# Patient Record
Sex: MALE | Race: WHITE | NOT HISPANIC OR LATINO | Employment: FULL TIME | URBAN - NONMETROPOLITAN AREA
[De-identification: names, ages, dates, MRNs, and addresses within clinical notes are randomized per-mention and may not be internally consistent; named-entity substitution may affect disease eponyms.]

---

## 2017-01-11 ENCOUNTER — HISTORY (OUTPATIENT)
Dept: FAMILY MEDICINE | Facility: OTHER | Age: 42
End: 2017-01-11

## 2017-01-11 ENCOUNTER — OFFICE VISIT - GICH (OUTPATIENT)
Dept: FAMILY MEDICINE | Facility: OTHER | Age: 42
End: 2017-01-11

## 2017-01-11 DIAGNOSIS — F43.22 ADJUSTMENT DISORDER WITH ANXIETY: ICD-10-CM

## 2017-01-11 ASSESSMENT — ANXIETY QUESTIONNAIRES
6. BECOMING EASILY ANNOYED OR IRRITABLE: SEVERAL DAYS
1. FEELING NERVOUS, ANXIOUS, OR ON EDGE: SEVERAL DAYS
3. WORRYING TOO MUCH ABOUT DIFFERENT THINGS: SEVERAL DAYS
2. NOT BEING ABLE TO STOP OR CONTROL WORRYING: SEVERAL DAYS
4. TROUBLE RELAXING: NOT AT ALL
GAD7 TOTAL SCORE: 5
5. BEING SO RESTLESS THAT IT IS HARD TO SIT STILL: SEVERAL DAYS
7. FEELING AFRAID AS IF SOMETHING AWFUL MIGHT HAPPEN: NOT AT ALL

## 2017-01-11 ASSESSMENT — PATIENT HEALTH QUESTIONNAIRE - PHQ9: SUM OF ALL RESPONSES TO PHQ QUESTIONS 1-9: 2

## 2017-01-13 ENCOUNTER — COMMUNICATION - GICH (OUTPATIENT)
Dept: FAMILY MEDICINE | Facility: OTHER | Age: 42
End: 2017-01-13

## 2017-01-13 DIAGNOSIS — F43.22 ADJUSTMENT DISORDER WITH ANXIETY: ICD-10-CM

## 2017-02-17 ENCOUNTER — OFFICE VISIT - GICH (OUTPATIENT)
Dept: FAMILY MEDICINE | Facility: OTHER | Age: 42
End: 2017-02-17

## 2017-02-17 ENCOUNTER — HISTORY (OUTPATIENT)
Dept: FAMILY MEDICINE | Facility: OTHER | Age: 42
End: 2017-02-17

## 2017-02-17 DIAGNOSIS — F43.22 ADJUSTMENT DISORDER WITH ANXIETY: ICD-10-CM

## 2017-02-17 ASSESSMENT — PATIENT HEALTH QUESTIONNAIRE - PHQ9: SUM OF ALL RESPONSES TO PHQ QUESTIONS 1-9: 4

## 2017-04-27 ENCOUNTER — HISTORY (OUTPATIENT)
Dept: FAMILY MEDICINE | Facility: OTHER | Age: 42
End: 2017-04-27

## 2017-04-27 ENCOUNTER — OFFICE VISIT - GICH (OUTPATIENT)
Dept: FAMILY MEDICINE | Facility: OTHER | Age: 42
End: 2017-04-27

## 2017-04-27 DIAGNOSIS — R29.898 OTHER SYMPTOMS AND SIGNS INVOLVING THE MUSCULOSKELETAL SYSTEM: ICD-10-CM

## 2017-04-27 DIAGNOSIS — Z30.8 ENCOUNTER FOR OTHER CONTRACEPTIVE MANAGEMENT: ICD-10-CM

## 2017-04-28 ENCOUNTER — AMBULATORY - GICH (OUTPATIENT)
Dept: RADIOLOGY | Facility: OTHER | Age: 42
End: 2017-04-28

## 2017-04-28 DIAGNOSIS — T14.8XXA OTHER INJURY OF UNSPECIFIED BODY REGION: ICD-10-CM

## 2017-05-01 ENCOUNTER — HOSPITAL ENCOUNTER (OUTPATIENT)
Dept: RADIOLOGY | Facility: OTHER | Age: 42
End: 2017-05-01

## 2017-05-01 DIAGNOSIS — T14.8XXA OTHER INJURY OF UNSPECIFIED BODY REGION: ICD-10-CM

## 2017-05-16 ENCOUNTER — AMBULATORY - GICH (OUTPATIENT)
Dept: PHYSICAL THERAPY | Facility: OTHER | Age: 42
End: 2017-05-16

## 2017-05-16 DIAGNOSIS — Z98.890 OTHER SPECIFIED POSTPROCEDURAL STATES: ICD-10-CM

## 2017-06-06 ENCOUNTER — HOSPITAL ENCOUNTER (OUTPATIENT)
Dept: PHYSICAL THERAPY | Facility: OTHER | Age: 42
Setting detail: THERAPIES SERIES
End: 2017-06-06

## 2017-06-06 DIAGNOSIS — Z98.890 OTHER SPECIFIED POSTPROCEDURAL STATES: ICD-10-CM

## 2017-06-12 ENCOUNTER — HOSPITAL ENCOUNTER (OUTPATIENT)
Dept: PHYSICAL THERAPY | Facility: OTHER | Age: 42
Setting detail: THERAPIES SERIES
End: 2017-06-12

## 2017-06-19 ENCOUNTER — HOSPITAL ENCOUNTER (OUTPATIENT)
Dept: PHYSICAL THERAPY | Facility: OTHER | Age: 42
Setting detail: THERAPIES SERIES
End: 2017-06-19

## 2017-06-27 ENCOUNTER — HOSPITAL ENCOUNTER (OUTPATIENT)
Dept: PHYSICAL THERAPY | Facility: OTHER | Age: 42
Setting detail: THERAPIES SERIES
End: 2017-06-27

## 2017-06-29 ENCOUNTER — HOSPITAL ENCOUNTER (OUTPATIENT)
Dept: PHYSICAL THERAPY | Facility: OTHER | Age: 42
Setting detail: THERAPIES SERIES
End: 2017-06-29

## 2017-07-03 ENCOUNTER — HOSPITAL ENCOUNTER (OUTPATIENT)
Dept: PHYSICAL THERAPY | Facility: OTHER | Age: 42
Setting detail: THERAPIES SERIES
End: 2017-07-03

## 2017-07-05 ENCOUNTER — HOSPITAL ENCOUNTER (OUTPATIENT)
Dept: PHYSICAL THERAPY | Facility: OTHER | Age: 42
Setting detail: THERAPIES SERIES
End: 2017-07-05

## 2017-07-13 ENCOUNTER — HOSPITAL ENCOUNTER (OUTPATIENT)
Dept: PHYSICAL THERAPY | Facility: OTHER | Age: 42
Setting detail: THERAPIES SERIES
End: 2017-07-13

## 2017-07-25 ENCOUNTER — HOSPITAL ENCOUNTER (OUTPATIENT)
Dept: PHYSICAL THERAPY | Facility: OTHER | Age: 42
Setting detail: THERAPIES SERIES
End: 2017-07-25

## 2017-08-02 ENCOUNTER — HOSPITAL ENCOUNTER (OUTPATIENT)
Dept: PHYSICAL THERAPY | Facility: OTHER | Age: 42
Setting detail: THERAPIES SERIES
End: 2017-08-02

## 2017-08-18 ENCOUNTER — OFFICE VISIT - GICH (OUTPATIENT)
Dept: FAMILY MEDICINE | Facility: OTHER | Age: 42
End: 2017-08-18

## 2017-08-18 ENCOUNTER — HOSPITAL ENCOUNTER (OUTPATIENT)
Dept: RADIOLOGY | Facility: OTHER | Age: 42
End: 2017-08-18
Attending: FAMILY MEDICINE

## 2017-08-18 ENCOUNTER — HISTORY (OUTPATIENT)
Dept: FAMILY MEDICINE | Facility: OTHER | Age: 42
End: 2017-08-18

## 2017-08-18 DIAGNOSIS — S63.619A: ICD-10-CM

## 2017-09-25 ENCOUNTER — COMMUNICATION - GICH (OUTPATIENT)
Dept: FAMILY MEDICINE | Facility: OTHER | Age: 42
End: 2017-09-25

## 2017-09-25 DIAGNOSIS — K21.9 GASTRO-ESOPHAGEAL REFLUX DISEASE WITHOUT ESOPHAGITIS: ICD-10-CM

## 2017-10-23 ENCOUNTER — HISTORY (OUTPATIENT)
Dept: FAMILY MEDICINE | Facility: OTHER | Age: 42
End: 2017-10-23

## 2017-10-23 ENCOUNTER — OFFICE VISIT - GICH (OUTPATIENT)
Dept: FAMILY MEDICINE | Facility: OTHER | Age: 42
End: 2017-10-23

## 2017-10-23 DIAGNOSIS — K21.9 GASTRO-ESOPHAGEAL REFLUX DISEASE WITHOUT ESOPHAGITIS: ICD-10-CM

## 2017-10-23 DIAGNOSIS — R19.8 OTHER SPECIFIED SYMPTOMS AND SIGNS INVOLVING THE DIGESTIVE SYSTEM AND ABDOMEN: ICD-10-CM

## 2017-11-01 ENCOUNTER — OFFICE VISIT - GICH (OUTPATIENT)
Dept: FAMILY MEDICINE | Facility: OTHER | Age: 42
End: 2017-11-01

## 2017-11-01 ENCOUNTER — HISTORY (OUTPATIENT)
Dept: FAMILY MEDICINE | Facility: OTHER | Age: 42
End: 2017-11-01

## 2017-11-01 DIAGNOSIS — Z00.00 ENCOUNTER FOR GENERAL ADULT MEDICAL EXAMINATION WITHOUT ABNORMAL FINDINGS: ICD-10-CM

## 2017-11-01 DIAGNOSIS — R10.13 EPIGASTRIC PAIN: ICD-10-CM

## 2017-11-01 DIAGNOSIS — K21.9 GASTRO-ESOPHAGEAL REFLUX DISEASE WITHOUT ESOPHAGITIS: ICD-10-CM

## 2017-11-01 LAB
A/G RATIO - HISTORICAL: 1.6 (ref 1–2)
ABSOLUTE BASOPHILS - HISTORICAL: 0 THOU/CU MM
ABSOLUTE EOSINOPHILS - HISTORICAL: 0.1 THOU/CU MM
ABSOLUTE IMMATURE GRANULOCYTES(METAS,MYELOS,PROS) - HISTORICAL: 0 THOU/CU MM
ABSOLUTE LYMPHOCYTES - HISTORICAL: 1.1 THOU/CU MM (ref 0.9–2.9)
ABSOLUTE MONOCYTES - HISTORICAL: 0.4 THOU/CU MM
ABSOLUTE NEUTROPHILS - HISTORICAL: 3.1 THOU/CU MM (ref 1.7–7)
ALBUMIN SERPL-MCNC: 4.3 G/DL (ref 3.5–5.7)
ALP SERPL-CCNC: 47 IU/L (ref 34–104)
ALT (SGPT) - HISTORICAL: 19 IU/L (ref 7–52)
ANION GAP - HISTORICAL: 8 (ref 5–18)
AST SERPL-CCNC: 18 IU/L (ref 13–39)
BASOPHILS # BLD AUTO: 0.4 %
BILIRUB SERPL-MCNC: 0.7 MG/DL (ref 0.3–1)
BUN SERPL-MCNC: 17 MG/DL (ref 7–25)
BUN/CREAT RATIO - HISTORICAL: 17
CALCIUM SERPL-MCNC: 9.5 MG/DL (ref 8.6–10.3)
CHLORIDE SERPLBLD-SCNC: 105 MMOL/L (ref 98–107)
CO2 SERPL-SCNC: 27 MMOL/L (ref 21–31)
CREAT SERPL-MCNC: 1.03 MG/DL (ref 0.7–1.3)
EOSINOPHIL NFR BLD AUTO: 1.1 %
ERYTHROCYTE [DISTWIDTH] IN BLOOD BY AUTOMATED COUNT: 12.3 % (ref 11.5–15.5)
GFR IF NOT AFRICAN AMERICAN - HISTORICAL: >60 ML/MIN/1.73M2
GLOBULIN - HISTORICAL: 2.7 G/DL (ref 2–3.7)
GLUCOSE SERPL-MCNC: 101 MG/DL (ref 70–105)
HCT VFR BLD AUTO: 48 % (ref 37–53)
HEMOGLOBIN: 16.5 G/DL (ref 13.5–17.5)
HIV-1/HIV-2 ANTIBODY CATEGORY - HISTORICAL: NORMAL
IMMATURE GRANULOCYTES(METAS,MYELOS,PROS) - HISTORICAL: 0.2 %
LYMPHOCYTES NFR BLD AUTO: 23.6 % (ref 20–44)
MCH RBC QN AUTO: 31.9 PG (ref 26–34)
MCHC RBC AUTO-ENTMCNC: 34.4 G/DL (ref 32–36)
MCV RBC AUTO: 93 FL (ref 80–100)
MONOCYTES NFR BLD AUTO: 7.6 %
NEUTROPHILS NFR BLD AUTO: 67.1 % (ref 42–72)
PLATELET # BLD AUTO: 143 THOU/CU MM (ref 140–440)
PMV BLD: 11.5 FL (ref 6.5–11)
POTASSIUM SERPL-SCNC: 4.4 MMOL/L (ref 3.5–5.1)
PROT SERPL-MCNC: 7 G/DL (ref 6.4–8.9)
RED BLOOD COUNT - HISTORICAL: 5.18 MIL/CU MM (ref 4.3–5.9)
SODIUM SERPL-SCNC: 140 MMOL/L (ref 133–143)
WHITE BLOOD COUNT - HISTORICAL: 4.6 THOU/CU MM (ref 4.5–11)

## 2017-11-01 ASSESSMENT — ANXIETY QUESTIONNAIRES
6. BECOMING EASILY ANNOYED OR IRRITABLE: NOT AT ALL
7. FEELING AFRAID AS IF SOMETHING AWFUL MIGHT HAPPEN: NOT AT ALL
3. WORRYING TOO MUCH ABOUT DIFFERENT THINGS: SEVERAL DAYS
4. TROUBLE RELAXING: NOT AT ALL
5. BEING SO RESTLESS THAT IT IS HARD TO SIT STILL: NOT AT ALL
2. NOT BEING ABLE TO STOP OR CONTROL WORRYING: SEVERAL DAYS
1. FEELING NERVOUS, ANXIOUS, OR ON EDGE: SEVERAL DAYS
GAD7 TOTAL SCORE: 3

## 2017-11-02 ENCOUNTER — COMMUNICATION - GICH (OUTPATIENT)
Dept: SURGERY | Facility: OTHER | Age: 42
End: 2017-11-02

## 2017-11-03 ENCOUNTER — AMBULATORY - GICH (OUTPATIENT)
Dept: FAMILY MEDICINE | Facility: OTHER | Age: 42
End: 2017-11-03

## 2017-11-03 ENCOUNTER — AMBULATORY - GICH (OUTPATIENT)
Dept: LAB | Facility: OTHER | Age: 42
End: 2017-11-03

## 2017-11-03 ENCOUNTER — COMMUNICATION - GICH (OUTPATIENT)
Dept: FAMILY MEDICINE | Facility: OTHER | Age: 42
End: 2017-11-03

## 2017-11-03 DIAGNOSIS — R30.0 DYSURIA: ICD-10-CM

## 2017-11-03 LAB
BACTERIA URINE: NORMAL BACTERIA/HPF
BILIRUB UR QL: NEGATIVE
CLARITY, URINE: CLEAR CLARITY
COLOR UR: YELLOW COLOR
EPITHELIAL CELLS: NORMAL EPI/HPF
GLUCOSE URINE: NEGATIVE MG/DL
KETONES UR QL: NEGATIVE MG/DL
LEUKOCYTE ESTERASE URINE: ABNORMAL
NITRITE UR QL STRIP: NEGATIVE
OCCULT BLOOD,URINE - HISTORICAL: NEGATIVE
PH UR: 6 [PH]
PROTEIN QUALITATIVE,URINE - HISTORICAL: NEGATIVE MG/DL
RBC - HISTORICAL: NORMAL /HPF
SP GR UR STRIP: 1.01
UROBILINOGEN,QUALITATIVE - HISTORICAL: NORMAL EU/DL
WBC - HISTORICAL: NORMAL /HPF

## 2017-11-06 ENCOUNTER — COMMUNICATION - GICH (OUTPATIENT)
Dept: FAMILY MEDICINE | Facility: OTHER | Age: 42
End: 2017-11-06

## 2017-11-06 ENCOUNTER — OFFICE VISIT - GICH (OUTPATIENT)
Dept: FAMILY MEDICINE | Facility: OTHER | Age: 42
End: 2017-11-06

## 2017-11-06 ENCOUNTER — HISTORY (OUTPATIENT)
Dept: FAMILY MEDICINE | Facility: OTHER | Age: 42
End: 2017-11-06

## 2017-11-06 DIAGNOSIS — R36.9 URETHRAL DISCHARGE: ICD-10-CM

## 2017-11-06 DIAGNOSIS — Z11.3 ENCOUNTER FOR SCREENING FOR INFECTIONS WITH PREDOMINANTLY SEXUAL MODE OF TRANSMISSION: ICD-10-CM

## 2017-11-07 ENCOUNTER — HOSPITAL ENCOUNTER (OUTPATIENT)
Dept: RADIOLOGY | Facility: OTHER | Age: 42
End: 2017-11-07
Attending: FAMILY MEDICINE

## 2017-11-07 ENCOUNTER — OFFICE VISIT - GICH (OUTPATIENT)
Dept: FAMILY MEDICINE | Facility: OTHER | Age: 42
End: 2017-11-07

## 2017-11-07 ENCOUNTER — HISTORY (OUTPATIENT)
Dept: FAMILY MEDICINE | Facility: OTHER | Age: 42
End: 2017-11-07

## 2017-11-07 DIAGNOSIS — R10.9 ABDOMINAL PAIN: ICD-10-CM

## 2017-11-07 LAB
BILIRUB UR QL: NEGATIVE
CLARITY, URINE: CLEAR CLARITY
COLOR UR: YELLOW COLOR
GLUCOSE URINE: NEGATIVE MG/DL
KETONES UR QL: NEGATIVE MG/DL
LEUKOCYTE ESTERASE URINE: NEGATIVE
NITRITE UR QL STRIP: NEGATIVE
OCCULT BLOOD,URINE - HISTORICAL: NEGATIVE
PH UR: 6.5 [PH]
PROTEIN QUALITATIVE,URINE - HISTORICAL: NEGATIVE MG/DL
SP GR UR STRIP: <=1.005
UROBILINOGEN,QUALITATIVE - HISTORICAL: NORMAL EU/DL

## 2017-11-09 LAB — CULTURE - HISTORICAL: NORMAL

## 2017-12-05 ENCOUNTER — HISTORY (OUTPATIENT)
Dept: FAMILY MEDICINE | Facility: OTHER | Age: 42
End: 2017-12-05

## 2017-12-05 ENCOUNTER — OFFICE VISIT - GICH (OUTPATIENT)
Dept: FAMILY MEDICINE | Facility: OTHER | Age: 42
End: 2017-12-05

## 2017-12-05 DIAGNOSIS — M54.6 PAIN IN THORACIC SPINE: ICD-10-CM

## 2017-12-05 DIAGNOSIS — F41.9 ANXIETY DISORDER: ICD-10-CM

## 2017-12-05 DIAGNOSIS — N34.1 NONSPECIFIC URETHRITIS: ICD-10-CM

## 2017-12-05 DIAGNOSIS — N41.9 INFLAMMATORY DISEASE OF PROSTATE: ICD-10-CM

## 2017-12-05 LAB
BACTERIA URINE: NORMAL BACTERIA/HPF
BILIRUB UR QL: NEGATIVE
CLARITY, URINE: CLEAR CLARITY
COLOR UR: YELLOW COLOR
EPITHELIAL CELLS: NORMAL EPI/HPF
GLUCOSE URINE: NEGATIVE MG/DL
KETONES UR QL: NEGATIVE MG/DL
LEUKOCYTE ESTERASE URINE: ABNORMAL
NITRITE UR QL STRIP: NEGATIVE
OCCULT BLOOD,URINE - HISTORICAL: NEGATIVE
PH UR: 6.5 [PH]
PROTEIN QUALITATIVE,URINE - HISTORICAL: NEGATIVE MG/DL
RBC - HISTORICAL: NORMAL /HPF
SP GR UR STRIP: <=1.005
UROBILINOGEN,QUALITATIVE - HISTORICAL: NORMAL EU/DL
WBC - HISTORICAL: NORMAL /HPF

## 2017-12-06 ENCOUNTER — HISTORY (OUTPATIENT)
Dept: SURGERY | Facility: OTHER | Age: 42
End: 2017-12-06

## 2017-12-07 ENCOUNTER — SURGERY (OUTPATIENT)
Dept: SURGERY | Facility: OTHER | Age: 42
End: 2017-12-07

## 2017-12-07 ENCOUNTER — HOSPITAL ENCOUNTER (OUTPATIENT)
Dept: SURGERY | Facility: OTHER | Age: 42
Discharge: HOME | End: 2017-12-07
Attending: SURGERY | Admitting: SURGERY

## 2017-12-07 ENCOUNTER — HISTORY (OUTPATIENT)
Dept: SURGERY | Facility: OTHER | Age: 42
End: 2017-12-07

## 2017-12-08 ENCOUNTER — AMBULATORY - GICH (OUTPATIENT)
Dept: SURGERY | Facility: OTHER | Age: 42
End: 2017-12-08

## 2017-12-08 DIAGNOSIS — K31.89 OTHER DISEASES OF STOMACH AND DUODENUM: ICD-10-CM

## 2017-12-11 ENCOUNTER — COMMUNICATION - GICH (OUTPATIENT)
Dept: SURGERY | Facility: OTHER | Age: 42
End: 2017-12-11

## 2017-12-22 ENCOUNTER — OFFICE VISIT - GICH (OUTPATIENT)
Dept: FAMILY MEDICINE | Facility: OTHER | Age: 42
End: 2017-12-22

## 2017-12-22 ENCOUNTER — HISTORY (OUTPATIENT)
Dept: FAMILY MEDICINE | Facility: OTHER | Age: 42
End: 2017-12-22

## 2017-12-22 ENCOUNTER — COMMUNICATION - GICH (OUTPATIENT)
Dept: FAMILY MEDICINE | Facility: OTHER | Age: 42
End: 2017-12-22

## 2017-12-22 DIAGNOSIS — R39.89 OTHER SYMPTOMS AND SIGNS INVOLVING THE GENITOURINARY SYSTEM: ICD-10-CM

## 2017-12-22 DIAGNOSIS — R30.0 DYSURIA: ICD-10-CM

## 2017-12-22 DIAGNOSIS — N34.1 NONSPECIFIC URETHRITIS: ICD-10-CM

## 2017-12-22 LAB
BILIRUB UR QL: NEGATIVE
CLARITY, URINE: CLEAR CLARITY
COLOR UR: YELLOW COLOR
GLUCOSE URINE: NEGATIVE MG/DL
KETONES UR QL: ABNORMAL MG/DL
LEUKOCYTE ESTERASE URINE: NEGATIVE
NITRITE UR QL STRIP: NEGATIVE
OCCULT BLOOD,URINE - HISTORICAL: NEGATIVE
PH UR: 7 [PH]
PROTEIN QUALITATIVE,URINE - HISTORICAL: NEGATIVE MG/DL
SP GR UR STRIP: 1.01
UROBILINOGEN,QUALITATIVE - HISTORICAL: NORMAL EU/DL

## 2017-12-26 ENCOUNTER — COMMUNICATION - GICH (OUTPATIENT)
Dept: FAMILY MEDICINE | Facility: OTHER | Age: 42
End: 2017-12-26

## 2017-12-26 DIAGNOSIS — K21.9 GASTRO-ESOPHAGEAL REFLUX DISEASE WITHOUT ESOPHAGITIS: ICD-10-CM

## 2017-12-27 NOTE — PROGRESS NOTES
"Patient Information     Patient Name MRN Meliton Gurrola [id number] Male 1975      Progress Notes by Mecca Martinez MD at 10/23/2017  2:15 PM     Author:  Mecca Martinez MD Service:  (none) Author Type:  Physician     Filed:  10/23/2017  2:34 PM Encounter Date:  10/23/2017 Status:  Signed     :  Mecca Martinez MD (Physician)            SUBJECTIVE:    Meliton Finnegan is a 42 y.o. male who presents for issues with GI distress and has been having issues for about a month with intermittent diarrhea, bowel gas, noisy and feels bloated. He does like mild and consumes quite a bit of it. No recent antibiotic use. Both of his daughters have lactose intolerance.Describes it as a \"fire going on in there\". The pain does not radiate to his back and is not well localized although he thinks it's more epigastric and lower abdomen. No change in color of stool, melena or concerns for blood. He really hasn't tried lactose-free diet although he didn't have any for 2 days and then tried a yogurt this morning and had recurrence of some of the symptoms. Nothing that he describes indicates concern for gallbladder disease.  Uses prilosec and does help. Coffee consumption 1-6 cups a day. No sodas. Not a smoker.     HPI    No Known Allergies,   Current Outpatient Prescriptions:      omeprazole (PRILOSEC) 40 mg Delayed-Release capsule, Take 1 capsule by mouth once daily., Disp: 90 capsule, Rfl: 4  Medications have been reviewed by me and are current to the best of my knowledge and ability. and   Patient Active Problem List       Diagnosis  Date Noted     Adjustment disorder with anxious mood  2017     BPPV (benign paroxysmal positional vertigo)  11/15/2016     S/P shoulder surgery  10/26/2016     Bilateral        Gastroesophageal reflux disease without esophagitis  2015     DOT EXAM  2012     PALPITATIONS  2012     GROIN PAIN  2010     VENEREAL WART       HYPERCHOLESTEROLEMIA "       mild            REVIEW OF SYSTEMS:  ROS    OBJECTIVE:  /80  Pulse (!) 48  Wt 85.6 kg (188 lb 12.8 oz)  BMI 27.09 kg/m2  Wt Readings from Last 6 Encounters:    10/23/17 85.6 kg (188 lb 12.8 oz)   08/18/17 83.2 kg (183 lb 6.4 oz)   04/27/17 85.4 kg (188 lb 3.2 oz)   02/17/17 84 kg (185 lb 3.2 oz)   01/11/17 87 kg (191 lb 12.8 oz)   08/17/16 85.8 kg (189 lb 3.2 oz)        EXAM:   Physical Exam   Constitutional: He is well-developed, well-nourished, and in no distress. No distress.   Abdominal:   No pain reported at this time.   Nursing note and vitals reviewed.      ASSESSMENT/PLAN:    ICD-10-CM    1. Gastrointestinal complaints R19.8    2. Gastroesophageal reflux disease without esophagitis K21.9 omeprazole (PRILOSEC) 40 mg Delayed-Release capsule        Plan:    Trial of lactose-free diet for 3-4 weeks discussed and encouraged. If he still having symptoms then we need to investigate other causes for this and consider EGD.  He should return at his discretion and we'll see how it goes off of lactose-containing products although he is very reluctant to do this since he really likes milk and cheese.  Prilosec refilled for a year.  Discussed labs and he gets screening done at his place of employment yearly.  Mecca Martinez MD  2:34 PM 10/23/2017   Portions of this dictation were created using the Dragon Nuance voice recognition system. Proofreading was completed but there may be errors in text.

## 2017-12-27 NOTE — PROGRESS NOTES
"Patient Information     Patient Name MRN Sex Meliton Stone [id number] Male 1975      Progress Notes by Tolu Mccarty MD at 2017 10:50 AM     Author:  Tolu Mccarty MD Service:  (none) Author Type:  Physician     Filed:  2017  7:52 AM Encounter Date:  2017 Status:  Signed     :  Tolu Mccarty MD (Physician)              SUBJECTIVE:    Meliton Finnegan is a 42 y.o. male who presents for px    HPI: Has had about 4 weeks or mor of diarrhea.  Worse at times.  Will get diffuse constant abdomen pains.  Feels bloated.  No cramping.  Worse when he eats.  Tried a dairy free diet, mildly improved the symptoms.  Will have up to 3-4 stools daily, sometimes diarrhea shortly after he eats.  Eating healthier.  sometimes he gets right upper quadrant pains as well.  Comes and goes.  Exercises often, 3-4 times a week. No weight loss.  Overall is tired and fatigued.  No recent antibiotics.  Both daughters have lactose intolerance.  He has also researched irritable syndrome.  Emotionally, he has spells of anxiety, no depression.  Has a new girlfriend.  Wants to get tested for STDs for his peace of mind.      Has had hoarse voice lately while talking loudly at [employer] practice.  Wonders if he has more GERD problems.  Abdomen feels \"on fire\" and gets some juan antonio reflux at times.      Gets his labs yearly at work, does not want a lipid panel    Rare EtOH now.  Perhaps 4 total per week.    Past Surgical History:      Procedure  Laterality Date     HAND FINGER SURGERY      Tendon repair, right fifth finger, under anesthesia        OUTER EAR SURGERY      Left ear surgery for cartilage reconstruction        VASECTOMY       VASECTOMY      reversal scheduled for        WISDOM TEETH EXTRACTION      under anesthesia      Bilateral shoulder surgeries    PROBLEM LIST:  Patient Active Problem List     Diagnosis  Code     PALPITATIONS R00.2     GROIN PAIN R10.9     DOT EXAM Z02.89     VENEREAL " WART A63.0     HYPERCHOLESTEROLEMIA E78.00     Gastroesophageal reflux disease without esophagitis K21.9     BPPV (benign paroxysmal positional vertigo) H81.10     Adjustment disorder with anxious mood F43.22     S/P shoulder surgery Z98.890     PAST MEDICAL HISTORY:No past medical history on file.  SURGICAL HISTORY:  Past Surgical History:      Procedure  Laterality Date     HAND FINGER SURGERY      Tendon repair, right fifth finger, under anesthesia        OUTER EAR SURGERY      Left ear surgery for cartilage reconstruction        SHOULDER SURGERY Bilateral 2017     VASECTOMY       VASECTOMY      reversal scheduled for 12/06       WISDOM TEETH EXTRACTION      under anesthesia          SOCIAL HISTORY:  Social History     Social History        Marital status:       Spouse name: N/A     Number of children:  N/A     Years of education:  N/A     Occupational History      Not on file.     Social History Main Topics         Smoking status:  Never Smoker      Smokeless tobacco:  Current User      Types: Chew      Alcohol use  Yes     Drug use:  No      Sexual activity:  Not on file      Other Topics  Concern     Not on file      Social History Narrative     Was ; works for [employer] as a . 3/16.    Children 2 daughters, Brooks age 7 and Ashlie age 4, Son Junior         FAMILY HISTORY:  Family History       Problem   Relation Age of Onset     Good Health  Mother      Hyperlipidemia  Father      high cholesterol       Good Health  Daughter      Good Health  Son      Good Health  Other       CURRENT MEDICATIONS:   Current Outpatient Prescriptions       Medication  Sig Dispense Refill     omeprazole (PRILOSEC) 40 mg Delayed-Release capsule Take 1 capsule by mouth once daily. 90 capsule 4     No current facility-administered medications for this visit.      Medications have been reviewed by me and are current to the best of my knowledge and ability.    ALLERGIES:  Review of patient's  "allergies indicates no known allergies.    REVIEW OF SYSTEMS:  General: denies any general problems.  Eyes: denies problems  Ears/Nose/Throat: denies problems  Cardiovascular: denies problems  Respiratory: denies problems  Gastrointestinal: see HPI  Genitourinary: denies problems  Skin: denies problems  Neurologic: denies problems  Psychiatric: see HPI  Endocrine: denies problems  Heme/Lymphatic: denies problems  Allergic/Immunologic: denies problems  PHQ Depression Screening 11/1/2017   Date of PHQ exam (doc flow) 11/1/2017   1. Lack of interest/pleasure 0 - Not at all   2. Feeling down/depressed 0 - Not at all   PHQ-2 TOTAL SCORE 0   3. Trouble sleeping -   4. Decreased energy -   5. Appetite change -   6. Feelings of failure -   7. Trouble concentrating -   8. Activity level -   9. Hurting yourself -   PHQ-9 TOTAL SCORE -   PHQ-9 Severity Level -   Functional Impairment -   Some recent data might be hidden       OBJECTIVE:  /64  Resp 16  Ht 1.778 m (5' 10\")  Wt 86 kg (189 lb 9.6 oz)  BMI 27.2 kg/m2  EXAM:   General Appearance: Pleasant, alert, appropriate appearance for age. No acute distress  Head Exam: Normal. Normocephalic, atraumatic.  Eye Exam:  Normal external eye, conjunctiva, lids, cornea. MASSIMO.  Ear Exam: Normal TM's bilaterally. Normal auditory canals and external ears. Non-tender.  Nose Exam: Normal external nose, mucus membranes, and septum.  OroPharynx Exam:  Dental hygiene adequate. Normal buccal mucosa. Normal pharynx.  Neck Exam:  Supple, no masses or nodes.  Thyroid Exam: No nodules or enlargement.  Chest/Respiratory Exam: Normal chest wall and respirations. Clear to auscultation.  Cardiovascular Exam: Regular rate and rhythm. S1, S2, no murmur, click, gallop, or rubs.  Gastrointestinal Exam: Soft, non-tender, no masses or organomegaly.  Lymphatic Exam: Non-palpable nodes in neck, clavicular, axillary, or inguinal regions.  Musculoskeletal Exam: Back is straight and non-tender, full ROM " of upper and lower extremities.  Foot Exam: Left and right foot: good pedal pulses, no lesions, nail hygiene good.  Skin: no rash or abnormalities  Neurologic Exam: Nonfocal, symmetric DTRs, normal gross motor, tone coordination and no tremor.  Psychiatric Exam: Alert and oriented - appropriate affect.    ASSESSMENT/PLAN:    ICD-10-CM    1. Routine medical exam Z00.00 HIV 1 & 2      GC CHLAMYDIA TRACH PROBE   2. Gastroesophageal reflux disease without esophagitis K21.9    3. Dyspepsia R10.13 GASTROSCOPY/EGD - [facility]      COMPLETE METABOLIC PANEL      CBC WITH DIFFERENTIAL     BP Readings from Last 1 Encounters:11/01/17 : 122/64  Mr. Finnegan's blood pressure is out of the normal range for adults. Per JNC-8 guidelines normal adult blood pressure is < 120/80, pre-hypertensive is between 120/80 and 139/89, and hypertension is 140/90 or greater. Risks of hypertension were discussed. Patient's strategy will be to recheck blood pressure in 12 months    I advise minimal EtOH and stop chewing tobacco regarding the abdomen pain.  He is right in that this could all be irritable bowel syndrome.  Discussed with him medications for anxiety, for now he does not want this.   Since he has been on PROTON PUMP INHIBITOR medications for 1 year now, needs an EGD.  Follow up in 2-3 weeks if the above changes do not alter the symptoms.     Tolu Mccarty MD ....................  11/1/2017   11:15 AM

## 2017-12-27 NOTE — PROGRESS NOTES
Patient Information     Patient Name MRN Sex Meliton Stone [id number] Male 1975      Progress Notes by Latisha Alcantar PT at 2017  8:43 AM     Author:  Latisha Alcantar PT Service:  (none) Author Type:  PT- Physical Therapist     Filed:  2017  9:18 AM Date of Service:  2017  8:43 AM Status:  Signed     :  Latisha Alcantar PT (PT- Physical Therapist)                This note was printed and given to the patient for his follow up visit with surgeon next week.        [facility]  Outpatient PT - Daily Note    Date of Service: 2017   Visit #3/12  Insurance Auth: Eval, 12 visits    PSFS Visit 3/10  PSFS Completed: 17    Patient Name: Meliton Finnegan   YOB: 1975   Referring MD/Provider: Dr. Kitchen  Medical and Treatment Diagnosis: S/P shoulder surgery [Z98.890]   PT Treatment Diagnosis: decreased UE mobility  Insurance: Workman's Compensation  Start of Service: 17  Certification Dates: Start of Service: 17  Medicare/MA Re-Cert Due: 17       Date of Surgery:  17  Details:  Left open subscapularis and SSP repair     Physician Notes:  DORIS AND TREAT, DOS 17,  1-3 X/WEEK FOR 12 WEEKS, PLEASE ISSUE PULLEY FOR HOME USE AS INDICATED.  BEGIN THERAPY 4 WEEKS FROM DATE OF SURGERY.  PHASE 2A X 3WEEKS, PHASE 2B X 3 WEEKS, PHASE 3X 6 WEEKS  S/P LEFT SHOULDER OPEN SUBSCAPULARIS AND SSP REPAIR          Subjective      Rates pain: Left shoulder -3/10.  Reports compliance with HEP 2x/day.        Next follow up with surgeon:  17 (Tuesday)        Objective    Today's Intervention:      RUE ROM: FF = 153 ABD = 170 ER = 104 IR = 33    LUE ROM:  DATE           Flexion           Abduction           External Rotation 43 @30 74 @70          Internal Rotation 35@30 25 @70               - PROM for Left shoulder into FF, other motions with minimal overpressure.  Patient supine with small  bolster under knees.    - Cold with compression using Game Ready system, left shoulder sleeve, 34 degrees, medium compression, 15 min.  Patient in seated position with pillow supporting LUE on armrest of chair.      Next Visit:  Progress per protocol.      Home Exercise Program:    6-6-17:  Tabletop FF, scap retraction, pendulum  6-12-17:  4-corners stretches (did not add ER today)        Assessment    Therapist Assessment / Clinical Impression:  Doing well with ROM.  Scheduled to start Phase 2B next week (6-27-17).        Goals:  Patient goal:  Improve LUE function in 8 weeks.    Functional Short Term Goals:  (2 weeks):   - Patient will demonstrate correct performance of HEP with minimal to no cues required for full ROM and strength of elbow, forearm, wrist, and hand of RUE.  Goal met 6-12-17  3 weeks:   - Passive ROM to 90 degrees flexion for progression toward return of full ROM and ability to reach. Goal met 6-12-17  - Patient will report compliance with initial HEP for ongoing forearm and hand ROM/Strength. Goal met 6-12-17  6 weeks:   - Patient will demonstrate full RUE FF, ABD, ER, and IR for ability to progress toward return of functional use of RUE.   - Patient will tolerate weaning from sling without increased pain indicating adequate healing and muscle control/support.   9 weeks:   - Patient will demonstrate full Active ROM of RUE for ability to perform self cares and reaching with minimal to no limitation.   - Patient will report elimination of sleep disruption.   12 weeks:   - Patient will demonstrate 5/5 strength testing for increased ability to perform lifting and carrying tasks.   - Patient is to self-manage symptoms including continuation of HEP.       Completed 6-6-17:  Patient Specific Functional and Pain Scales (PSFS)  Clinician Instructions: Complete after the history and before the exam.   Initial Assessment:   We want to know what 3 activities in your life you are unable to perform, or are  having the most difficulty performing, as a result of your chief problem. Please list and score at least 3 activities that you are unable to perform, or having the most difficulty performing, because of your chief problem.   Patient Specific Activity Scoring Scheme (score one number for each activity):   Activity Score (0-10)  0= Unable to perform activity  10= Able to perform activity at same level as before injury or problem   1. Reaching overhead 0/10   2. lifting 0/10   3. Work related tasks 0/10   Totals:  0/10   Patient verbally states that they understand that the information they have provided above is current and complete to the best of their knowledge.   Patient Specific Functional Scale Modifier Scale Conversion: (patient's modifier that correlates with pt's score on PSFS): 0-CN (100% Impaired).  Initial Primary G Code and Modifier:    Per the Patient's intake and/or assessment the Primary G Code is: Handling Objects .   The Patient's Impairment, Limitation or Restriction Modifier would be best described as: CN - 100% Impairment.   Goal Primary G Code and Modifier:    The Patient's G Code Goal would be: Handling Objects    The Patient's Impairment, Limitation or Restriction Modifier goal would be best described as: CI - 1% - 20% Impairment.        Response to Intervention:  Good tolerance to treatment     Plan    Treatment Plan:      Frequency:   12 visits    Duration of Treatment: 12 weeks    Planned Interventions:    Home Exercise Program development  Therapeutic Exercise (ROM & Strengthening)  Therapeutic Activities  Neuromuscular Re-education  Manual Therapy  Ultrasound  E-Stim  Hot Packs / Cold Pack Modalities  Vasopneumatic Compression with Cold Therapy ( Game Ready )  Phonophoresis with Ketoprofen  Iontophoresis with Dexamethasone    Plan for next visit:  See above
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
## 2017-12-27 NOTE — PROGRESS NOTES
Patient Information     Patient Name MRN Sex Meliton Stone [id number] Male 1975      Progress Notes by Latisha Alcantar, PT at 2017  7:59 AM     Author:  Latisha Alcantar, PT  Service:  (none) Author Type:  PT- Physical Therapist     Filed:  2017  5:07 PM  Date of Service:  2017  7:59 AM Status:  Addendum     :  Latisha Alcantar PT (PT- Physical Therapist)        Related Notes: Original Note by Latisha Alcantar PT (PT- Physical Therapist) filed at 2017 10:54 AM              [facility]  Outpatient PT - Progress Note    Date of Service: 2017   Visit #/  Insurance Auth: Eval, 12 visits    PSFS Visit 8/10  PSFS Completed: 17; 17    Patient Name: Meliton Finnegan   YOB: 1975   Referring MD/Provider: Dr. Kitchen  Medical and Treatment Diagnosis: S/P shoulder surgery [Z98.890]   PT Treatment Diagnosis: decreased UE mobility  Insurance: Workman's Compensation  Start of Service: 17  Certification Dates: Start of Service: 17  Medicare/MA Re-Cert Due: 17       Date of Surgery:  17  Details:  Left open subscapularis and SSP repair     Physician Notes:  EVAL AND TREAT, DOS 17,  1-3 X/WEEK FOR 12 WEEKS, PLEASE ISSUE PULLEY FOR HOME USE AS INDICATED.  BEGIN THERAPY 4 WEEKS FROM DATE OF SURGERY.  PHASE 2A X 3WEEKS, PHASE 2B X 3 WEEKS, PHASE 3X 6 WEEKS  S/P LEFT SHOULDER OPEN SUBSCAPULARIS AND SSP REPAIR          Subjective      Rates pain: Left shoulder 2-3/10.  A little sore in the morning, but overall improvement noted.  With average daily tasks, rates himself about 40% of normal function.  Feels limited in both motion of the shoulder and strength.    Next follow up with surgeon:  8 weeks from 17 visit (end of August).        Objective    Today's Intervention:      RUE ROM: FF = 153 ABD = 170 ER = 104 IR = 33    LUE ROM:  DATE 6-6 6-19 6-27 7-5        Flexion  140 151         Abduction  143 155        External Rotation 43 @30 74 @70 91 @90 90        Internal Rotation 35@30 25 @70 34 @90 46             - UBE 3 min warm-up    - PROM for Left shoulder into FF, ABD, IR, ER.  Sup/Inf and Ant/Post glenohumeral joint mobilizations, grade III.  Patient supine with small bolster under knees.    TRX:    - push-up, 20 x 1  - row, 15 x 1    - standing FF, 0# 20 x 1  - abduction 0# 15 x 1  - sidelying ER 0# 30 x 1    - Forward bent Ext, 2# 30 x 1  - Forward bent Horiz Abd, 1# 30 x 1    - D1 Flexion, 5# 30 x 1  - D1 Extension, 4 kg 30 x1   - D2 Extension, 4 kg 30 x 1  - D1 Flexion, 0# 30 x 1 - Not completed today    MedX:  - Chest Press (seat 4): 80# 15 x 2  - Torso Arm (seat 4):  120# 15 x 1, 130# 15 x 1      - Patient will ice at home today.      Next Visit:  Progress per protocol.      Home Exercise Program:    6-6-17:  Tabletop FF, scap retraction, pendulum  6-12-17:  4-corners stretches (did not add ER today)  6-27-17:  ER stretch, standing RC stabilization progression  6-29-17: supine RC stabilization        Assessment    Therapist Assessment / Clinical Impression:  Progression of RC stabilization exercises.        Goals:  Patient goal:  Improve LUE function in 8 weeks.    Functional Short Term Goals:  (2 weeks):   - Patient will demonstrate correct performance of HEP with minimal to no cues required for full ROM and strength of elbow, forearm, wrist, and hand of RUE.  Goal met 6-12-17  3 weeks:   - Passive ROM to 90 degrees flexion for progression toward return of full ROM and ability to reach. Goal met 6-12-17  - Patient will report compliance with initial HEP for ongoing forearm and hand ROM/Strength. Goal met 6-12-17  6 weeks:   - Patient will demonstrate full RUE FF, ABD, ER, and IR for ability to progress toward return of functional use of RUE.   - Patient will tolerate weaning from sling without increased pain indicating adequate healing and muscle control/support.   9 weeks:    - Patient will demonstrate full Active ROM of RUE for ability to perform self cares and reaching with minimal to no limitation.   - Patient will report elimination of sleep disruption.   12 weeks:   - Patient will demonstrate 5/5 strength testing for increased ability to perform lifting and carrying tasks.   - Patient is to self-manage symptoms including continuation of HEP.       Completed 6-6-17:  Patient Specific Functional and Pain Scales (PSFS)  Clinician Instructions: Complete after the history and before the exam.   Initial Assessment:   We want to know what 3 activities in your life you are unable to perform, or are having the most difficulty performing, as a result of your chief problem. Please list and score at least 3 activities that you are unable to perform, or having the most difficulty performing, because of your chief problem.   Patient Specific Activity Scoring Scheme (score one number for each activity):   Activity Score (0-10)  0= Unable to perform activity  10= Able to perform activity at same level as before injury or problem   1. Reaching overhead 0/10   2. lifting 0/10   3. Work related tasks 0/10   Totals:  0/10   Patient verbally states that they understand that the information they have provided above is current and complete to the best of their knowledge.   Patient Specific Functional Scale Modifier Scale Conversion: (patient's modifier that correlates with pt's score on PSFS): 0-CN (100% Impaired).  Initial Primary G Code and Modifier:    Per the Patient's intake and/or assessment the Primary G Code is: Handling Objects .   The Patient's Impairment, Limitation or Restriction Modifier would be best described as: CN - 100% Impairment.   Goal Primary G Code and Modifier:    The Patient's G Code Goal would be: Handling Objects    The Patient's Impairment, Limitation or Restriction Modifier goal would be best described as: CI - 1% - 20% Impairment.       Completed 7-12-17:  Patient  Specific Functional and Pain Scales (PSFS)  Activity Score (0-10)  0= Unable to perform activity  10= Able to perform activity at same level as before injury or problem   1. Reaching overhead 6/10   2. lifting 4/10   3. Work related tasks 3/10   Totals:  4.3/10   Patient verbally states that they understand that the information they have provided above is current and complete to the best of their knowledge.   Patient Specific Functional Scale Modifier Scale Conversion: (patient's modifier that correlates with pt's score on PSFS): 4-CL (60% Impaired).  Current Primary G Code and Modifier:    Per the Patient's intake and/or assessment the Primary G Code is: Handling Objects .   The Patient's Impairment, Limitation or Restriction Modifier would be best described as: CK - 40% - 60% Impairment.   Goal Primary G Code and Modifier:    The Patient's G Code Goal would be: Handling Objects    The Patient's Impairment, Limitation or Restriction Modifier goal would be best described as: CI - 1% - 20% Impairment.        Response to Intervention:  Good tolerance to treatment     Plan    Treatment Plan:      Frequency:   12 visits    Duration of Treatment: 12 weeks    Planned Interventions:    Home Exercise Program development  Therapeutic Exercise (ROM & Strengthening)  Therapeutic Activities  Neuromuscular Re-education  Manual Therapy  Ultrasound  E-Stim  Hot Packs / Cold Pack Modalities  Vasopneumatic Compression with Cold Therapy ( Game Ready )  Phonophoresis with Ketoprofen  Iontophoresis with Dexamethasone    Plan for next visit:  See above

## 2017-12-27 NOTE — PROGRESS NOTES
Patient Information     Patient Name MRN Sex Meliton Stone [id number] Male 1975      Progress Notes by Latisha Alcantar, PT at 2017  8:45 AM     Author:  Latisha Alcantar, PT Service:  (none) Author Type:  PT- Physical Therapist     Filed:  2017  9:18 AM Date of Service:  2017  8:45 AM Status:  Signed     :  Latisha Alcantar PT (PT- Physical Therapist)              [facility]  Outpatient PT - Daily Note    Date of Service: 2017   Visit #  Insurance Auth: Eval, 12 visits    PSFS Visit 5/10  PSFS Completed: 17    Patient Name: Meliton Finnegan   YOB: 1975   Referring MD/Provider: Dr. Kitchen  Medical and Treatment Diagnosis: S/P shoulder surgery [Z98.890]   PT Treatment Diagnosis: decreased UE mobility  Insurance: Workman's Compensation  Start of Service: 17  Certification Dates: Start of Service: 17  Medicare/MA Re-Cert Due: 17       Date of Surgery:  17  Details:  Left open subscapularis and SSP repair     Physician Notes:  EVAL AND TREAT, DOS 17,  1-3 X/WEEK FOR 12 WEEKS, PLEASE ISSUE PULLEY FOR HOME USE AS INDICATED.  BEGIN THERAPY 4 WEEKS FROM DATE OF SURGERY.  PHASE 2A X 3WEEKS, PHASE 2B X 3 WEEKS, PHASE 3X 6 WEEKS  S/P LEFT SHOULDER OPEN SUBSCAPULARIS AND SSP REPAIR          Subjective      Rates pain: Left shoulder 3/10.  Was stiff and sore yesterday.     Next follow up with surgeon:  8 weeks from 17 visit (end ).        Objective    Today's Intervention:      RUE ROM: FF = 153 ABD = 170 ER = 104 IR = 33    LUE ROM:  DATE          Flexion  140         Abduction  143         External Rotation 43 @30 74 @70 91 @90         Internal Rotation 35@30 25 @70 34 @90              - UBE 3 min warm-up  - FF stretch, 20 sec    - PROM for Left shoulder into FF, other motions with minimal overpressure.  Patient supine with small bolster under  knees.    - supine FF, 0# 30 x 1  - supine horizontal ABD/ADD, 0# 30 x 1  - cw/ccw circles, 30 x 1 each  - chest press, 8# 30 x 1  - scapular pro/retraction, 2# 30 x 1  - sidelying ABD, 0# 30 x 1  * Added to HEP.  Patient was given a handout with picture and written instructions for exercise including guidelines for repetitions and frequency of performance.  Patient voiced understanding.      - Patient will ice at home.      Next Visit:  Progress per protocol.      Home Exercise Program:    6-6-17:  Tabletop FF, scap retraction, pendulum  6-12-17:  4-corners stretches (did not add ER today)  6-27-17:  ER stretch, standing RC stabilization progression  6-29-17: supine RC stabilization        Assessment    Therapist Assessment / Clinical Impression:  Decreased intensity of RC stabilization exercises.  Will resume standing exercises at a later date.        Goals:  Patient goal:  Improve LUE function in 8 weeks.    Functional Short Term Goals:  (2 weeks):   - Patient will demonstrate correct performance of HEP with minimal to no cues required for full ROM and strength of elbow, forearm, wrist, and hand of RUE.  Goal met 6-12-17  3 weeks:   - Passive ROM to 90 degrees flexion for progression toward return of full ROM and ability to reach. Goal met 6-12-17  - Patient will report compliance with initial HEP for ongoing forearm and hand ROM/Strength. Goal met 6-12-17  6 weeks:   - Patient will demonstrate full RUE FF, ABD, ER, and IR for ability to progress toward return of functional use of RUE.   - Patient will tolerate weaning from sling without increased pain indicating adequate healing and muscle control/support.   9 weeks:   - Patient will demonstrate full Active ROM of RUE for ability to perform self cares and reaching with minimal to no limitation.   - Patient will report elimination of sleep disruption.   12 weeks:   - Patient will demonstrate 5/5 strength testing for increased ability to perform lifting and  carrying tasks.   - Patient is to self-manage symptoms including continuation of HEP.       Completed 6-6-17:  Patient Specific Functional and Pain Scales (PSFS)  Clinician Instructions: Complete after the history and before the exam.   Initial Assessment:   We want to know what 3 activities in your life you are unable to perform, or are having the most difficulty performing, as a result of your chief problem. Please list and score at least 3 activities that you are unable to perform, or having the most difficulty performing, because of your chief problem.   Patient Specific Activity Scoring Scheme (score one number for each activity):   Activity Score (0-10)  0= Unable to perform activity  10= Able to perform activity at same level as before injury or problem   1. Reaching overhead 0/10   2. lifting 0/10   3. Work related tasks 0/10   Totals:  0/10   Patient verbally states that they understand that the information they have provided above is current and complete to the best of their knowledge.   Patient Specific Functional Scale Modifier Scale Conversion: (patient's modifier that correlates with pt's score on PSFS): 0-CN (100% Impaired).  Initial Primary G Code and Modifier:    Per the Patient's intake and/or assessment the Primary G Code is: Handling Objects .   The Patient's Impairment, Limitation or Restriction Modifier would be best described as: CN - 100% Impairment.   Goal Primary G Code and Modifier:    The Patient's G Code Goal would be: Handling Objects    The Patient's Impairment, Limitation or Restriction Modifier goal would be best described as: CI - 1% - 20% Impairment.        Response to Intervention:  Good tolerance to treatment     Plan    Treatment Plan:      Frequency:   12 visits    Duration of Treatment: 12 weeks    Planned Interventions:    Home Exercise Program development  Therapeutic Exercise (ROM & Strengthening)  Therapeutic Activities  Neuromuscular Re-education  Manual  Therapy  Ultrasound  E-Stim  Hot Packs / Cold Pack Modalities  Vasopneumatic Compression with Cold Therapy ( Game Ready )  Phonophoresis with Ketoprofen  Iontophoresis with Dexamethasone    Plan for next visit:  See above

## 2017-12-28 NOTE — PROGRESS NOTES
Patient Information     Patient Name MRN Sex Meliton Stone [id number] Male 1975      Progress Notes by Latisha Alcantra, PT at 2017  2:44 PM     Author:  Latisha Alcantar, PT Service:  (none) Author Type:  PT- Physical Therapist     Filed:  2017  3:34 PM Date of Service:  2017  2:44 PM Status:  Signed     :  Latisha Alcantar PT (PT- Physical Therapist)              [facility]  Outpatient PT - Daily Note    Date of Service: 2017   Visit #4  Insurance Auth: Eval, 12 visits    PSFS Visit 4/10  PSFS Completed: 17    Patient Name: Meliton Finnegan   YOB: 1975   Referring MD/Provider: Dr. Kitchen  Medical and Treatment Diagnosis: S/P shoulder surgery [Z98.890]   PT Treatment Diagnosis: decreased UE mobility  Insurance: Workman's Compensation  Start of Service: 17  Certification Dates: Start of Service: 17  Medicare/MA Re-Cert Due: 17       Date of Surgery:  17  Details:  Left open subscapularis and SSP repair     Physician Notes:  EVAL AND TREAT, DOS 17,  1-3 X/WEEK FOR 12 WEEKS, PLEASE ISSUE PULLEY FOR HOME USE AS INDICATED.  BEGIN THERAPY 4 WEEKS FROM DATE OF SURGERY.  PHASE 2A X 3WEEKS, PHASE 2B X 3 WEEKS, PHASE 3X 6 WEEKS  S/P LEFT SHOULDER OPEN SUBSCAPULARIS AND SSP REPAIR          Subjective      Rates pain: Left shoulder 2-3/10.  Saw Physician and was pleased with progress.  Recommended continuation of physical therapy.     Next follow up with surgeon:  17 (Tuesday)        Objective    Today's Intervention:      RUE ROM: FF = 153 ABD = 170 ER = 104 IR = 33    LUE ROM:  DATE          Flexion  140         Abduction  143         External Rotation 43 @30 74 @70 91 @90         Internal Rotation 35@30 25 @70 34 @90              - PROM for Left shoulder into FF, other motions with minimal overpressure.  Patient supine with small bolster under knees.    -  standing FF, 0# 20 x 1  - standing ABD, 0# 20 x 1  - standing IR, 2 kg 12 x 1, 1 kg 15 x 1 (green [employer] for home)  - sidelying ER, 0# 30 x 1    - wall push-up, 10 x 2  - forward bent EXT, 0# 12 x 1  - forward bent Horiz ABD, 0# 12 x 1    * Added above to HEP.  Patient was given a handout with picture and written instructions for exercise including guidelines for repetitions and frequency of performance.  Patient voiced understanding.      - Patient will ice at home.      Next Visit:  Progress per protocol.      Home Exercise Program:    6-6-17:  Tabletop FF, scap retraction, pendulum  6-12-17:  4-corners stretches (did not add ER today)  6-27-17:  ER stretch, standing RC stabilization progression        Assessment    Therapist Assessment / Clinical Impression:  Continued improvement in ROM.  Progression of AROM activity.        Goals:  Patient goal:  Improve LUE function in 8 weeks.    Functional Short Term Goals:  (2 weeks):   - Patient will demonstrate correct performance of HEP with minimal to no cues required for full ROM and strength of elbow, forearm, wrist, and hand of RUE.  Goal met 6-12-17  3 weeks:   - Passive ROM to 90 degrees flexion for progression toward return of full ROM and ability to reach. Goal met 6-12-17  - Patient will report compliance with initial HEP for ongoing forearm and hand ROM/Strength. Goal met 6-12-17  6 weeks:   - Patient will demonstrate full RUE FF, ABD, ER, and IR for ability to progress toward return of functional use of RUE.   - Patient will tolerate weaning from sling without increased pain indicating adequate healing and muscle control/support.   9 weeks:   - Patient will demonstrate full Active ROM of RUE for ability to perform self cares and reaching with minimal to no limitation.   - Patient will report elimination of sleep disruption.   12 weeks:   - Patient will demonstrate 5/5 strength testing for increased ability to perform lifting and carrying tasks.   - Patient is  to self-manage symptoms including continuation of HEP.       Completed 6-6-17:  Patient Specific Functional and Pain Scales (PSFS)  Clinician Instructions: Complete after the history and before the exam.   Initial Assessment:   We want to know what 3 activities in your life you are unable to perform, or are having the most difficulty performing, as a result of your chief problem. Please list and score at least 3 activities that you are unable to perform, or having the most difficulty performing, because of your chief problem.   Patient Specific Activity Scoring Scheme (score one number for each activity):   Activity Score (0-10)  0= Unable to perform activity  10= Able to perform activity at same level as before injury or problem   1. Reaching overhead 0/10   2. lifting 0/10   3. Work related tasks 0/10   Totals:  0/10   Patient verbally states that they understand that the information they have provided above is current and complete to the best of their knowledge.   Patient Specific Functional Scale Modifier Scale Conversion: (patient's modifier that correlates with pt's score on PSFS): 0-CN (100% Impaired).  Initial Primary G Code and Modifier:    Per the Patient's intake and/or assessment the Primary G Code is: Handling Objects .   The Patient's Impairment, Limitation or Restriction Modifier would be best described as: CN - 100% Impairment.   Goal Primary G Code and Modifier:    The Patient's G Code Goal would be: Handling Objects    The Patient's Impairment, Limitation or Restriction Modifier goal would be best described as: CI - 1% - 20% Impairment.        Response to Intervention:  Good tolerance to treatment     Plan    Treatment Plan:      Frequency:   12 visits    Duration of Treatment: 12 weeks    Planned Interventions:    Home Exercise Program development  Therapeutic Exercise (ROM & Strengthening)  Therapeutic Activities  Neuromuscular Re-education  Manual Therapy  Ultrasound  E-Stim  Hot Packs  / Cold Pack Modalities  Vasopneumatic Compression with Cold Therapy ( Game Ready )  Phonophoresis with Ketoprofen  Iontophoresis with Dexamethasone    Plan for next visit:  See above

## 2017-12-28 NOTE — TELEPHONE ENCOUNTER
Patient Information     Patient Name MRN Sex Meliton Stone [id number] Male 1975      Telephone Encounter by Alen Ariza MD at 2017  9:25 AM     Author:  Alen Ariza MD Service:  (none) Author Type:  Physician     Filed:  2017  9:25 AM Encounter Date:  2017 Status:  Signed     :  Alen Ariza MD (Physician)            Schedule EGD for dyspepsia

## 2017-12-28 NOTE — TELEPHONE ENCOUNTER
Patient Information     Patient Name MRN Meliton Gurrola [id number] Male 1975      Telephone Encounter by Kim Leroy at 11/3/2017 10:01 AM     Author:  Kim Leroy Service:  (none) Author Type:  (none)     Filed:  11/3/2017 10:01 AM Encounter Date:  11/3/2017 Status:  Signed     :  Kim Leroy            LOOKING FOR RESULTS OF THE U/A  Kim Leroy ....................  11/3/2017   10:01 AM

## 2017-12-28 NOTE — PROGRESS NOTES
Patient Information     Patient Name MRN Meliton Gurrola [id number] Male 1975      Progress Notes by Suki Finney at 2017  9:45 AM     Author:  Suki Finney Service:  (none) Author Type:  (none)     Filed:  11/10/2017  9:46 AM Encounter Date:  2017 Status:  Signed     :  Suki Finney            No answer.  Suki Finney CMA (AAMA)................ 11/10/2017 9:46 AM

## 2017-12-28 NOTE — PROGRESS NOTES
Patient Information     Patient Name MRN Sex Meliton Stone [id number] Male 1975      Progress Notes by Latisha Alcantar, PT at 7/3/2017  7:58 AM     Author:  Latisha Alcantar, PT Service:  (none) Author Type:  PT- Physical Therapist     Filed:  7/3/2017  8:46 AM Date of Service:  7/3/2017  7:58 AM Status:  Signed     :  Latisha Alcantar PT (PT- Physical Therapist)              [facility]  Outpatient PT - Daily Note    Date of Service: 7/3/2017   Visit #  Insurance Auth: Eval, 12 visits    PSFS Visit 6/10  PSFS Completed: 17    Patient Name: Meliton Finnegan   YOB: 1975   Referring MD/Provider: Dr. Kitchen  Medical and Treatment Diagnosis: S/P shoulder surgery [Z98.890]   PT Treatment Diagnosis: decreased UE mobility  Insurance: [employer]  Start of Service: 17  Certification Dates: Start of Service: 17  Medicare/MA Re-Cert Due: 17       Date of Surgery:  17  Details:  Left open subscapularis and SSP repair     Physician Notes:  EVAL AND TREAT, DOS 17,  1-3 X/WEEK FOR 12 WEEKS, PLEASE ISSUE PULLEY FOR HOME USE AS INDICATED.  BEGIN THERAPY 4 WEEKS FROM DATE OF SURGERY.  PHASE 2A X 3WEEKS, PHASE 2B X 3 WEEKS, PHASE 3X 6 WEEKS  S/P LEFT SHOULDER OPEN SUBSCAPULARIS AND SSP REPAIR          Subjective      Rates pain: Left shoulder 3-4/10 this morning, a little better now.    Next follow up with surgeon:  8 weeks from 17 visit (end of August).        Objective    Today's Intervention:      RUE ROM: FF = 153 ABD = 170 ER = 104 IR = 33    LUE ROM:  DATE          Flexion  140         Abduction  143         External Rotation 43 @30 74 @70 91 @90         Internal Rotation 35@30 25 @70 34 @90              - UBE 3 min warm-up  - LUE FF stretch, 20 sec x 3    - PROM for Left shoulder into FF, ABD, IR, ER.  Sup/Inf and Ant/Post glenohumeral joint mobilizations, grade III.   Patient supine with small bolster under knees.    - supine FF, 1# 30 x 1  - supine horizontal ABD/ADD, 2# 30 x 1  - cw/ccw circles, 1# 30 x 1 each  - chest press, 15# 30 x 1  - scapular pro/retraction, 2# 30 x 1  - sidelying ABD, 1# 30 x 1    - D1 Flexion, 2# 30 x 1      - Cold with compression using Game Ready system, left shoulder sleeve, 34 degrees, low compression, 15 min.  Patient in seated position with LUE supported by armrest of chair.      Next Visit:  Progress per protocol.      Home Exercise Program:    6-6-17:  Tabletop FF, scap retraction, pendulum  6-12-17:  4-corners stretches (did not add ER today)  6-27-17:  ER stretch, standing RC stabilization progression  6-29-17: supine RC stabilization        Assessment    Therapist Assessment / Clinical Impression:  Progression of wt for RC stabilization exercises.        Goals:  Patient goal:  Improve LUE function in 8 weeks.    Functional Short Term Goals:  (2 weeks):   - Patient will demonstrate correct performance of HEP with minimal to no cues required for full ROM and strength of elbow, forearm, wrist, and hand of RUE.  Goal met 6-12-17  3 weeks:   - Passive ROM to 90 degrees flexion for progression toward return of full ROM and ability to reach. Goal met 6-12-17  - Patient will report compliance with initial HEP for ongoing forearm and hand ROM/Strength. Goal met 6-12-17  6 weeks:   - Patient will demonstrate full RUE FF, ABD, ER, and IR for ability to progress toward return of functional use of RUE.   - Patient will tolerate weaning from sling without increased pain indicating adequate healing and muscle control/support.   9 weeks:   - Patient will demonstrate full Active ROM of RUE for ability to perform self cares and reaching with minimal to no limitation.   - Patient will report elimination of sleep disruption.   12 weeks:   - Patient will demonstrate 5/5 strength testing for increased ability to perform lifting and carrying tasks.   - Patient is  to self-manage symptoms including continuation of HEP.       Completed 6-6-17:  Patient Specific Functional and Pain Scales (PSFS)  Clinician Instructions: Complete after the history and before the exam.   Initial Assessment:   We want to know what 3 activities in your life you are unable to perform, or are having the most difficulty performing, as a result of your chief problem. Please list and score at least 3 activities that you are unable to perform, or having the most difficulty performing, because of your chief problem.   Patient Specific Activity Scoring Scheme (score one number for each activity):   Activity Score (0-10)  0= Unable to perform activity  10= Able to perform activity at same level as before injury or problem   1. Reaching overhead 0/10   2. lifting 0/10   3. Work related tasks 0/10   Totals:  0/10   Patient verbally states that they understand that the information they have provided above is current and complete to the best of their knowledge.   Patient Specific Functional Scale Modifier Scale Conversion: (patient's modifier that correlates with pt's score on PSFS): 0-CN (100% Impaired).  Initial Primary G Code and Modifier:    Per the Patient's intake and/or assessment the Primary G Code is: Handling Objects .   The Patient's Impairment, Limitation or Restriction Modifier would be best described as: CN - 100% Impairment.   Goal Primary G Code and Modifier:    The Patient's G Code Goal would be: Handling Objects    The Patient's Impairment, Limitation or Restriction Modifier goal would be best described as: CI - 1% - 20% Impairment.        Response to Intervention:  Good tolerance to treatment     Plan    Treatment Plan:      Frequency:   12 visits    Duration of Treatment: 12 weeks    Planned Interventions:    Home Exercise Program development  Therapeutic Exercise (ROM & Strengthening)  Therapeutic Activities  Neuromuscular Re-education  Manual Therapy  Ultrasound  E-Stim  Hot Packs  / Cold Pack Modalities  Vasopneumatic Compression with Cold Therapy ( Game Ready )  Phonophoresis with Ketoprofen  Iontophoresis with Dexamethasone    Plan for next visit:  See above

## 2017-12-28 NOTE — PATIENT INSTRUCTIONS
Patient Information     Patient Name Meliton Llanos [id number] Male 1975      Patient Instructions by Rachel Lovett NP at 2017 11:58 AM     Author:  Rachel Lovett NP  Service:  (none) Author Type:  PHYS- Nurse Practitioner     Filed:  2017 11:58 AM  Encounter Date:  2017 Status:  Addendum     :  Rachel Lovett NP (PHYS- Nurse Practitioner)        Related Notes: Original Note by Rachel Lovett NP (PHYS- Nurse Practitioner) filed at 2017 11:58 AM            Rocephin given in clinic today  Azithromycin 1000 mg take all at once today       Index Lao Related topics   Sexually Transmitted Diseases   ________________________________________________________________________  KEY POINTS    Sexually transmitted diseases (STDs) are infections that pass from one person to another during sexual contact. Some of the more common STDs are chlamydia, gonorrhea, herpes, crab lice, syphilis, HPV and genital warts, trichomonas, HIV/AIDS, and hepatitis.    Some STDs can be cured with antibiotic medicine, especially when they are diagnosed and treated early. There is no cure for STDs caused by a virus, like herpes, HIV, HPV, and genital warts. However, treatment of these infections can help decrease discomfort and help avoid complications.    You can help prevent STDs if you use latex or polyurethane condoms during foreplay and every time you have vaginal, oral, or anal sex. Some STDs can be prevented by a vaccine.  ________________________________________________________________________  What are sexually transmitted diseases?  Sexually transmitted diseases (STDs) are infections that pass from one person to another during sexual contact. They may also be called sexually transmitted infections, or STIs. Some of the more common STDs are chlamydia, gonorrhea, herpes, crab lice, syphilis, HPV and genital warts, trichomonas, HIV (the virus that causes AIDS), and hepatitis A, B, and C.  Some of these diseases are more dangerous than others. Some can be prevented with vaccines or cured with antibiotics, but for others, like herpes or HIV, there is no cure. Some can make you very sick or cause death.  You can have one of these diseases and not know it because you don't have any symptoms and don't feel sick. You can then spread the disease to sexual partners. Or you may know that you have an STD but are too embarrassed to talk about it with your sexual partner. Sexual partners can get the disease if you don t practice safe sex every time.  STDs can make it hard or impossible for a woman to get pregnant. They can also increase the risk that a woman will have a tubal pregnancy, which can be very dangerous. Some infections may increase the risk for early labor and premature birth. STDs can spread from a pregnant mother to her baby and cause the baby to have birth defects or die.  Males can also become infertile from gonorrhea or chlamydia infections.  What is the cause?  Bacteria, viruses, and parasites cause STDs. They are usually passed between partners during sex. This includes vaginal intercourse, anal intercourse, oral sex, skin-to-skin contact in the genital area, kissing, and the use of sex toys, such as vibrators. Hepatitis B, hepatitis C, and HIV can also spread through IV drug use.  What are the symptoms?  The symptoms depend on the type of STD. Some STDs may not cause symptoms until years after you are infected. Others may start within a few days. Symptoms may include:    Burning or pain when urinating    Unusual discharge from the vagina or penis    Itching, burning, or pain around the vagina, penis, or rectum    Rashes, sores, blisters, or painless wart-like growths around the vagina, penis, or rectum    Pain in the belly, penis, or vagina with sex    Sore throat    Vaginal bleeding between menstrual periods  How are they diagnosed?  Your healthcare provider will ask about your symptoms and  medical history and examine you. You may have tests, such as blood or urine tests.  How are they treated?  Some STDs can be cured with antibiotic medicine, especially when they are diagnosed and treated early. There is no cure for STDs caused by a virus, like herpes, HIV, HPV, and genital warts. However, treatment of these infections can help decrease discomfort and help avoid complications. If you cannot afford to pay for treatment, most [facility] have an STD clinic or [facility] department where visits are free of charge or cost a very small amount.  You can get the same STD again, even if you have had it once and have been treated.  How can I take care of myself?  Follow the full course of treatment prescribed by your healthcare provider. Ask your healthcare provider:    How and when you will get your test results    What other STDs or STIs you should be tested for    How long it will take to recover    If there are activities you should avoid and when you can return to normal activities    When it is safe to start having sex again    How to take care of yourself at home    What symptoms or problems you should watch for and what to do if you have them  Make sure you know when you should come back for a checkup. Keep all appointments for provider visits or tests.    Tell everyone with whom you have had sex in the last 3 months about your infection. Or you can ask the clinic staff to tell them. They will not use your name. Your sexual contacts need to be treated even if they don t have any symptoms.    Don t have sex until both you and your partner have finished all of the medicine and your provider says it's OK. Then always use condoms every time you have sex.  How can I help prevent STDs?    Use latex or polyurethane condoms during foreplay and every time you have vaginal, oral, or anal sex.    Have just 1 sexual partner who is not sexually active with anyone else.    If you have had sex without a condom and  are worried that you may have been infected, see your healthcare provider even if you don t have symptoms.    If you have been raped or sexually assaulted, you may need to be treated to prevent STDs. You should have an exam within a few hours of the assault (and before showering or bathing) even if you don t want to press charges.  Some STDs can be prevented by a vaccine.     The HPV vaccine prevents types of HPV (human papillomavirus) infection that are high risk for genital warts and cancer of the cervix. HPV shots are approved for females and males aged 9 to 26. It s best to get the HPV vaccine before having sex for the first time.    Shots that prevent hepatitis B infection have been given to newborns in the [place] since the early 1990s. You should get the shots if you did not get them as a baby and are 12 to 24 years old or at risk of infection.    A shot to protect against hepatitis A is also available for people at risk (for example, men who have sex with men).  You can get more information from:    Your healthcare provider or the health department    A family planning or STD clinic    The Centers for Disease Control (CDC)  905.484.4509  http://www.cdc.gov/std  Developed by alike.  Adult Advisor 2016.3 published by alike.  Last modified: 2016-03-23  Last reviewed: 2015-03-24  This content is reviewed periodically and is subject to change as new health information becomes available. The information is intended to inform and educate and is not a replacement for medical evaluation, advice, diagnosis or treatment by a healthcare professional.  References   Adult Advisor 2016.3 Index    Copyright   2016 alike, a division of [employer] All rights reserved.

## 2017-12-28 NOTE — TELEPHONE ENCOUNTER
Patient Information     Patient Name MRN Sex Meliton Stone [id number] Male 1975      Telephone Encounter by Polly Goldman at 2017  9:11 AM     Author:  Polly Goldman Service:  (none) Author Type:  (none)     Filed:  2017  9:12 AM Encounter Date:  2017 Status:  Signed     :  Polly Goldman            Patient referred by Dr. Mccarty for a EGD,  Diagnosis is Dyspepsia.  Please advise. Thank you. Polly Goldman ....................  2017   9:12 AM

## 2017-12-28 NOTE — TELEPHONE ENCOUNTER
Patient Information     Patient Name MRN Meliton Gurrola [id number] Male 1975      Telephone Encounter by Sailaja Martin at 2017  2:21 PM     Author:  Sailaja Martin Service:  (none) Author Type:  (none)     Filed:  2017  2:25 PM Encounter Date:  2017 Status:  Signed     :  Sailaja Martin            Patient called wanting to know if test results are back yet. Advised patient that we will call when results come back. Please advise when results come in.    Sailaja Martin ....................  2017   2:24 PM

## 2017-12-28 NOTE — TELEPHONE ENCOUNTER
Patient Information     Patient Name MRN Sex Meliton Stone [id number] Male 1975      Telephone Encounter by Hugo Caban RN at 2017  2:30 PM     Author:  Hugo Caban RN Service:  (none) Author Type:  NURS- Registered Nurse     Filed:  2017  2:43 PM Encounter Date:  2017 Status:  Signed     :  Hugo Caban RN (NURS- Registered Nurse)            Proton Pump Inhibitors    Office visit in the past 12 months or per provider note.    Last visit with CHRISTIE AYALA was on: 2017 in [facility]  Next visit with CHRISTIE AYALA is on: No future appointment listed with this provider  Next visit with Family Practice is on: No future appointment listed in this department    Max refill for 12 months from last office visit or per provider note.    Chart review shows that patient was last seen by PCP, of which patient's medications are noted to be reviewed on 16 for a preop. Omeprazole was reviewed as per office visit notes on that date. No changes noted in rx as requested. Patient is due for annual office visit. Writer will refill rx as requested for a 90 day supply at this time, and will send patient a reminder letter.     Prescription refilled per RN Medication Refill Policy.................... Hugo Caban RN ....................  2017   2:38 PM

## 2017-12-28 NOTE — TELEPHONE ENCOUNTER
Patient Information     Patient Name Meliton Llanos [id number] Male 1975      Telephone Encounter by Sailaja Martin at 2017 10:35 AM     Author:  Sailaja Martin  Service:  (none) Author Type:  (none)     Filed:  2017 10:41 AM  Encounter Date:  2017 Status:  Addendum     :  Kim Leroy        Related Notes: Original Note by Sailaja Martin filed at 2017 10:38 AM            Patient in [facility] stating he is still having symptoms from Friday's appointment and thinks he has chlamydia or gonorrhea. Please advise if patient can be worked in. He was seen on Wednesday, came in Friday and was put on an antibiotic  Sailaja Martin ....................  2017   10:38 AM

## 2017-12-28 NOTE — PROGRESS NOTES
Patient Information     Patient Name MRN Sex Meliton Stone [id number] Male 1975      Progress Notes by Tolu Mccarty MD at 2017  9:45 AM     Author:  Tolu Mccarty MD Service:  (none) Author Type:  Physician     Filed:  2017 11:42 AM Encounter Date:  2017 Status:  Signed     :  Tolu Mccarty MD (Physician)            SUBJECTIVE:    Meliton Finnegan is a 42 y.o. male who presents for urinary symptoms     HPI    Last Friday he was having some dysuria.  Had a UA, was normal.  Was given antibiotics while waiting for culture.  Did not help.   night he woke up in the middle of the night.  Had clear discharge from the urethra.  Worried it is chlamydia.  Yesterday was not able to hunt, went to urgent care yesterday.  He was tested there along with his new girlfriend.  He was treated with a shot but repeated gen probe was again normal.  Still lots of left flank pain into right upper quadrant.  No hematuria.  Has never had a kidney stone.  Significant nausea with voiding.  He says the pain is the worst he has ever felt in his life, more than shoulder surgery.    No Known Allergies,   Current Outpatient Prescriptions on File Prior to Visit       Medication  Sig Dispense Refill     omeprazole (PRILOSEC) 40 mg Delayed-Release capsule Take 1 capsule by mouth once daily. 90 capsule 4     trimethoprim-sulfamethoxazole, 160-800 mg, (BACTRIM DS, SEPTRA DS) tablet Take 1 tablet by mouth 2 times daily for 7 days. 14 tablet 0     No current facility-administered medications on file prior to visit.    , No past medical history on file. and   Past Surgical History:      Procedure  Laterality Date     HAND FINGER SURGERY      Tendon repair, right fifth finger, under anesthesia        OUTER EAR SURGERY      Left ear surgery for cartilage reconstruction        SHOULDER SURGERY Bilateral 2017     VASECTOMY       VASECTOMY      reversal scheduled for        WISDOM TEETH EXTRACTION       under anesthesia          REVIEW OF SYSTEMS:  Review of Systems   Constitutional: Negative for chills and fever.   Gastrointestinal: Positive for nausea. Negative for vomiting.   Genitourinary: Positive for flank pain and urgency. Negative for hematuria.       OBJECTIVE:  Temp 98.6  F (37  C) (Temporal)  Resp 16    EXAM:   Physical Exam   Constitutional: He is oriented to person, place, and time. He appears distressed.   Moderately uncomfortable.  Restless    Abdominal: Soft. He exhibits no distension. There is no tenderness. There is no rebound and no guarding.   Musculoskeletal:   Mild right flank pain on percussion   Neurological: He is alert and oriented to person, place, and time.   Skin: He is not diaphoretic.   Psychiatric: Memory, affect and judgment normal.     Results for orders placed or performed in visit on 11/07/17      URINALYSIS W REFLEX MICROSCOPIC IF POSITIVE      Result  Value Ref Range    COLOR                     Yellow Yellow Color    CLARITY                   Clear Clear Clarity    SPECIFIC GRAVITY,URINE    <=1.005 (A) 1.010, 1.015, 1.020, 1.025                    PH,URINE                  6.5 6.0, 7.0, 8.0, 5.5, 6.5, 7.5, 8.5                    UROBILINOGEN,QUALITATIVE  Normal Normal EU/dl    PROTEIN, URINE Negative Negative mg/dL    GLUCOSE, URINE Negative Negative mg/dL    KETONES,URINE             Negative Negative mg/dL    BILIRUBIN,URINE           Negative Negative                    OCCULT BLOOD,URINE        Negative Negative                    NITRITE                   Negative Negative                    LEUKOCYTE ESTERASE        Negative Negative                     CT is normal.    ASSESSMENT/PLAN:    ICD-10-CM    1. Right flank pain R10.9 URINALYSIS W REFLEX MICROSCOPIC IF POSITIVE      URINE CULTURE      CT ABDOMEN PELVIS STONE PROTOCOL WO      URINALYSIS W REFLEX MICROSCOPIC IF POSITIVE      URINE CULTURE        Plan:  Symptoms had a rather sudden onset, and he remains  uncomfortable, all arguing for a renal colic type source.  Most comon would be a stone.  Perhaps he has passed it with a normal CT scan.  Prostatitis also could cause some similar symptoms.  I want him to complete the septra and if not better, then change over to doxycycline.  30 mintutes total with patient, over 1/2 in coordination of care.    Tolu Mccarty MD ....................  11/7/2017   11:38 AM

## 2017-12-28 NOTE — PROGRESS NOTES
Patient Information     Patient Name MRN Sex Meliton Stone [id number] Male 1975      Progress Notes by Patricia Felton at 2017 11:00 AM     Author:  Patricia Felton Service:  (none) Author Type:  Other Clinical Staff     Filed:  2017 11:00 AM Date of Service:  2017 11:00 AM Status:  Signed     :  Patricia Felton (Other Clinical Staff)            Falls Risk Criteria:    Age 65 and older or under age 4        Sensory deficits    Poor vision    Use of ambulatory aides    Impaired judgment    Unable to walk independently    Meets High Risk criteria for falls:  no

## 2017-12-28 NOTE — PROGRESS NOTES
Patient Information     Patient Name MRN Sex Meliton Stone [id number] Male 1975      Progress Notes by Latisha Alcantar, PT at 2017  8:51 AM     Author:  Latisha Alcantar, PT Service:  (none) Author Type:  PT- Physical Therapist     Filed:  2017 12:42 PM Date of Service:  2017  8:51 AM Status:  Signed     :  Latisha Alcantar PT (PT- Physical Therapist)              [facility]  Outpatient PT - Daily Note    Date of Service: 2017   Visit #  Insurance Auth: Eval, 12 visits    PSFS Visit 7/10  PSFS Completed: 17    Patient Name: Meliton Finnegan   YOB: 1975   Referring MD/Provider: Dr. Kitchen  Medical and Treatment Diagnosis: S/P shoulder surgery [Z98.890]   PT Treatment Diagnosis: decreased UE mobility  Insurance: Workman's Compensation  Start of Service: 17  Certification Dates: Start of Service: 17  Medicare/MA Re-Cert Due: 17       Date of Surgery:  17  Details:  Left open subscapularis and SSP repair     Physician Notes:  EVAL AND TREAT, DOS 17,  1-3 X/WEEK FOR 12 WEEKS, PLEASE ISSUE PULLEY FOR HOME USE AS INDICATED.  BEGIN THERAPY 4 WEEKS FROM DATE OF SURGERY.  PHASE 2A X 3WEEKS, PHASE 2B X 3 WEEKS, PHASE 3X 6 WEEKS  S/P LEFT SHOULDER OPEN SUBSCAPULARIS AND SSP REPAIR          Subjective      Rates pain: Left shoulder 3/10.    Next follow up with surgeon:  8 weeks from 17 visit (end ).        Objective    Today's Intervention:      RUE ROM: FF = 153 ABD = 170 ER = 104 IR = 33    LUE ROM:  DATE  7-        Flexion  140 151        Abduction  143 155        External Rotation 43 @30 74 @70 91 @90 90        Internal Rotation 35@30 25 @70 34 @90 46             - UBE 3 min warm-up    - PROM for Left shoulder into FF, ABD, IR, ER.  Sup/Inf and Ant/Post glenohumeral joint mobilizations, grade III.  Patient supine with small bolster under knees.    -  supine FF, 2# 30 x 1  - supine horizontal ABD/ADD, 4# 30 x 1  - cw/ccw circles, 1# 30 x 1 each  - sidelying ABD, 2# 30 x 1    - D1 Flexion, 2# 30 x 1  - D1 Extension, 3 kg 30 x1 - demonstration and cues for technique  - D2 Extension, 3 kg 30 x 1  - D1 Flexion, 0# 30 x 1    - Forward bent Ext, 2# 30 x 1  - Forward bent Horiz Abd, 1# 30 x 1    MedX:  - Chest Press (seat 4): 60# 15 x 1; 80# 15 x 1      - Patient will ice at home today.      Next Visit:  Progress per protocol.      Home Exercise Program:    6-6-17:  Tabletop FF, scap retraction, pendulum  6-12-17:  4-corners stretches (did not add ER today)  6-27-17:  ER stretch, standing RC stabilization progression  6-29-17: supine RC stabilization        Assessment    Therapist Assessment / Clinical Impression:  Progression of RC stabilization exercises.        Goals:  Patient goal:  Improve LUE function in 8 weeks.    Functional Short Term Goals:  (2 weeks):   - Patient will demonstrate correct performance of HEP with minimal to no cues required for full ROM and strength of elbow, forearm, wrist, and hand of RUE.  Goal met 6-12-17  3 weeks:   - Passive ROM to 90 degrees flexion for progression toward return of full ROM and ability to reach. Goal met 6-12-17  - Patient will report compliance with initial HEP for ongoing forearm and hand ROM/Strength. Goal met 6-12-17  6 weeks:   - Patient will demonstrate full RUE FF, ABD, ER, and IR for ability to progress toward return of functional use of RUE.   - Patient will tolerate weaning from sling without increased pain indicating adequate healing and muscle control/support.   9 weeks:   - Patient will demonstrate full Active ROM of RUE for ability to perform self cares and reaching with minimal to no limitation.   - Patient will report elimination of sleep disruption.   12 weeks:   - Patient will demonstrate 5/5 strength testing for increased ability to perform lifting and carrying tasks.   - Patient is to self-manage  symptoms including continuation of HEP.       Completed 6-6-17:  Patient Specific Functional and Pain Scales (PSFS)  Clinician Instructions: Complete after the history and before the exam.   Initial Assessment:   We want to know what 3 activities in your life you are unable to perform, or are having the most difficulty performing, as a result of your chief problem. Please list and score at least 3 activities that you are unable to perform, or having the most difficulty performing, because of your chief problem.   Patient Specific Activity Scoring Scheme (score one number for each activity):   Activity Score (0-10)  0= Unable to perform activity  10= Able to perform activity at same level as before injury or problem   1. Reaching overhead 0/10   2. lifting 0/10   3. Work related tasks 0/10   Totals:  0/10   Patient verbally states that they understand that the information they have provided above is current and complete to the best of their knowledge.   Patient Specific Functional Scale Modifier Scale Conversion: (patient's modifier that correlates with pt's score on PSFS): 0-CN (100% Impaired).  Initial Primary G Code and Modifier:    Per the Patient's intake and/or assessment the Primary G Code is: Handling Objects .   The Patient's Impairment, Limitation or Restriction Modifier would be best described as: CN - 100% Impairment.   Goal Primary G Code and Modifier:    The Patient's G Code Goal would be: Handling Objects    The Patient's Impairment, Limitation or Restriction Modifier goal would be best described as: CI - 1% - 20% Impairment.        Response to Intervention:  Good tolerance to treatment     Plan    Treatment Plan:      Frequency:   12 visits    Duration of Treatment: 12 weeks    Planned Interventions:    Home Exercise Program development  Therapeutic Exercise (ROM & Strengthening)  Therapeutic Activities  Neuromuscular Re-education  Manual Therapy  Ultrasound  E-Stim  Hot Packs / Cold Pack  Modalities  Vasopneumatic Compression with Cold Therapy ( Game Ready )  Phonophoresis with Ketoprofen  Iontophoresis with Dexamethasone    Plan for next visit:  See above

## 2017-12-28 NOTE — PROGRESS NOTES
Patient Information     Patient Name MRN Sex Meliton Stone [id number] Male 1975      Progress Notes by Latisha Alcantar, PT at 2017  7:59 AM     Author:  Latisha Alcantar, PT Service:  (none) Author Type:  PT- Physical Therapist     Filed:  2017  8:45 AM Date of Service:  2017  7:59 AM Status:  Signed     :  Latisha Alcantar PT (PT- Physical Therapist)            [facility]  Outpatient PT - Daily Note    Date of Service: 2017   Visit #2  Insurance Auth: Eval, 12 visits    PSFS Visit 2/10  PSFS Completed: 17    Patient Name: Meliton Finnegan   YOB: 1975   Referring MD/Provider: Dr. Kitchen  Medical and Treatment Diagnosis: S/P shoulder surgery [Z98.890]   PT Treatment Diagnosis: decreased UE mobility  Insurance: Workman's Compensation  Start of Service: 17  Certification Dates: Start of Service: 17  Medicare/MA Re-Cert Due: 17       Date of Surgery:  17  Details:  Left open subscapularis and SSP repair     Physician Notes:  EVAL AND TREAT, DOS 17,  1-3 X/WEEK FOR 12 WEEKS, PLEASE ISSUE PULLEY FOR HOME USE AS INDICATED.  BEGIN THERAPY 4 WEEKS FROM DATE OF SURGERY.  PHASE 2A X 3WEEKS, PHASE 2B X 3 WEEKS, PHASE 3X 6 WEEKS  S/P LEFT SHOULDER OPEN SUBSCAPULARIS AND SSP REPAIR          Subjective      Rates pain: Left shoulder 2-3/10.  Continued occasional sleep disruption.  Has been quite active with household tasks.  Not wearing sling.  Reports compliance with tabletop stretches.      Next follow up with surgeon:  17 (Tuesday)        Objective    Today's Intervention:      RUE ROM: FF = 153 ABD = 170 ER = 104 IR = 33    LUE ROM:  DATE 6-6           Flexion NT           Abduction NT           External Rotation 43 @30           Internal Rotation 35@30              - PROM for Left shoulder into FF, other motions with minimal overpressure.  Patient supine with small bolster under  knees.    - 4-corners stretches:  FF, horiz ABD, IR at countertop, and post capsule.  * Added to HEP.  Patient was given a handout with picture and written instructions for exercise including guidelines for repetitions and frequency of performance.  Emphasis on no increased pain and being able to complete all reps.  Patient voiced understanding.    - Cold with compression using Game Ready system, left shoulder sleeve, 34 degrees, medium compression, 15 min.  Patient in seated position with pillow supporting LUE on armrest of chair.      Next Visit:  Progress per protocol.      Home Exercise Program:    6-6-17:  Tabletop FF, scap retraction, pendulum  6-12-17:  4-corners stretches (did not add ER today)        Assessment    Therapist Assessment / Clinical Impression:  Doing well with ROM.        Goals:  Patient goal:  Improve LUE function in 8 weeks.    Functional Short Term Goals:  (2 weeks):   - Patient will demonstrate correct performance of HEP with minimal to no cues required for full ROM and strength of elbow, forearm, wrist, and hand of RUE.  Goal met 6-12-17  3 weeks:   - Passive ROM to 90 degrees flexion for progression toward return of full ROM and ability to reach. Goal met 6-12-17  - Patient will report compliance with initial HEP for ongoing forearm and hand ROM/Strength. Goal met 6-12-17  6 weeks:   - Patient will demonstrate full RUE FF, ABD, ER, and IR for ability to progress toward return of functional use of RUE.   - Patient will tolerate weaning from sling without increased pain indicating adequate healing and muscle control/support.   9 weeks:   - Patient will demonstrate full Active ROM of RUE for ability to perform self cares and reaching with minimal to no limitation.   - Patient will report elimination of sleep disruption.   12 weeks:   - Patient will demonstrate 5/5 strength testing for increased ability to perform lifting and carrying tasks.   - Patient is to self-manage symptoms including  continuation of HEP.       Completed 6-6-17:  Patient Specific Functional and Pain Scales (PSFS)  Clinician Instructions: Complete after the history and before the exam.   Initial Assessment:   We want to know what 3 activities in your life you are unable to perform, or are having the most difficulty performing, as a result of your chief problem. Please list and score at least 3 activities that you are unable to perform, or having the most difficulty performing, because of your chief problem.   Patient Specific Activity Scoring Scheme (score one number for each activity):   Activity Score (0-10)  0= Unable to perform activity  10= Able to perform activity at same level as before injury or problem   1. Reaching overhead 0/10   2. lifting 0/10   3. Work related tasks 0/10   Totals:  0/10   Patient verbally states that they understand that the information they have provided above is current and complete to the best of their knowledge.   Patient Specific Functional Scale Modifier Scale Conversion: (patient's modifier that correlates with pt's score on PSFS): 0-CN (100% Impaired).  Initial Primary G Code and Modifier:    Per the Patient's intake and/or assessment the Primary G Code is: Handling Objects .   The Patient's Impairment, Limitation or Restriction Modifier would be best described as: CN - 100% Impairment.   Goal Primary G Code and Modifier:    The Patient's G Code Goal would be: Handling Objects    The Patient's Impairment, Limitation or Restriction Modifier goal would be best described as: CI - 1% - 20% Impairment.        Response to Intervention:  Good tolerance to treatment     Plan    Treatment Plan:      Frequency:   12 visits    Duration of Treatment: 12 weeks    Planned Interventions:    Home Exercise Program development  Therapeutic Exercise (ROM & Strengthening)  Therapeutic Activities  Neuromuscular Re-education  Manual Therapy  Ultrasound  E-Stim  Hot Packs / Cold Pack  Modalities  Vasopneumatic Compression with Cold Therapy ( Game Ready )  Phonophoresis with Ketoprofen  Iontophoresis with Dexamethasone    Plan for next visit:  See above

## 2017-12-28 NOTE — TELEPHONE ENCOUNTER
Patient Information     Patient Name MRN Meliton Gurrola [id number] Male 1975      Telephone Encounter by Kim Leroy at 11/3/2017 11:23 AM     Author:  Kim Leroy Service:  (none) Author Type:  (none)     Filed:  11/3/2017 11:23 AM Encounter Date:  11/3/2017 Status:  Signed     :  Kim Leroy            RX for burning with urination  Kim Leroy ....................  11/3/2017   11:23 AM

## 2017-12-28 NOTE — TELEPHONE ENCOUNTER
Patient Information     Patient Name MRN Meliton Gurrola [id number] Male 1975      Telephone Encounter by Tolu Mccarty MD at 2017 12:06 PM     Author:  Tolu Mccarty MD Service:  (none) Author Type:  Physician     Filed:  2017 12:06 PM Encounter Date:  2017 Status:  Signed     :  Tolu Mccarty MD (Physician)            I can see him tomorrow.  Tolu Mccarty MD ....................  2017   12:06 PM

## 2017-12-28 NOTE — TELEPHONE ENCOUNTER
Patient Information     Patient Name MRN Sex Meliton Stone [id number] Male 1975      Telephone Encounter by Tolu Mccarty MD at 11/3/2017  8:31 AM     Author:  Tolu Mccarty MD Service:  (none) Author Type:  Physician     Filed:  11/3/2017  8:32 AM Encounter Date:  11/3/2017 Status:  Signed     :  Tolu Mccarty MD (Physician)            Results for orders placed or performed in visit on 17      HIV 1 & 2      Result  Value Ref Range    HIV-1/HIV-2 ANTIBODY Non-Reactive Non-Reactive   COMPLETE METABOLIC PANEL      Result  Value Ref Range    SODIUM 140 133 - 143 mmol/L    POTASSIUM 4.4 3.5 - 5.1 mmol/L    CHLORIDE 105 98 - 107 mmol/L    CO2,TOTAL 27 21 - 31 mmol/L    ANION GAP 8 5 - 18                    GLUCOSE 101 70 - 105 mg/dL    CALCIUM 9.5 8.6 - 10.3 mg/dL    BUN 17 7 - 25 mg/dL    CREATININE 1.03 0.70 - 1.30 mg/dL    BUN/CREAT RATIO           17                    GFR if African American >60 >60 ml/min/1.73m2    GFR if not African American >60 >60 ml/min/1.73m2    ALBUMIN 4.3 3.5 - 5.7 g/dL    PROTEIN,TOTAL 7.0 6.4 - 8.9 g/dL    GLOBULIN                  2.7 2.0 - 3.7 g/dL    A/G RATIO 1.6 1.0 - 2.0                    BILIRUBIN,TOTAL 0.7 0.3 - 1.0 mg/dL    ALK PHOSPHATASE 47 34 - 104 IU/L    ALT (SGPT) 19 7 - 52 IU/L    AST (SGOT) 18 13 - 39 IU/L   CBC WITH AUTO DIFFERENTIAL      Result  Value Ref Range    WHITE BLOOD COUNT         4.6 4.5 - 11.0 thou/cu mm    RED BLOOD COUNT           5.18 4.30 - 5.90 mil/cu mm    HEMOGLOBIN                16.5 13.5 - 17.5 g/dL    HEMATOCRIT                48.0 37.0 - 53.0 %    MCV                       93 80 - 100 fL    MCH                       31.9 26.0 - 34.0 pg    MCHC                      34.4 32.0 - 36.0 g/dL    RDW                       12.3 11.5 - 15.5 %    PLATELET COUNT            143 140 - 440 thou/cu mm    MPV                       11.5 (H) 6.5 - 11.0 fL    NEUTROPHILS               67.1 42.0 - 72.0 %    LYMPHOCYTES                23.6 20.0 - 44.0 %    MONOCYTES                 7.6 <12.0 %    EOSINOPHILS               1.1 <8.0 %    BASOPHILS                 0.4 <3.0 %    IMMATURE GRANULOCYTES(METAS,MYELOS,PROS) 0.2 %    ABSOLUTE NEUTROPHILS      3.1 1.7 - 7.0 thou/cu mm    ABSOLUTE LYMPHOCYTES      1.1 0.9 - 2.9 thou/cu mm    ABSOLUTE MONOCYTES        0.4 <0.9 thou/cu mm    ABSOLUTE EOSINOPHILS      0.1 <0.5 thou/cu mm    ABSOLUTE BASOPHILS        0.0 <0.3 thou/cu mm    ABSOLUTE IMMATURE GRANULOCYTES(METAS,MYELOS,PROS) 0.0 <=0.3 thou/cu mm   GC CHLAMYDIA TRACH PROBE      Result  Value Ref Range    CHLAMYDIA PCR NOT Detected NOT Detected, Invalid    N GONORRHOEAE PCR NOT Detected NOT Detected, Invalid     Call him, the labs are all normal.  No sexually transmitted infection, but if having new urine symptoms, should come in and get a Urinalysis.  Tolu Mccarty MD ....................  11/3/2017   8:32 AM

## 2017-12-28 NOTE — TELEPHONE ENCOUNTER
Patient Information     Patient Name MRN Meliton Gurrola [id number] Male 1975      Telephone Encounter by Tolu Mccarty MD at 11/3/2017 12:33 PM     Author:  Tolu Mccarty MD Service:  (none) Author Type:  Physician     Filed:  11/3/2017 12:34 PM Encounter Date:  11/3/2017 Status:  Signed     :  Tolu Mccarty MD (Physician)            I faxed in antibiotics, while culture is pending.  Tolu Mccarty MD ....................  11/3/2017   12:34 PM

## 2017-12-28 NOTE — TELEPHONE ENCOUNTER
Patient Information     Patient Name MRN Meliton Gurrola [id number] Male 1975      Telephone Encounter by Polly Goldman at 2017  9:40 AM     Author:  Polly Goldman Service:  (none) Author Type:  (none)     Filed:  2017  9:42 AM Encounter Date:  2017 Status:  Signed     :  Polly Goldman            Screening Questions for the Scheduling of Screening Colonoscopies   (If Colonoscopy is diagnostic, Provider should review the chart before scheduling.)  Are you younger than 50 or older than 80?  YES   Do you take aspirin or fish oil?  NO  (if yes, tell patient to stop 1 week prior to Colonoscopy)  Do you take warfarin (Coumadin), clopidogrel (Plavix), apixaban (Eliquis), dabigatram (Pradaxa), rivaroxaban (Xarelto) or any blood thinner? NO   Do you use oxygen at home?  NO   Do you have kidney disease? NO  Are you on dialysis? NO  Have you had a stroke or heart attack in the last year? NO   Have you had a stent in your heart or any blood vessel in the last year? NO  Have you had a transplant of any organ? NO   Have you had a colonoscopy or upper endoscopy (EGD) before? NO          When?  NO   Date of scheduled EGD  -  11/15/2017  Provider [facility]

## 2017-12-28 NOTE — TELEPHONE ENCOUNTER
Patient Information     Patient Name MRN Sex Meliton Stone [id number] Male 1975      Telephone Encounter by Annalisa Servin LPN at 11/3/2017  2:52 PM     Author:  Annalisa Servin LPN Service:  (none) Author Type:  NURS- Licensed Practical Nurse     Filed:  11/3/2017  2:52 PM Encounter Date:  11/3/2017 Status:  Signed     :  Annalisa Servin LPN (NURS- Licensed Practical Nurse)            Left message for patient that medication was sent over.  Annalisa Servin LPN.........11/3/2017   2:52 PM

## 2017-12-28 NOTE — PROGRESS NOTES
Patient Information     Patient Name MRN Sex Meliton Stone [id number] Male 1975      Progress Notes by Rachel Lovett NP at 2017 12:00 PM     Author:  Rachel Lovett NP Service:  (none) Author Type:  PHYS- Nurse Practitioner     Filed:  2017  3:01 PM Encounter Date:  2017 Status:  Signed     :  Rachel Lovett NP (PHYS- Nurse Practitioner)            HPI:    Meliton Finnegan is a 42 y.o. male who presents to clinic today for STD concerns. He was seen in the past week for dysuria. Had negative gc/chlamydia 1 week ago, negative UA. Was tx with abx empirically for UTI. Comes in today with ongoing dysuria with penile discharge. Describes this as white/pus like. Does have new sexual partner in past month, she does not have sx currently. Reports pain lower abdomen, low back and penis pain.     No past medical history on file.  Past Surgical History:      Procedure  Laterality Date     HAND FINGER SURGERY      Tendon repair, right fifth finger, under anesthesia        OUTER EAR SURGERY      Left ear surgery for cartilage reconstruction        SHOULDER SURGERY Bilateral 2017     VASECTOMY       VASECTOMY      reversal scheduled for        WISDOM TEETH EXTRACTION      under anesthesia        Social History        Substance Use Topics          Smoking status:   Never Smoker      Smokeless tobacco:   Current User      Types:  Chew      Alcohol use   Yes      Comment: Occasional       Current Outpatient Prescriptions       Medication  Sig Dispense Refill     omeprazole (PRILOSEC) 40 mg Delayed-Release capsule Take 1 capsule by mouth once daily. 90 capsule 4     trimethoprim-sulfamethoxazole, 160-800 mg, (BACTRIM DS, SEPTRA DS) tablet Take 1 tablet by mouth 2 times daily for 7 days. 14 tablet 0     No current facility-administered medications for this visit.      Medications have been reviewed by me and are current to the best of my knowledge and ability.    No Known  Allergies    ROS:  Pertinent positives and negatives are noted in HPI.    EXAM:  General appearance: well appearing male, appears to be uncomfortable  Psychological: normal affect, alert and pleasant  Lab:   Results for orders placed or performed in visit on 11/06/17      GC CHLAMYDIA TRACH PROBE      Result  Value Ref Range    CHLAMYDIA PCR NOT Detected NOT Detected, Invalid    N GONORRHOEAE PCR NOT Detected NOT Detected, Invalid         ASSESSMENT/PLAN:    ICD-10-CM    1. Penile discharge R36.9 cefTRIAXone 250 mg injection (ROCEPHIN)      azithromycin (ZITHROMAX) 1 gram packet   2. Screen for STD (sexually transmitted disease) Z11.3 GC CHLAMYDIA TRACH PROBE      GC CHLAMYDIA TRACH PROBE      azithromycin (ZITHROMAX) 1 gram packet   Tx with Rocephin and azithromycin for suspected GC/Chlamydia. Results did return and were negative. He will f/u with PCP tomorrow for further evaluation of sx. All questions were answered and he is in agreement with plan.     Patient Instructions   Rocephin given in clinic today  Azithromycin 1000 mg take all at once today       Index Kyrgyz Related topics   Sexually Transmitted Diseases   ________________________________________________________________________  KEY POINTS    Sexually transmitted diseases (STDs) are infections that pass from one person to another during sexual contact. Some of the more common STDs are chlamydia, gonorrhea, herpes, crab lice, syphilis, HPV and genital warts, trichomonas, HIV/AIDS, and hepatitis.    Some STDs can be cured with antibiotic medicine, especially when they are diagnosed and treated early. There is no cure for STDs caused by a virus, like herpes, HIV, HPV, and genital warts. However, treatment of these infections can help decrease discomfort and help avoid complications.    You can help prevent STDs if you use latex or polyurethane condoms during foreplay and every time you have vaginal, oral, or anal sex. Some STDs can be prevented by a  vaccine.  ________________________________________________________________________  What are sexually transmitted diseases?  Sexually transmitted diseases (STDs) are infections that pass from one person to another during sexual contact. They may also be called sexually transmitted infections, or STIs. Some of the more common STDs are chlamydia, gonorrhea, herpes, crab lice, syphilis, HPV and genital warts, trichomonas, HIV (the virus that causes AIDS), and hepatitis A, B, and C. Some of these diseases are more dangerous than others. Some can be prevented with vaccines or cured with antibiotics, but for others, like herpes or HIV, there is no cure. Some can make you very sick or cause death.  You can have one of these diseases and not know it because you don't have any symptoms and don't feel sick. You can then spread the disease to sexual partners. Or you may know that you have an STD but are too embarrassed to talk about it with your sexual partner. Sexual partners can get the disease if you don t practice safe sex every time.  STDs can make it hard or impossible for a woman to get pregnant. They can also increase the risk that a woman will have a tubal pregnancy, which can be very dangerous. Some infections may increase the risk for early labor and premature birth. STDs can spread from a pregnant mother to her baby and cause the baby to have birth defects or die.  Males can also become infertile from gonorrhea or chlamydia infections.  What is the cause?  Bacteria, viruses, and parasites cause STDs. They are usually passed between partners during sex. This includes vaginal intercourse, anal intercourse, oral sex, skin-to-skin contact in the genital area, kissing, and the use of sex toys, such as vibrators. Hepatitis B, hepatitis C, and HIV can also spread through IV drug use.  What are the symptoms?  The symptoms depend on the type of STD. Some STDs may not cause symptoms until years after you are infected. Others  may start within a few days. Symptoms may include:    Burning or pain when urinating    Unusual discharge from the vagina or penis    Itching, burning, or pain around the vagina, penis, or rectum    Rashes, sores, blisters, or painless wart-like growths around the vagina, penis, or rectum    Pain in the belly, penis, or vagina with sex    Sore throat    Vaginal bleeding between menstrual periods  How are they diagnosed?  Your healthcare provider will ask about your symptoms and medical history and examine you. You may have tests, such as blood or urine tests.  How are they treated?  Some STDs can be cured with antibiotic medicine, especially when they are diagnosed and treated early. There is no cure for STDs caused by a virus, like herpes, HIV, HPV, and genital warts. However, treatment of these infections can help decrease discomfort and help avoid complications. If you cannot afford to pay for treatment, most [facility] have an STD clinic or [facility] department where visits are free of charge or cost a very small amount.  You can get the same STD again, even if you have had it once and have been treated.  How can I take care of myself?  Follow the full course of treatment prescribed by your healthcare provider. Ask your healthcare provider:    How and when you will get your test results    What other STDs or STIs you should be tested for    How long it will take to recover    If there are activities you should avoid and when you can return to normal activities    When it is safe to start having sex again    How to take care of yourself at home    What symptoms or problems you should watch for and what to do if you have them  Make sure you know when you should come back for a checkup. Keep all appointments for provider visits or tests.    Tell everyone with whom you have had sex in the last 3 months about your infection. Or you can ask the clinic staff to tell them. They will not use your name. Your sexual  contacts need to be treated even if they don t have any symptoms.    Don t have sex until both you and your partner have finished all of the medicine and your provider says it's OK. Then always use condoms every time you have sex.  How can I help prevent STDs?    Use latex or polyurethane condoms during foreplay and every time you have vaginal, oral, or anal sex.    Have just 1 sexual partner who is not sexually active with anyone else.    If you have had sex without a condom and are worried that you may have been infected, see your healthcare provider even if you don t have symptoms.    If you have been raped or sexually assaulted, you may need to be treated to prevent STDs. You should have an exam within a few hours of the assault (and before showering or bathing) even if you don t want to press charges.  Some STDs can be prevented by a vaccine.     The HPV vaccine prevents types of HPV (human papillomavirus) infection that are high risk for genital warts and cancer of the cervix. HPV shots are approved for females and males aged 9 to 26. It s best to get the HPV vaccine before having sex for the first time.    Shots that prevent hepatitis B infection have been given to newborns in the  since the early 1990s. You should get the shots if you did not get them as a baby and are 12 to 24 years old or at risk of infection.    A shot to protect against hepatitis A is also available for people at risk (for example, men who have sex with men).  You can get more information from:    Your healthcare provider or the health department    A family planning or STD clinic    The Centers for Disease Control (CDC)  821.738.4926  http://www.cdc.gov/std  Developed by [employer].  Adult Advisor 2016.3 published by [employer].  Last modified: 2016-03-23  Last reviewed: 2015-03-24  This content is reviewed periodically and is subject to change as new health information becomes available. The information is intended to inform and  educate and is not a replacement for medical evaluation, advice, diagnosis or treatment by a healthcare professional.  References   Adult Advisor 2016.3 Index    Copyright   2016 [employer], a division of [employer] All rights reserved.

## 2017-12-28 NOTE — TELEPHONE ENCOUNTER
Patient Information     Patient Name MRN Meliton Gurrola [id number] Male 1975      Telephone Encounter by Tolu Mccarty MD at 11/3/2017 10:55 AM     Author:  Tolu Mccarty MD Service:  (none) Author Type:  Physician     Filed:  11/3/2017 10:56 AM Encounter Date:  11/3/2017 Status:  Signed     :  Tolu Mccarty MD (Physician)            Is normal, have him drink lots of water and let me know in Monday if not better.    Tolu Mccarty MD ....................  11/3/2017   10:56 AM

## 2017-12-28 NOTE — TELEPHONE ENCOUNTER
Patient Information     Patient Name MRN Meliton Gurrola [id number] Male 1975      Telephone Encounter by Sailaja Martin at 2017 12:40 PM     Author:  Sailaja Martin Service:  (none) Author Type:  (none)     Filed:  2017 12:40 PM Encounter Date:  2017 Status:  Signed     :  Sailaja Martin            Patient seen in   and treated. Disregard.    Sailaja Martin ....................  2017   12:40 PM

## 2017-12-28 NOTE — PROGRESS NOTES
Patient Information     Patient Name MRN Sex Meliton Stone [id number] Male 1975      Progress Notes by Latisha Alcantar, PT at 2017  7:55 AM     Author:  Latisha Alcantar, PT Service:  (none) Author Type:  PT- Physical Therapist     Filed:  2017  8:45 AM Date of Service:  2017  7:55 AM Status:  Signed     :  Latisha Alcantar PT (PT- Physical Therapist)              [facility]  Outpatient PT - Daily Note    Date of Service: 2017   Visit #  Insurance Auth: Eval, 12 visits    PSFS Visit 9/10  PSFS Completed: 17; 17    Patient Name: Meliton Finnegan   YOB: 1975   Referring MD/Provider: Dr. Kitchen  Medical and Treatment Diagnosis: S/P shoulder surgery [Z98.890]   PT Treatment Diagnosis: decreased UE mobility  Insurance: Work[employer] Compensation  Start of Service: 17  Certification Dates: Start of Service: 17  Medicare/MA Re-Cert Due: 17       Date of Surgery:  17  Details:  Left open subscapularis and SSP repair     Physician Notes:  EVAL AND TREAT, DOS 17,  1-3 X/WEEK FOR 12 WEEKS, PLEASE ISSUE PULLEY FOR HOME USE AS INDICATED.  BEGIN THERAPY 4 WEEKS FROM DATE OF SURGERY.  PHASE 2A X 3WEEKS, PHASE 2B X 3 WEEKS, PHASE 3X 6 WEEKS  S/P LEFT SHOULDER OPEN SUBSCAPULARIS AND SSP REPAIR          Subjective      Rates pain: Left shoulder 2/10.  Was not seen last week due to being unavailable.  Has not been stretching.    Next follow up with surgeon:  8 weeks from 17 visit (end of August).        Objective    Today's Intervention:      RUE ROM: FF = 153 ABD = 170 ER = 104 IR = 33    LUE ROM:  DATE        Flexion  140 151 150       Abduction  143 155 150       External Rotation 43 @30 74 @70 91 @90 90 95       Internal Rotation 35@30 25 @70 34 @90 46 43            - UBE 3 min warm-up      TRX:    - push-up, 30 x 1  - fly, 25 x 1  - row, 30 x 1    -  standing FF, 1# 20 x 1  - abduction 1# 15 x 1  - sidelying ER 1# 30 x 1    - prone plank, 15 sec x 4  - side plank, 15 sec x 4      MedX:  - Torso Arm (seat 4):  200# 30 x 1  - Triceps press (seat 5): 160# 30 x 1  - Rows (chest 2):  120# 30 x 1  - Chest Press (seat 4): 150# 30 x 1    - standing IR at GRACIELA, 2kg, 15 x 2  - overhead dribble, 30 sec x 3    - Cold pack to left shoulder, 10 min.  Patient seated.      Next Visit:  Progress per protocol.      Home Exercise Program:    6-6-17:  Tabletop FF, scap retraction, pendulum  6-12-17:  4-corners stretches (did not add ER today)  6-27-17:  ER stretch, standing RC stabilization progression  6-29-17: supine RC stabilization  7-25-17:  Standing RC stabilization ex        Assessment    Therapist Assessment / Clinical Impression:  Progression of RC stabilization exercises.        Goals:  Patient goal:  Improve LUE function in 8 weeks.    Functional Short Term Goals:  (2 weeks):   - Patient will demonstrate correct performance of HEP with minimal to no cues required for full ROM and strength of elbow, forearm, wrist, and hand of RUE.  Goal met 6-12-17  3 weeks:   - Passive ROM to 90 degrees flexion for progression toward return of full ROM and ability to reach. Goal met 6-12-17  - Patient will report compliance with initial HEP for ongoing forearm and hand ROM/Strength. Goal met 6-12-17  6 weeks:   - Patient will demonstrate full RUE FF, ABD, ER, and IR for ability to progress toward return of functional use of RUE.  Progress made 7-25-17  - Patient will tolerate weaning from sling without increased pain indicating adequate healing and muscle control/support. Goal met 7-25-17  9 weeks:   - Patient will demonstrate full Active ROM of RUE for ability to perform self cares and reaching with minimal to no limitation. Progress made 7-25-17  - Patient will report elimination of sleep disruption. Goal met 7-25-17  12 weeks:   - Patient will demonstrate 5/5 strength testing for  increased ability to perform lifting and carrying tasks.   - Patient is to self-manage symptoms including continuation of HEP.       Completed 6-6-17:  Patient Specific Functional and Pain Scales (PSFS)  Clinician Instructions: Complete after the history and before the exam.   Initial Assessment:   We want to know what 3 activities in your life you are unable to perform, or are having the most difficulty performing, as a result of your chief problem. Please list and score at least 3 activities that you are unable to perform, or having the most difficulty performing, because of your chief problem.   Patient Specific Activity Scoring Scheme (score one number for each activity):   Activity Score (0-10)  0= Unable to perform activity  10= Able to perform activity at same level as before injury or problem   1. Reaching overhead 0/10   2. lifting 0/10   3. Work related tasks 0/10   Totals:  0/10   Patient verbally states that they understand that the information they have provided above is current and complete to the best of their knowledge.   Patient Specific Functional Scale Modifier Scale Conversion: (patient's modifier that correlates with pt's score on PSFS): 0-CN (100% Impaired).  Initial Primary G Code and Modifier:    Per the Patient's intake and/or assessment the Primary G Code is: Handling Objects .   The Patient's Impairment, Limitation or Restriction Modifier would be best described as: CN - 100% Impairment.   Goal Primary G Code and Modifier:    The Patient's G Code Goal would be: Handling Objects    The Patient's Impairment, Limitation or Restriction Modifier goal would be best described as: CI - 1% - 20% Impairment.       Completed 7-12-17:  Patient Specific Functional and Pain Scales (PSFS)  Activity Score (0-10)  0= Unable to perform activity  10= Able to perform activity at same level as before injury or problem   1. Reaching overhead 6/10   2. lifting 4/10   3. Work related tasks 3/10    Totals:  4.3/10   Patient verbally states that they understand that the information they have provided above is current and complete to the best of their knowledge.   Patient Specific Functional Scale Modifier Scale Conversion: (patient's modifier that correlates with pt's score on PSFS): 4-CL (60% Impaired).  Current Primary G Code and Modifier:    Per the Patient's intake and/or assessment the Primary G Code is: Handling Objects .   The Patient's Impairment, Limitation or Restriction Modifier would be best described as: CK - 40% - 60% Impairment.   Goal Primary G Code and Modifier:    The Patient's G Code Goal would be: Handling Objects    The Patient's Impairment, Limitation or Restriction Modifier goal would be best described as: CI - 1% - 20% Impairment.        Response to Intervention:  Good tolerance to treatment     Plan    Treatment Plan:      Frequency:   12 visits    Duration of Treatment: 12 weeks    Planned Interventions:    Home Exercise Program development  Therapeutic Exercise (ROM & Strengthening)  Therapeutic Activities  Neuromuscular Re-education  Manual Therapy  Ultrasound  E-Stim  Hot Packs / Cold Pack Modalities  Vasopneumatic Compression with Cold Therapy ( [employer] )  Phonophoresis with Ketoprofen  Iontophoresis with Dexamethasone    Plan for next visit:  See above

## 2017-12-30 NOTE — DISCHARGE SUMMARY
Patient Information     Patient Name MRN Sex Meliton Stone [id number] Male 1975      Discharge Summaries by Latisha Alcantar PT at 2017  7:59 AM     Author:  Latisha Alcantar PT Service:  (none) Author Type:  PT- Physical Therapist     Filed:  2017  9:37 AM Date of Service:  2017  7:59 AM Status:  Signed     :  Latisha Alcantar PT (PT- Physical Therapist)              [facility]  Outpatient PT - Discharge Note    Date of Service: 2017   Visit #10/12  Insurance Auth: Eval, 12 visits    PSFS Visit 2/10  PSFS Completed: 17; 17    Patient Name: Meliton Finnegan   YOB: 1975   Referring MD/Provider: Dr. Kitchen  Medical and Treatment Diagnosis: S/P shoulder surgery [Z98.890]   PT Treatment Diagnosis: decreased UE mobility  Insurance: Workman's Compensation  Start of Service: 17  Certification Dates: Start of Service: 17  Medicare/MA Re-Cert Due: 17       Date of Surgery:  17  Details:  Left open subscapularis and SSP repair     Physician Notes:  DORIS AND TREAT, DOS 17,  1-3 X/WEEK FOR 12 WEEKS, PLEASE ISSUE PULLEY FOR HOME USE AS INDICATED.  BEGIN THERAPY 4 WEEKS FROM DATE OF SURGERY.  PHASE 2A X 3WEEKS, PHASE 2B X 3 WEEKS, PHASE 3X 6 WEEKS  S/P LEFT SHOULDER OPEN SUBSCAPULARIS AND SSP REPAIR          Subjective      Rates pain: Left shoulder 1-2/10.  Reports increased compliance with stretching and started doing some weight lifting with light weights.      Started back to work this past Monday.  Has been able to do everything so far.  Still waiting for work release form from [employer].         Next follow up with surgeon:  8 weeks from 17 visit (end of August).        Objective    Today's Intervention:      RUE ROM: FF = 153 ABD = 170 ER = 104 IR = 33    LUE ROM:  DATE  7- 8-2      Flexion  140 151 150 155      Abduction  143 155 150 156      External  Rotation 43 @30 74 @70 91 @90 90 95 110      Internal Rotation 35@30 25 @70 34 @90 46 43 53           - UBE 3 min warm-up    - PROM for left shoulder, AP and sup/inf joint mobs grade IV.  Patient supine with small bolster under knees.    - Patient education for gradual progression of weightlifting with no pain.      Home Exercise Program:    6-6-17:  Tabletop FF, scap retraction, pendulum  6-12-17:  4-corners stretches (did not add ER today)  6-27-17:  ER stretch, standing RC stabilization progression  6-29-17: supine RC stabilization  7-25-17:  Standing RC stabilization ex        Assessment    Therapist Assessment / Clinical Impression:  Improved ROM especially IR and ER.  Patient feels strength is good and is able to return to work with no limitations.        Goals:  Patient goal:  Improve LUE function in 8 weeks.    Functional Short Term Goals:  (2 weeks):   - Patient will demonstrate correct performance of HEP with minimal to no cues required for full ROM and strength of elbow, forearm, wrist, and hand of RUE.  Goal met 6-12-17  3 weeks:   - Passive ROM to 90 degrees flexion for progression toward return of full ROM and ability to reach. Goal met 6-12-17  - Patient will report compliance with initial HEP for ongoing forearm and hand ROM/Strength. Goal met 6-12-17  6 weeks:   - Patient will demonstrate full RUE FF, ABD, ER, and IR for ability to progress toward return of functional use of RUE.  Goal met 8-2-17  - Patient will tolerate weaning from sling without increased pain indicating adequate healing and muscle control/support. Goal met 7-25-17  9 weeks:   - Patient will demonstrate full Active ROM of RUE for ability to perform self cares and reaching with minimal to no limitation. Goal met 8-2-17  - Patient will report elimination of sleep disruption. Goal met 7-25-17  12 weeks:   - Patient will demonstrate 5/5 strength testing for increased ability to perform lifting and carrying tasks.  Goal met 8-2-17   -  Patient is to self-manage symptoms including continuation of HEP. Goal met 8-2-17      Completed 6-6-17:  Patient Specific Functional and Pain Scales (PSFS)  Clinician Instructions: Complete after the history and before the exam.   Initial Assessment:   We want to know what 3 activities in your life you are unable to perform, or are having the most difficulty performing, as a result of your chief problem. Please list and score at least 3 activities that you are unable to perform, or having the most difficulty performing, because of your chief problem.   Patient Specific Activity Scoring Scheme (score one number for each activity):   Activity Score (0-10)  0= Unable to perform activity  10= Able to perform activity at same level as before injury or problem   1. Reaching overhead 0/10   2. lifting 0/10   3. Work related tasks 0/10   Totals:  0/10   Patient verbally states that they understand that the information they have provided above is current and complete to the best of their knowledge.   Patient Specific Functional Scale Modifier Scale Conversion: (patient's modifier that correlates with pt's score on PSFS): 0-CN (100% Impaired).  Initial Primary G Code and Modifier:    Per the Patient's intake and/or assessment the Primary G Code is: Handling Objects .   The Patient's Impairment, Limitation or Restriction Modifier would be best described as: CN - 100% Impairment.   Goal Primary G Code and Modifier:    The Patient's G Code Goal would be: Handling Objects    The Patient's Impairment, Limitation or Restriction Modifier goal would be best described as: CI - 1% - 20% Impairment.       Completed 7-13-17:  Patient Specific Functional and Pain Scales (PSFS)  Activity Score (0-10)  0= Unable to perform activity  10= Able to perform activity at same level as before injury or problem   1. Reaching overhead 6/10   2. lifting 4/10   3. Work related tasks 3/10   Totals:  4.3/10   Patient verbally states that they  understand that the information they have provided above is current and complete to the best of their knowledge.   Patient Specific Functional Scale Modifier Scale Conversion: (patient's modifier that correlates with pt's score on PSFS): 4-CL (60% Impaired).  Current Primary G Code and Modifier:    Per the Patient's intake and/or assessment the Primary G Code is: Handling Objects .   The Patient's Impairment, Limitation or Restriction Modifier would be best described as: CK - 40% - 60% Impairment.   Goal Primary G Code and Modifier:    The Patient's G Code Goal would be: Handling Objects    The Patient's Impairment, Limitation or Restriction Modifier goal would be best described as: CI - 1% - 20% Impairment.       Completed 8-2-17:  Patient Specific Functional and Pain Scales (PSFS)  Activity Score (0-10)  0= Unable to perform activity  10= Able to perform activity at same level as before injury or problem   1. Reaching overhead 8/10   2. lifting 5/10   3. Work related tasks 6/10   Totals:  6.3/10   Patient verbally states that they understand that the information they have provided above is current and complete to the best of their knowledge.   Patient Specific Functional Scale Modifier Scale Conversion: (patient's modifier that correlates with pt's score on PSFS): 6-CK (40% Impaired).  Goal Primary G Code and Modifier:    The Patient's G Code Goal would be: Handling Objects    The Patient's Impairment, Limitation or Restriction Modifier goal would be best described as: CI - 1% - 20% Impairment.   Discharge Primary G Code and Modifier:    The Patient's status upon Discharge is Handling Objects    The Patient's Impairment, Limitation or Restriction Modifier would be best described as: CJ - 20% - 40% Impairment.        Response to Intervention:  Good tolerance to treatment     Plan    Patient will be discharged per patient request.  Patient feels he is able to continue with self-management/HEP.   Has been able to return to work and reports no limitations at this time.

## 2017-12-30 NOTE — NURSING NOTE
Patient Information     Patient Name MRN Meliton Gurrola [id number] Male 1975      Nursing Note by Kim Leroy at 2017  9:45 AM     Author:  Kim Leroy Service:  (none) Author Type:  (none)     Filed:  2017 10:06 AM Encounter Date:  2017 Status:  Signed     :  Kim Leroy            Coming in for urination trouble, had pus coming out over the weekend. ? Kidney stone  Kim Leroy ....................  2017   9:52 AM

## 2017-12-30 NOTE — NURSING NOTE
Patient Information     Patient Name MRN Sex Meliton Stone [id number] Male 1975      Nursing Note by Kim Leroy at 2017  8:30 AM     Author:  Kim Leroy Service:  (none) Author Type:  (none)     Filed:  2017  8:43 AM Encounter Date:  2017 Status:  Signed     :  Kim Leroy            Coming in to have his 3rd finger Rt hand check, wakeboarding and had the rope pull the finger  Kim Leroy ....................  2017   8:37 AM

## 2017-12-30 NOTE — NURSING NOTE
Patient Information     Patient Name MRMeliton Allison [id number] Male 1975      Nursing Note by Ronald Lovett at 2017 12:00 PM     Author:  Ronald Lovett Service:  (none) Author Type:  (none)     Filed:  2017 11:51 AM Encounter Date:  2017 Status:  Signed     :  Ronald Lovett            Patient presents for screening of a STD. Patient states that he is in a lot of pain and was treated for a UTI previously.    Ronald Lovett ....................  2017   11:45 AM

## 2017-12-30 NOTE — NURSING NOTE
Patient Information     Patient Name MRN Sex Meliton Stone [id number] Male 1975      Nursing Note by Ronald Lovett at 2017 12:00 PM     Author:  Ronald Lovett Service:  (none) Author Type:  (none)     Filed:  2017 12:41 PM Encounter Date:  2017 Status:  Signed     :  Ronald Lovett            Lidocaine ordered by Rachel SANFORD.  Medication administered per written order   Route 2mL IM  Lot # [id number]  Exp. 2021  NDC 64360-982-22  Patient tolerated well.  Ronald Lovett LPN..............2017 12:31 PM

## 2017-12-30 NOTE — INITIAL ASSESSMENTS
Patient Information     Patient Name MRN Sex Meliton Stone [id number] Male 1975      Initial Assessments by Latisha Alcantar PT at 2017  8:00 AM     Author:  Latisha Alcantar PT Service:  (none) Author Type:  PT- Physical Therapist     Filed:  2017  8:51 AM Date of Service:  2017  8:00 AM Status:  Signed     :  Latisha Alcantar PT (PT- Physical Therapist)            [facility]  Outpatient PT - Initial Evaluation  Upper Extremity    Date of Service: 2017   Visit #  Insurance Auth: Eval, 12 visits    PSFS Visit 1/10  PSFS Completed: 17    Patient Name: Meliton Finnegan   YOB: 1975   Referring MD/Provider: Dr. Kitchen  Medical and Treatment Diagnosis: S/P shoulder surgery [Z98.890]   PT Treatment Diagnosis: decreased UE mobility  Insurance: Workman's Compensation  Start of Service: 17  Certification Dates: Start of Service: 17  Medicare/MA Re-Cert Due: 17     Living Situations:  Independent in Living Situation     Preadmission Functional Mobility: Independent   Precautions:  None   Cognition:  Oriented to Person, Place, and Time.     Were cultural / age or other special adaptations needed? No      Patient is a vulnerable adult: No      Patient is aware of diagnosis: Yes      Risks and benefits explained: Yes    Date of Surgery:  17  Details:  Left open subscapularis and SSP repair     Physician Notes:  DORIS AND TREAT, DOS 17,  1-3 X/WEEK FOR 12 WEEKS, PLEASE ISSUE PULLEY FOR HOME USE AS INDICATED.  BEGIN THERAPY 4 WEEKS FROM DATE OF SURGERY.  PHASE 2A X 3WEEKS, PHASE 2B X 3 WEEKS, PHASE 3X 6 WEEKS  S/P LEFT SHOULDER OPEN SUBSCAPULARIS AND SSP REPAIR    Subjective      Next follow up with surgeon:  17 (Tuesday)    History:  Was unloading a 4-brian and it fell on him.  Used UE to prevent it from falling on his head.  Tore mm in LUE and is still having pain in right shoulder as  well.      Rates pain: Left shoulder 3-4/10, Right shoulder 2/10 but sometimes 3-4/10  Radicular pain/numbness/tingling: None  Describes pain: ache  Locates pain: whole shoulder, deep  Aggravating: sleeping (trying to stay on back in a semi-reclined position)  Easing: used ice initially; ibuprofen prn        No past medical history on file.  Past Surgical History:      Procedure  Laterality Date     HAND FINGER SURGERY      Tendon repair, right fifth finger, under anesthesia        OUTER EAR SURGERY      Left ear surgery for cartilage reconstruction        VASECTOMY       VASECTOMY      reversal scheduled for 12/06       WISDOM TEETH EXTRACTION      under anesthesia             Prior Level:  Minimal to no difficulty completing functional activities.  History of right shoulder repair August 2016.     Occupation:  ; full-time; off work at this time    Previous Treatment:    Pain Meds / Anti-inflammatory Meds  - no longer  Physical Therapy - not for LUE  Injections - none  Surgery - above only  Pain Clinic   No    Diagnostics:  Reviewed (see chart)   Current Medications:  Reviewed (see chart)    Drug Allergies:  Reviewed (see chart)  ?   Latex Allergy:  No       Completed 6-6-17:  Patient Specific Functional and Pain Scales (PSFS)  Clinician Instructions: Complete after the history and before the exam.   Initial Assessment:   We want to know what 3 activities in your life you are unable to perform, or are having the most difficulty performing, as a result of your chief problem. Please list and score at least 3 activities that you are unable to perform, or having the most difficulty performing, because of your chief problem.   Patient Specific Activity Scoring Scheme (score one number for each activity):   Activity Score (0-10)  0= Unable to perform activity  10= Able to perform activity at same level as before injury or problem   1. Reaching overhead 0/10   2. lifting 0/10   3. Work related tasks 0/10   Totals:   0/10   Patient verbally states that they understand that the information they have provided above is current and complete to the best of their knowledge.   Patient Specific Functional Scale Modifier Scale Conversion: (patient's modifier that correlates with pt's score on PSFS): 0-CN (100% Impaired).  Initial Primary G Code and Modifier:    Per the Patient's intake and/or assessment the Primary G Code is: Handling Objects .   The Patient's Impairment, Limitation or Restriction Modifier would be best described as: CN - 100% Impairment.   Goal Primary G Code and Modifier:    The Patient's G Code Goal would be: Handling Objects    The Patient's Impairment, Limitation or Restriction Modifier goal would be best described as: CI - 1% - 20% Impairment.           Objective    Items left blank indicate that the test was inappropriate or not meaningful at the time of evaluation and therefore not performed.    Fall Risk Screening: No risk factors identified    ROM / Strength:                Norms Left Right   Shld.   Flexion 170     Shld. Abduction 170                External Rotation 90 43 @ 30 104 @ 90   Internal Rotation 70 35 @ 30 33 @ 90       Observation: Well healed anterior shoulder scar, one scope site at posterior shoulder.  No sign of infection.    Palpation: Mild tenderness at anterior pectoral muscle.    Special Tests:  NT      Today's Intervention:    - Evaluation  - Patient education for results of evaluation, goals, and treatment plan.  Patient voices understanding and agreement.    - Instruction for tabletop FF, codman's pendulum swings, and scapular retraction.  * Added to HEP.  Patient was given a handout with picture and written instructions for exercise including guidelines for repetitions and frequency of performance.  Patient voiced understanding.  Will continue previous elbow, forearm, hand activity.    - PROM for Left shoulder into FF, other motions with no overpressure.  Patient  supine with small bolster under knees.    - Cold with compression using Game Ready system, left shoulder sleeve, 34 degrees, medium compression, 15 min.  Patient in seated position with pillow supporting LUE on armrest of chair.      Next Visit:  Progress per protocol.      Home Exercise Program:    6-6-17:  Tabletop FF, scap retraction, pendulum        Assessment    Therapist Assessment / Clinical Impression:  Signs and symptoms consistent with above diagnosis of left shoulder surgery.  Significant functional limitations.  Not able to perform lifting, carrying, reaching activity per protocol.        Functional Impairment(s): See subjective on initial evaluation and Functional Assessment / Summary Report from PSFS.      Goals:  Patient goal:  Improve LUE function in 8 weeks.    Functional Short Term Goals:  (2 weeks):   - Patient will demonstrate correct performance of HEP with minimal to no cues required for full ROM and strength of elbow, forearm, wrist, and hand of RUE.  3 weeks:   - Passive ROM to 90 degrees flexion for progression toward return of full ROM and ability to reach.   - Patient will report compliance with initial HEP for ongoing forearm and hand ROM/Strength.   6 weeks:   - Patient will demonstrate full RUE FF, ABD, ER, and IR for ability to progress toward return of functional use of RUE.   - Patient will tolerate weaning from sling without increased pain indicating adequate healing and muscle control/support.   9 weeks:   - Patient will demonstrate full Active ROM of RUE for ability to perform self cares and reaching with minimal to no limitation.   - Patient will report elimination of sleep disruption.   12 weeks:   - Patient will demonstrate 5/5 strength testing for increased ability to perform lifting and carrying tasks.   - Patient is to self-manage symptoms including continuation of HEP.       Patient participated in goal selection and understand(s) the plan of care: Yes   Patient Potential  for Achieving Desired Outcome: Good     Response to Intervention:  Good tolerance to treatment     Plan    Treatment Plan:      Frequency:   12 visits    Duration of Treatment: 12 weeks    Planned Interventions:    Home Exercise Program development  Therapeutic Exercise (ROM & Strengthening)  Therapeutic Activities  Neuromuscular Re-education  Manual Therapy  Ultrasound  E-Stim  Hot Packs / Cold Pack Modalities  Vasopneumatic Compression with Cold Therapy ( Game Ready )  Phonophoresis with Ketoprofen  Iontophoresis with Dexamethasone    Plan for next visit:  See above    Thank you for your referral to [facility].  Please call with any questions, concerns or comments.  [phone]    The signature, of the referring medical provider, on this document indicates certification of the above prescribed plan of care and is medically necessary.          X____________________________________________________    Physician Signature                     Date  Time

## 2017-12-30 NOTE — NURSING NOTE
Patient Information     Patient Name MRN Meliton Gurrola [id number] Male 1975      Nursing Note by Kim Leroy at 2017 10:30 AM     Author:  Kim Leroy Service:  (none) Author Type:  (none)     Filed:  2017 10:48 AM Encounter Date:  2017 Status:  Signed     :  Kim Leroy            Having having abdomen pain, diarrhea, bloated, a lot of noise going on for one month. Worse after eating, no appetite, always clearing his throat  Kim Leroy ....................  2017   10:36 AM

## 2018-01-02 NOTE — PROGRESS NOTES
Patient Information     Patient Name MRN Meliton Gurrola [id number] Male 1975      Progress Notes by Tolu Mccarty MD at 2017  9:30 AM     Author:  Tolu Mccarty MD Service:  (none) Author Type:  Physician     Filed:  2017  1:18 PM Encounter Date:  2017 Status:  Signed     :  Tolu Mccarty MD (Physician)            SUBJECTIVE:    Meliton Finnegan is a 41 y.o. male who presents for anxiety    HPI    Continues to have anxiety symptoms since his dovirce about 1 year ago.  He has a new girlfriend, who also was  1 year ago and also has 2 kids, but he feels this relationship is not long term and feels bad braking up. I had given him Lorazepam in the past, has 5 tabs left still.  Used it once in a while for sleep mostly.  No other previous medications.  Is willing at this point to basically do whatever is needed to try to feel better.  Says he might have a little depression as well.    No Known Allergies,   Current Outpatient Prescriptions on File Prior to Visit       Medication  Sig Dispense Refill     omeprazole (PRILOSEC) 40 mg Delayed-Release capsule TAKE 1 CAPSULE BY MOUTH ONCE DAILY. 90 capsule 2     No current facility-administered medications on file prior to visit.    , No past medical history on file. and   Past Surgical History       Procedure   Laterality Date     Vasectomy        Outer ear surgery        Left ear surgery for cartilage reconstruction        [place]ye teeth extraction        under anesthesia        Hand finger surgery        Tendon repair, right fifth finger, under anesthesia        Vasectomy        reversal scheduled for          REVIEW OF SYSTEMS:  Review of Systems   Psychiatric/Behavioral: Negative for substance abuse and suicidal ideas. The patient is nervous/anxious and has insomnia.        OBJECTIVE:  /62  Resp 16  Wt 87 kg (191 lb 12.8 oz)  BMI 27.52 kg/m2    EXAM:   Physical Exam   Constitutional: He is oriented to  person, place, and time and well-developed, well-nourished, and in no distress. No distress.   Neurological: He is alert and oriented to person, place, and time.   Skin: He is not diaphoretic.   Psychiatric: His mood appears anxious. His affect is not blunt, not labile and not inappropriate. He is not agitated. He does not express impulsivity. He does not exhibit a depressed mood. He expresses no suicidal plans. He is not apathetic. He exhibits ordered thought content. He does not have a flat affect.       LEANA-7 ANXIETY SCREENING 10/15/2015 1/11/2017   LEANA date (doc flow) 10/15/2015 1/11/2017   Nervous, anxious 3 1   Cannot stop worrying 2 1   Worry about different things 0 1   Cannot relax 3 0   Feeling restless 1 1   Easily annoyed/irritated 0 1   Afraid of awful event 3 0   Score 12 5   Severity moderate anxiety mild anxiety     PHQ Depression Screening 8/17/2016 1/11/2017   Date of PHQ exam (doc flow) 8/17/2016 1/11/2017   1. Lack of interest/pleasure 0 - Not at all 1 - Several days   2. Feeling down/depressed 0 - Not at all 1 - Several days   PHQ-2 TOTAL SCORE 0 2   3. Trouble sleeping - 0 - Not at all   4. Decreased energy - 0 - Not at all   5. Appetite change - 0 - Not at all   6. Feelings of failure - 0 - Not at all   7. Trouble concentrating - 0 - Not at all   8. Activity level - 0 - Not at all   9. Hurting yourself - 0 - Not at all   PHQ-9 TOTAL SCORE - 2   PHQ-9 Severity Level - none   Functional Impairment - not difficult at all       ASSESSMENT/PLAN:    ICD-10-CM    1. Adjustment disorder with anxious mood F43.22 sertraline (ZOLOFT) 50 mg tablet        Plan:  I suspect he was underreporting a bit on the above tests.  I gave him the name of a great counselor, with the idea that for the anxiety this will be much more effective than the medications.  combining zoloft with counseling will have the best overall outcome.  Discussed with him possible side effects and would like a follow up in 4 weeks or so.  30/30 minutes counseling today.    Tolu Mccarty MD ....................  1/11/2017   1:18 PM

## 2018-01-03 NOTE — TELEPHONE ENCOUNTER
Patient Information     Patient Name MRN Meliton Gurrola [id number] Male 1975      Telephone Encounter by Tolu Mccarty MD at 2017  9:53 AM     Author:  Tolu Mccarty MD Service:  (none) Author Type:  Physician     Filed:  2017  9:53 AM Encounter Date:  2017 Status:  Signed     :  Tolu Mccarty MD (Physician)            Let him know I sill fax in a different med, will try prozac.  Tolu Mccarty MD ....................  2017   9:53 AM

## 2018-01-03 NOTE — TELEPHONE ENCOUNTER
Patient Information     Patient Name MRN Meliton Gurrola [id number] Male 1975      Telephone Encounter by Hannah Marks at 2017 11:00 AM     Author:  Hannah Marks Service:  (none) Author Type:  (none)     Filed:  2017 11:01 AM Encounter Date:  2017 Status:  Signed     :  Hannah Marks            Spoke with patient and he states he feels better than he did an hour ago. He will be will family all weekend and will start prozac tomorrow.    Hannah Marks LPN...................2017  11:01 AM

## 2018-01-03 NOTE — TELEPHONE ENCOUNTER
"Patient Information     Patient Name MRN Meliton Gurrola [id number] Male 1975      Telephone Encounter by Hannah Marks at 2017  9:25 AM     Author:  Hannah Marks Service:  (none) Author Type:  (none)     Filed:  2017  9:29 AM Encounter Date:  2017 Status:  Signed     :  Hannah Marks            Patient calling in regards to starting sertraline three days ago and he states he is experiencing increased anxiety with panic attacks and \"bad thoughts\". Advised patient to stop medication and will send message to Tolu Mccarty MD to see which medication he would recommend for his depression.     Hannah Marks LPN...................2017  9:28 AM        "

## 2018-01-03 NOTE — TELEPHONE ENCOUNTER
Patient Information     Patient Name MRN Meliton Gurrola [id number] Male 1975      Telephone Encounter by Tolu Mccarty MD at 2017 10:20 AM     Author:  Tolu Mccarty MD Service:  (none) Author Type:  Physician     Filed:  2017 10:20 AM Encounter Date:  2017 Status:  Signed     :  Tolu Mccarty MD (Physician)            Start prozac tomorrow.  Tolu Mccarty MD ....................  2017   10:20 AM

## 2018-01-03 NOTE — PROGRESS NOTES
Patient Information     Patient Name MRN Sex Meliton Stone [id number] Male 1975      Progress Notes by Tolu Mccarty MD at 2017 11:15 AM     Author:  Tolu Mccarty MD Service:  (none) Author Type:  Physician     Filed:  2017 12:26 PM Encounter Date:  2017 Status:  Signed     :  Tolu Mccarty MD (Physician)            SUBJECTIVE:    Meliton Finnegan is a 41 y.o. male who presents for follow up prozac    HPI    He had significant side effects from the zoloft, including suicidal thinking.  Was able to get in touch with his dad who came over and quickly we were able to change this to prozac.  He feels this is really working well.  Is also exercising and eating a healthy diet.  Completed therapy with [facility]  Patient says he gets down yearly in the winter and feels he might have SAD.  Had also ended the relationship with the woman he had been seeing, which actually has improved his mood a bit.    No Known Allergies,   Current Outpatient Prescriptions on File Prior to Visit       Medication  Sig Dispense Refill     FLUoxetine (PROZAC) 20 mg capsule Take 1 capsule by mouth every morning. 90 capsule 3     omeprazole (PRILOSEC) 40 mg Delayed-Release capsule TAKE 1 CAPSULE BY MOUTH ONCE DAILY. 90 capsule 2     No current facility-administered medications on file prior to visit.    , No past medical history on file. and   Past Surgical History       Procedure   Laterality Date     Vasectomy        Outer ear surgery        Left ear surgery for cartilage reconstruction        [place] teeth extraction        under anesthesia        Hand finger surgery        Tendon repair, right fifth finger, under anesthesia        Vasectomy        reversal scheduled for          REVIEW OF SYSTEMS:  ROS    OBJECTIVE:  /62  Resp 16  Wt 84 kg (185 lb 3.2 oz)  BMI 26.57 kg/m2    EXAM:   Physical Exam   Constitutional: He is oriented to person, place, and time and well-developed,  well-nourished, and in no distress. No distress.   Neurological: He is alert and oriented to person, place, and time.   Skin: He is not diaphoretic.   Psychiatric: Memory, affect and judgment normal.       PHQ Depression Screening 1/11/2017 2/17/2017   Date of PHQ exam (doc flow) 1/11/2017 2/17/2017   1. Lack of interest/pleasure 1 - Several days 0 - Not at all   2. Feeling down/depressed 1 - Several days 0 - Not at all   PHQ-2 TOTAL SCORE 2 0   3. Trouble sleeping 0 - Not at all 2 - More than half the days   4. Decreased energy 0 - Not at all 2 - More than half the days   5. Appetite change 0 - Not at all 0 - Not at all   6. Feelings of failure 0 - Not at all 0 - Not at all   7. Trouble concentrating 0 - Not at all 0 - Not at all   8. Activity level 0 - Not at all 0 - Not at all   9. Hurting yourself 0 - Not at all 0 - Not at all   PHQ-9 TOTAL SCORE 2 4   PHQ-9 Severity Level none none   Functional Impairment not difficult at all not difficult at all       ASSESSMENT/PLAN:    ICD-10-CM    1. Adjustment disorder with anxious mood F43.22         Plan:  Overall he is really dong well.  He wants to get off the med, so I will have him stop it in April or so, to cover for the winter SAD symptoms.  Then should resume it again around October or so.  15/15 minutes counseling and follow up as needed.  Tolu Mccarty MD ....................  2/17/2017   12:26 PM

## 2018-01-04 NOTE — PROGRESS NOTES
Patient Information     Patient Name MRN Sex Meliton Stone [id number] Male 1975      Progress Notes by Carmelina Mcdaniels at 2017  3:14 PM     Author:  Carmelina Mcdaniels Service:  (none) Author Type:  (none)     Filed:  2017  3:14 PM Date of Service:  2017  3:14 PM Status:  Signed     :  Carmelina Mcdaniels            Falls Risk Criteria:    Age 65 and older or under age 4        Sensory deficits    Poor vision    Use of ambulatory aides    Impaired judgment    Unable to walk independently    Meets High Risk criteria for falls:  no

## 2018-01-04 NOTE — PROGRESS NOTES
Patient Information     Patient Name MRN Sex Meliton Stone [id number] Male 1975      Progress Notes by Carmelina Mcdaniels at 2017  3:14 PM     Author:  Carmelina Mcdaniels Service:  (none) Author Type:  (none)     Filed:  2017  3:14 PM Date of Service:  2017  3:14 PM Status:  Signed     :  Carmelina Mcdaniels            [facility] Protocol    A. Pre-procedure verification complete yes  1-relevant information / documentation available, reviewed and properly matched to the patient; 2-consent accurate and complete, 3-equipment and supplies available    B. Site marking complete Yes  Site marked if not in continuous attendance with patient    C. TIME OUT completed yes  Time Out was conducted just prior to starting procedure to verify the eight required elements: 1-patient identity, 2-consent accurate and complete, 3-position, 4-correct side/site marked (if applicable), 5-procedure, 6-relevant images / results properly labeled and displayed (if applicable), 7-antibiotics / irrigation fluids (if applicable), 8-safety precautions.

## 2018-01-04 NOTE — NURSING NOTE
Patient Information     Patient Name MRN Sex Meliton Stone [id number] Male 1975      Nursing Note by Kim Leroy at 2017  8:45 AM     Author:  Kim Leroy Service:  (none) Author Type:  (none)     Filed:  2017  9:20 AM Encounter Date:  2017 Status:  Signed     :  Kim Leroy            Coming in to have his Rt big toe looked at the color is orange, times one week. No injury  Kim Leroy ....................  2017   8:55 AM

## 2018-01-04 NOTE — PROGRESS NOTES
Patient Information     Patient Name MRN Sex Meliton Stone [id number] Male 1975      Progress Notes by Tolu Mccarty MD at 2017  8:45 AM     Author:  Tolu Mccarty MD Service:  (none) Author Type:  Physician     Filed:  2017  9:34 AM Encounter Date:  2017 Status:  Signed     :  Tolu Mccarty MD (Physician)            SUBJECTIVE:    Meliton Finnegan is a 42 y.o. male who presents for t toenail color changes    HPI    He tore his rotator cuff at work last week, on the left.  Is seeing his orthopedist for this, has an MRI today.  Expects surgery.  Sees Dr. Sr.    He has been exercising a lot and in the last weeks notes an orange color to it.  Has been running on an elliptical a lot lately, wondering if he has injured it or gotten a fungus.    Wants a vasectomy.    No Known Allergies,   Current Outpatient Prescriptions on File Prior to Visit       Medication  Sig Dispense Refill     omeprazole (PRILOSEC) 40 mg Delayed-Release capsule TAKE 1 CAPSULE BY MOUTH ONCE DAILY. 90 capsule 2     No current facility-administered medications on file prior to visit.    , No past medical history on file. and   Past Surgical History:      Procedure  Laterality Date     HAND FINGER SURGERY      Tendon repair, right fifth finger, under anesthesia        OUTER EAR SURGERY      Left ear surgery for cartilage reconstruction        VASECTOMY       VASECTOMY      reversal scheduled for        WISDOM TEETH EXTRACTION      under anesthesia          REVIEW OF SYSTEMS:  Review of Systems   Constitutional: Negative for chills and fever.   Musculoskeletal: Positive for joint pain.   Skin: Negative for itching and rash.       OBJECTIVE:  /60  Resp 16  Wt 85.4 kg (188 lb 3.2 oz)  BMI 27 kg/m2    EXAM:   Physical Exam   Constitutional: He is oriented to person, place, and time and well-developed, well-nourished, and in no distress. No distress.   Neurological: He is alert and oriented  to person, place, and time.   Skin: He is not diaphoretic.   Bilateral great nails with slight orange color in distal 1/2.  No dystrophic changes at all.       ASSESSMENT/PLAN:    ICD-10-CM    1. Change in nail appearance R29.898    2. Surveillance for birth control, vasectomy Z30.8 AMB CONSULT TO UROLOGY        Plan:  The nails are not consistent with fungal changes currently.  I suspect more of a pseudomonas problem.  Will have him just observer for now.    Discussed with him the vasectomy process.  Given his complex history, previous surgery and reversal, so i would want him to see urology for this.  He really wants to think it over after we talked about it.    Tolu Mccarty MD ....................  4/27/2017   9:33 AM

## 2018-01-15 ENCOUNTER — OFFICE VISIT - GICH (OUTPATIENT)
Dept: UROLOGY | Facility: OTHER | Age: 43
End: 2018-01-15

## 2018-01-15 ENCOUNTER — HISTORY (OUTPATIENT)
Dept: UROLOGY | Facility: OTHER | Age: 43
End: 2018-01-15

## 2018-01-15 DIAGNOSIS — R30.0 DYSURIA: ICD-10-CM

## 2018-01-15 DIAGNOSIS — N34.2 OTHER URETHRITIS: ICD-10-CM

## 2018-01-27 VITALS
SYSTOLIC BLOOD PRESSURE: 120 MMHG | HEART RATE: 48 BPM | WEIGHT: 188.8 LBS | RESPIRATION RATE: 16 BRPM | DIASTOLIC BLOOD PRESSURE: 80 MMHG | DIASTOLIC BLOOD PRESSURE: 62 MMHG | SYSTOLIC BLOOD PRESSURE: 130 MMHG | WEIGHT: 191.8 LBS

## 2018-01-27 VITALS — RESPIRATION RATE: 16 BRPM | WEIGHT: 185.2 LBS | SYSTOLIC BLOOD PRESSURE: 122 MMHG | DIASTOLIC BLOOD PRESSURE: 62 MMHG

## 2018-01-27 VITALS
TEMPERATURE: 98.1 F | BODY MASS INDEX: 27.99 KG/M2 | SYSTOLIC BLOOD PRESSURE: 122 MMHG | WEIGHT: 189.6 LBS | HEART RATE: 64 BPM | DIASTOLIC BLOOD PRESSURE: 64 MMHG | TEMPERATURE: 98.6 F | BODY MASS INDEX: 27.28 KG/M2 | HEIGHT: 70 IN | BODY MASS INDEX: 27.14 KG/M2 | DIASTOLIC BLOOD PRESSURE: 78 MMHG | SYSTOLIC BLOOD PRESSURE: 120 MMHG | RESPIRATION RATE: 16 BRPM | WEIGHT: 195.5 LBS | WEIGHT: 183.4 LBS | RESPIRATION RATE: 16 BRPM | SYSTOLIC BLOOD PRESSURE: 126 MMHG | DIASTOLIC BLOOD PRESSURE: 66 MMHG | RESPIRATION RATE: 16 BRPM | HEIGHT: 70 IN

## 2018-01-27 VITALS
BODY MASS INDEX: 27.99 KG/M2 | WEIGHT: 188.2 LBS | RESPIRATION RATE: 16 BRPM | SYSTOLIC BLOOD PRESSURE: 120 MMHG | DIASTOLIC BLOOD PRESSURE: 60 MMHG

## 2018-01-30 ENCOUNTER — DOCUMENTATION ONLY (OUTPATIENT)
Dept: FAMILY MEDICINE | Facility: OTHER | Age: 43
End: 2018-01-30

## 2018-01-30 PROBLEM — F43.22 ADJUSTMENT DISORDER WITH ANXIOUS MOOD: Status: ACTIVE | Noted: 2017-01-11

## 2018-01-30 PROBLEM — R10.13 EPIGASTRIC PAIN: Status: ACTIVE | Noted: 2017-12-06

## 2018-01-30 PROBLEM — E78.00 HYPERCHOLESTEROLEMIA: Status: ACTIVE | Noted: 2018-01-30

## 2018-01-30 PROBLEM — A63.0 VENEREAL WART: Status: ACTIVE | Noted: 2018-01-30

## 2018-01-30 RX ORDER — SUCRALFATE 1 G/1
1 TABLET ORAL
COMMUNITY
Start: 2017-12-08 | End: 2018-05-10

## 2018-02-04 ASSESSMENT — PATIENT HEALTH QUESTIONNAIRE - PHQ9
SUM OF ALL RESPONSES TO PHQ QUESTIONS 1-9: 4
SUM OF ALL RESPONSES TO PHQ QUESTIONS 1-9: 2

## 2018-02-04 ASSESSMENT — ANXIETY QUESTIONNAIRES
GAD7 TOTAL SCORE: 5
GAD7 TOTAL SCORE: 3

## 2018-02-09 VITALS — BODY MASS INDEX: 27.48 KG/M2 | WEIGHT: 188.8 LBS | HEART RATE: 80 BPM | RESPIRATION RATE: 20 BRPM | TEMPERATURE: 96.9 F

## 2018-02-09 VITALS
BODY MASS INDEX: 26.11 KG/M2 | DIASTOLIC BLOOD PRESSURE: 80 MMHG | SYSTOLIC BLOOD PRESSURE: 116 MMHG | HEART RATE: 60 BPM | TEMPERATURE: 96.3 F | WEIGHT: 179.4 LBS

## 2018-02-09 VITALS
DIASTOLIC BLOOD PRESSURE: 74 MMHG | BODY MASS INDEX: 26.49 KG/M2 | WEIGHT: 182 LBS | SYSTOLIC BLOOD PRESSURE: 124 MMHG | RESPIRATION RATE: 14 BRPM

## 2018-02-12 NOTE — PATIENT INSTRUCTIONS
Patient Information     Patient Name MRN Meliton Gurrola [id number] Male 1975      Patient Instructions by Prince White MD at 2017 10:30 AM     Author:  Prince White MD Service:  (none) Author Type:  Physician     Filed:  2017 11:31 AM Encounter Date:  2017 Status:  Signed     :  Prince White MD (Physician)            Doxycycline as directed.    Aleve 1 tab with food twice a day.    Scrotal support.

## 2018-02-12 NOTE — PROGRESS NOTES
Patient Information     Patient Name MRN Sex Meliton Stone [id number] Male 1975      Progress Notes by Renate Acevedo NP at 2017  3:00 PM     Author:  Renate Acevedo NP Service:  (none) Author Type:  PHYS- Nurse Practitioner     Filed:  2017  5:48 PM Encounter Date:  2017 Status:  Signed     :  Renate Acevedo NP (PHYS- Nurse Practitioner)            Nursing Notes:   Skye Gilman  2017  3:47 PM  Signed  Patient presents to the clinic for painful urination. Seen Prince White MD two or three weeks ago. Was dx with urethritis. Was given antibiotic, but now symptoms are back.   Skye Gilman LPN............................ 2017 3:40 PM      HPI:   Meliton Finnegan is a 42 y.o. male who presents for bladder concerns.   He was seen last month for symptoms of penile discharge and dysuria, negative GC testing, treated prophylactic with Rocephin and Azithromycin.  Seen again and treated with Bactrim BID x 7 days for possible urethritis.  Thinks he passed a kidney stone which caused irritation of his urethra.  He was seen about 2 weeks ago for same symptoms, treated with Doxycycline x 14 days for urethritis.  States symptoms improved after one week of Doxycycline then returned again after being off the antibiotics for 4-5 days.  States his SO was also tested for GC and was negative.  Denies any new partners.  Symptoms currently for the past 2 days.  Symptoms include burning inside of tip of penis, sensitivity of scrotum and rectal area.  Denies any current penile discharge.  No pain with sitting.  No scrotal or penile swelling.  No scrotal discoloration.  No blood with urination.  No difficulty with urinary stream.  Emptying bladder without difficulty.  No fevers or chills.  No nausea or vomiting.  No abdominal pain, pressure or cramping.  No back pain.   Normal bowel movements.  No taking anything currently for symptoms.          Past Medical  History:     Diagnosis  Date     Epigastric pain 12/6/2017       Past Surgical History:      Procedure  Laterality Date     ESOPHAGOGASTRODUODENOSCOPY  12/7/2017            HAND FINGER SURGERY      Tendon repair, right fifth finger, under anesthesia        OUTER EAR SURGERY      Left ear surgery for cartilage reconstruction        SHOULDER SURGERY Bilateral 2017     VASECTOMY       VASECTOMY      reversal scheduled for 12/06       WISDOM TEETH EXTRACTION      under anesthesia          Social History        Substance Use Topics          Smoking status:   Never Smoker      Smokeless tobacco:   Current User      Types:  Chew      Alcohol use   Yes      Comment: Occasional         Current Outpatient Prescriptions       Medication  Sig Dispense Refill     FLUoxetine (PROZAC) 20 mg capsule TK 1 C PO QAM  3     omeprazole (PRILOSEC) 20 mg Delayed-Release capsule Once daily for 2 weeks, then once every other day for 2 weeks then stop. 21 capsule 0     sucralfate (CARAFATE) 1 gram tablet Take 1 tablet by mouth 3 times daily before meals. 90 tablet 3     No current facility-administered medications for this visit.      Medications have been reviewed by me and are current to the best of my knowledge and ability.      No Known Allergies    REVIEW OF SYSTEMS:  Refer to HPI.      EXAM:   Vitals:    Pulse 80  Temp 96.9  F (36.1  C) (Tympanic)  Resp 20  Wt 85.6 kg (188 lb 12.8 oz)  BMI 27.48 kg/m2    General Appearance: Pleasant, alert, appropriate appearance for age. No acute distress  Chest/Respiratory Exam: Normal chest wall and respirations. Clear to auscultation.  Cardiovascular Exam: Regular rate and rhythm. S1, S2, no murmur  Abdomen: soft, nontender, no masses or organomegally, no rebound tenderness or guarding, normal bowel sounds present  :  No suprapubic tenderness to palpation.  No CVA tenderness to palpation.    Genitourinary Exam Male: Normal male genitalia. No discharge or penile ulcerations.  No rash or  lesions.  No testicular masses or swelling or tenderness to palpation.  Musculoskeletal:  Normal gait.  Equal movement of bilateral upper extremities.  Equal movement of bilateral lower extremities.    Dermatological: no rashes noted of exposed skin  Psychiatric Exam: Alert and oriented - appropriate affect.        Labs:   Results for orders placed or performed in visit on 12/22/17      URINALYSIS W REFLEX MICROSCOPIC IF POSITIVE      Result  Value Ref Range    COLOR                     Yellow Yellow Color    CLARITY                   Clear Clear Clarity    SPECIFIC GRAVITY,URINE    1.015 1.010, 1.015, 1.020, 1.025                    PH,URINE                  7.0 6.0, 7.0, 8.0, 5.5, 6.5, 7.5, 8.5                    UROBILINOGEN,QUALITATIVE  Normal Normal EU/dl    PROTEIN, URINE Negative Negative mg/dL    GLUCOSE, URINE Negative Negative mg/dL    KETONES,URINE             Trace (A) Negative mg/dL    BILIRUBIN,URINE           Negative Negative                    OCCULT BLOOD,URINE        Negative Negative                    NITRITE                   Negative Negative                    LEUKOCYTE ESTERASE        Negative Negative                   GC CHLAMYDIA TRACH PROBE      Result  Value Ref Range    CHLAMYDIA PCR NOT Detected NOT Detected, Invalid    N GONORRHOEAE PCR NOT Detected NOT Detected, Invalid         ASSESSMENT AND PLAN:      ICD-10-CM    1. Nonspecific urethritis N34.1 AMB CONSULT TO UROLOGY      phenazopyridine (PYRIDIUM) 200 mg tablet   2. Urinary problem R39.89 URINALYSIS W REFLEX MICROSCOPIC IF POSITIVE      GC CHLAMYDIA TRACH PROBE      URINALYSIS W REFLEX MICROSCOPIC IF POSITIVE      GC CHLAMYDIA TRACH PROBE   3. Dysuria R30.0 AMB CONSULT TO UROLOGY      phenazopyridine (PYRIDIUM) 200 mg tablet       Symptoms consistent with urethritis - question if it could be from a possible kidney stone that he passed.  He has been tested x 2 for UTI and GC with negative results.  He has been treated with 4  antibiotics in the past month for his symptoms.    He is not having any signs/symptoms of infection at this time.  He is having persistent burning and pain at the meatus.    Urinalysis - no indications of infection, no hematuria  Gonorrhea and Chlamydia -  Negative   Encouraged increased fluids  Pyridium 200 mg TID PRN   Discussed warning signs symptoms including but not limited to: fever, chills, scrotal pain or swelling, penile discharge, hematuria, etc to be seen in clinic or ER  Referral to urology - should be seen next week        Patient Instructions   Pyridium every 8 hours as needed for burning and urinary pain    Referral to urologist    Follow up if blood, fever, discharge, swelling, worsening pain, concerns, etc         ZEHRA SHELTON NP..................12/22/2017 3:41 PM

## 2018-02-12 NOTE — NURSING NOTE
Patient Information     Patient Name MRN Meliton Gurrola [id number] Male 1975      Nursing Note by Skye Gilman at 2017  3:00 PM     Author:  Skye Gilman Service:  (none) Author Type:  NURS- Student Practical Nurse     Filed:  2017  3:47 PM Encounter Date:  2017 Status:  Signed     :  Skye Gilman (NURS- Student Practical Nurse)            Patient presents to the clinic for painful urination. Seen Prince White MD two or three weeks ago. Was dx with urethritis. Was given antibiotic, but now symptoms are back.   Skye Gilman, DARRICK............................ 2017 3:40 PM

## 2018-02-12 NOTE — NURSING NOTE
Patient Information     Patient Name MRMeliton Allison [id number] Male 1975      Nursing Note by Alexandria Interiano at 2017 10:30 AM     Author:  Alexandria Interiano Service:  (none) Author Type:  (none)     Filed:  2017 11:11 AM Encounter Date:  2017 Status:  Signed     :  Alexandria Interiano            He stated he feels he had a kidney stone in beginning of November. Now he is having low back pain, pain in testicles , rectum and end of his penis. He stated he started riding a bike 3 months ago.  Alexandria Interiano LPN..................2017   10:57 AM

## 2018-02-12 NOTE — PROGRESS NOTES
Patient Information     Patient Name MRN Meliton Gurrola [id number] Male 1975      Progress Notes by Prince White MD at 2017 10:30 AM     Author:  Prince White MD Service:  (none) Author Type:  Physician     Filed:  2017  1:00 PM Encounter Date:  2017 Status:  Signed     :  Prince White MD (Physician)            Nursing Notes:   Alexandria Interiano  2017 11:11 AM  Signed  He stated he feels he had a kidney stone in beginning of November. Now he is having low back pain, pain in testicles , rectum and end of his penis. He stated he started riding a bike 3 months ago.  Alexandria Interiano LPN..................2017   10:57 AM      SUBJECTIVE:  Meliton Finnegan  is a 42 y.o. male who comes in today with issues with his prostate. He was seen about a month ago with dysuria and had a normal UA. He was tested for GC/chlamydia which were negative. He was having some left flank pain and right upper quadrant pain. There was concern that he might have a stone but CT was negative. They think he passed it. He had some pus come out his urethra.  He was placed on Septra and treated suspecting prostatitis with the thought that he would change over to doxycycline if not improved.    He is having pain in his low back, testicles and the end of his penis. He had similar symptoms a month ago and had empiric treatment with Rocephin and Zithromax.     Since his treatment, he has had irritation of the urethra, some urinary urgency.  He also has been riding an exercise bike with a skinny seat.     Past Medical, Family, and Social History reviewed and updated as noted below.   ROS is negative except as noted above       No Known Allergies,   Family History       Problem   Relation Age of Onset     Good Health  Mother      Hyperlipidemia  Father      high cholesterol       Good Health  Daughter      Good Health  Son      Good Health  Other    ,   Current Outpatient Prescriptions on File Prior  to Visit       Medication  Sig Dispense Refill     omeprazole (PRILOSEC) 40 mg Delayed-Release capsule Take 1 capsule by mouth once daily. 90 capsule 4     No current facility-administered medications on file prior to visit.    , No past medical history on file.,   Patient Active Problem List       Diagnosis  Date Noted     Adjustment disorder with anxious mood  01/11/2017     BPPV (benign paroxysmal positional vertigo)  11/15/2016     S/P shoulder surgery  10/26/2016     Bilateral        Gastroesophageal reflux disease without esophagitis  12/14/2015     VENEREAL WART       HYPERCHOLESTEROLEMIA       mild        ,   Past Surgical History:      Procedure  Laterality Date     HAND FINGER SURGERY      Tendon repair, right fifth finger, under anesthesia        OUTER EAR SURGERY      Left ear surgery for cartilage reconstruction        SHOULDER SURGERY Bilateral 2017     VASECTOMY       VASECTOMY      reversal scheduled for 12/06       WISDOM TEETH EXTRACTION      under anesthesia       and   Social History        Substance Use Topics          Smoking status:   Never Smoker      Smokeless tobacco:   Current User      Types:  Chew      Alcohol use   Yes      Comment: Occasional       OBJECTIVE:  /80  Pulse 60  Temp 96.3  F (35.7  C) (Tympanic)   Wt 81.4 kg (179 lb 6.4 oz) Comment: this is what he wieghed at home. He wanted that weight  BMI 26.11 kg/m2   EXAM:  Alert and cooperative, anxious white male, no distress. No CVA tenderness. No flank or abdominal tenderness. No suprapubic tenderness.  ASSESSMENT/Plan :      Meliton was seen today for prostate problem.    Diagnoses and all orders for this visit:    Nonspecific urethritis  -     doxycycline (VIBRAMYCIN) 100 mg capsule; Take 1 capsule by mouth 2 times daily for 14 days.    Acute bilateral thoracic back pain  -     URINALYSIS W REFLEX MICROSCOPIC IF POSITIVE; Future  -     URINALYSIS W REFLEX MICROSCOPIC IF POSITIVE  -     URINALYSIS MICROSCOPIC; Future  -      URINALYSIS MICROSCOPIC    Prostatitis, unspecified prostatitis type    Anxiety      I suspect that he probably has a nonspecific urethritis and possible a mild prostatitis.  Recommended fluids, empiric treatment with doxycycline twice a day for 14 days, Aleve 1 tablet twice daily with food, and scrotal support. If not improving, would then recommend referral to the urologist. Advised that the Aleve may cause some stomach irritation so he should be aware of that. He has EGD scheduled for Thursday and can certainly start this after that procedure is done.    Prince White MD

## 2018-02-12 NOTE — OR ANESTHESIA
Patient Information     Patient Name MRN Sex Meliton Stone [id number] Male 1975      OR Anesthesia by Vince Rogers CRNA at 2017  9:55 AM     Author:  Vince Rogers CRNA Service:  (none) Author Type:  NURS- Nurse Anesthetist     Filed:  2017  9:55 AM Date of Service:  2017  9:55 AM Status:  Signed     :  Vince Rogers CRNA (NURS- Nurse Anesthetist)                                                           ANESTHESIA ASSESSMENT    Date: 17 Time: 9:55 AM      Patient:  Meliton Finnegan    Procedure(s) (LRB):  ESOPHAGOGASTRODUODENOSCOPY (N/A)    Past Medical History:     Diagnosis  Date     Epigastric pain 2017       Past Surgical History:      Procedure  Laterality Date     HAND FINGER SURGERY      Tendon repair, right fifth finger, under anesthesia        OUTER EAR SURGERY      Left ear surgery for cartilage reconstruction        SHOULDER SURGERY Bilateral 2017     VASECTOMY       VASECTOMY      reversal scheduled for        WISDOM TEETH EXTRACTION      under anesthesia          Family History       Problem   Relation Age of Onset     Good Health  Mother      Hyperlipidemia  Father      high cholesterol       Good Health  Daughter      Good Health  Son      Good Health  Other        Patient Active Problem List     Diagnosis  Code     VENEREAL WART A63.0     HYPERCHOLESTEROLEMIA E78.00     Gastroesophageal reflux disease without esophagitis K21.9     BPPV (benign paroxysmal positional vertigo) H81.10     Adjustment disorder with anxious mood F43.22     S/P shoulder surgery Z98.890     Epigastric pain R10.13       Prescriptions Prior to Admission       Medication  Sig Dispense Refill     doxycycline (VIBRAMYCIN) 100 mg capsule Take 1 capsule by mouth 2 times daily for 14 days. 28 capsule 0     FLUoxetine (PROZAC) 20 mg capsule TK 1 C PO QAM  3     omeprazole (PRILOSEC) 40 mg Delayed-Release capsule Take 1 capsule by mouth once daily. 90  capsule 4       Allergies:No Known Allergies    Review of Systems:  GERD: Yes  Chest pain: No  Shortness of breath: No  Recent fever: No  Poor exercise tolerance: No  Bleeding tendency: No  Pregnant: No  Anesthesia Complications: None      History     Smoking Status       Never Smoker    Smokeless Tobacco       Current User      Types: Chew     Social History     Social History        Marital status:       Spouse name: N/A     Number of children:  N/A     Years of education:  N/A     Social History Main Topics          Smoking status:   Never Smoker      Smokeless tobacco:   Current User      Types:  Chew      Alcohol use   Yes      Comment: Occasional       Drug use:   No      Sexual activity:   Yes      Partners:  Female      Other Topics  Concern     None      Social History Narrative     Was ; works for [employer] as a . 3/16.    Children 2 daughters, Brooks age 7 and Ashlie age 4, Son Junior           Physical Examination:  /73  Pulse 62  Temp 97.4  F (36.3  C)  Resp 18  SpO2 99% There is no height or weight on file to calculate BMI. There is no height or weight on file to calculate BSA.  Dental Condition: Good     Mallampati Score (Airway): I  Cardiovascular: Normal  Pulmonary: Normal  Other: (not recorded)    Recent Labs in [facility]:    No results for input(s): SODIUM, POTASSIUM, CHLORIDE, CB0DEKKT, ANIONGAP, BUN, CREATININE, BUNCREARATIO, CALCIUM, GLUCOSE, GLUCOSEMETER, KETONES, MAGNESIUM, WBC, HGB, HCT, PLT, ABORH, RHTYPE, PREGURINE, BHCGQL, HCGBETAQUANT, INR in the last 72 hours.          Assessment/Plan:  ASA Class: II  Risk of dental injury discussed: Yes  NPO status confirmed: Yes  Anesthetic Plan: MAC  Risk/Benefit/Alt discussed: Yes  Questions answered: Yes  Emergency Case?: No  Labs/ECG/Radiology Reviewed?: Yes      H&P Reviewed.  Patient Examined.      Provider Electronic Signature:  Vince Rogers CRNA

## 2018-02-12 NOTE — PROCEDURES
Patient Information     Patient Name MRN Sex Meliton Stone [id number] Male 1975      Procedures by Tameka Olivo MD at 2017 11:12 AM     Author:  Tameka Olivo MD Service:  (none) Author Type:  Physician     Filed:  2017 11:21 AM Date of Service:  2017 11:12 AM Status:  Signed     :  Tameka Olivo MD (Physician)        Pre-procedure Diagnoses:    1. Heartburn [R12]    2. Hoarseness [R49.0]           Post-procedure Diagnoses:    1. Gastric erosion, acute [K25.3]    2. Gastric polyp [K31.7]    3. Gastritis determined by endoscopy [K29.70]           Procedures:    1. ESOPHAGOGASTRODUODENOSCOPY [[id number] (Custom)]               PROCEDURE NOTE    SURGEON: Tameka Olivo MD.    PRE-OP DIAGNOSIS:   Heartburn, hoarse voice      POST-OP DIAGNOSIS: gastritis with gastric erosion, gastric polyp    PROCEDURE PLANNED:   Diagnostic EGD, flexible        PROCEDURE PERFORMED:  EGD with biopsy, flexible    SPECIMEN:  Duodenum, antrum, gastric polyp, gastric erosion, GEJ, mid-esophagus    ANESTHESIA: See anesthesia record    ESTIMATED BLOOD LOSS: None    COMPLICATIONS:  None    INDICATION FOR THE PROCEDURE: The patient is a 42 y.o.male. The patient presents with heartburn and hoarseness. I explained to the patient the risks, benefits and alternatives to diagnostic EGD for evaluating his complaints. We specifically discussed the risks of bleeding, infection, perforation and the risks of sedation. The patient's questions were answered and the patient wished to proceed. Informed consent paperwork was completed.    PROCEDURE: The patient was taken to the endoscopy suite. Appropriate monitors were attached. The patient was placed in the left lateral decubitus position. Bite block was positioned.Timeout was performed confirming the patient's identity and procedure to be performed. After appropriate sedation was confirmed, the flexible endoscope was advanced into the oropharynx. The posterior oropharynx  appeared grossly normal. The scope was advanced into the proximal esophagus. The esophagus was insufflated with air. The scope was advanced under direct visualization. No acute abnormalities of the esophagus were noted. The scope was advanced into the stomach. The scope was advanced through the pylorus into the duodenal bulb. The bulb and distal duodenum appeared grossly normal. Biopsies were taken. The scope was withdrawn back into the stomach. Linear gastritis was noted in the antrum.  Antral biopsy was obtained and sent to pathology. Gastric polyps were noted and biopsied. The scope was retroflexed and the GE junction inspected. Gastric erosion was noted in the body of the stomach. Biopsy was obtained. The scope was returned to a neutral position and the stomach was decompressed. The scope was withdrawn to the GE junction and biopsy obtained. The mucosa of the esophagus was inspected while withdrawing the scope. No abnormalities were noted. A mid esophageal biopsy was obtained. The scope was withdrawn from the patient. The bite block was removed. The patient tolerated the procedure with no immediately apparent complication. The patient was taken to recovery in stable condition.    FOLLOW UP:  RECOMMENDATIONS will call with pathology results.    Tameka Olivo MD

## 2018-02-12 NOTE — H&P
Patient Information     Patient Name MRN Meliton Gurrola [id number] Male 1975      H&P by Tameka Olivo MD at 2017 10:32 AM     Author:  Tameka Olivo MD Service:  (none) Author Type:  Physician     Filed:  2017 10:34 AM Date of Service:  2017 10:32 AM Status:  Signed     :  Tameka Olivo MD (Physician)            CHIEF COMPLAINT / REASON FOR PROCEDURE:  heartburn    PERTINENT HISTORY   Patient complains of heartburn and hoarseness. Previous EGD none.     Past Medical History:     Diagnosis  Date     Epigastric pain 2017     Past Surgical History:      Procedure  Laterality Date     HAND FINGER SURGERY      Tendon repair, right fifth finger, under anesthesia        OUTER EAR SURGERY      Left ear surgery for cartilage reconstruction        SHOULDER SURGERY Bilateral 2017     VASECTOMY       VASECTOMY      reversal scheduled for        WISDOM TEETH EXTRACTION      under anesthesia        Other:  None  Bleeding tendencies:  No    Relevant Family History:  None    Relevant Social History:  None    A relevant review of systems was performed and was negative. See HPI.    ALLERGIES/SENSITIVITIES: No Known Allergies     CURRENT MEDICATIONS:    Current Facility-Administered Medications        Medication  Dose Route Frequency Last Rate     lactated Ringers infusion  25 mL/hr Intravenous continuous 25 mL/hr (17 0950)     lidocaine (1%) injection 0.1-1 mL  0.1-1 mL Intra-Dermal one time prn       sodium chloride 0.9% 5 mL syringe (NORMAL SALINE)  5 mL Intravenous Each Time PRN        Prior to Admission medications          Medication Sig Start Date End Date Taking? Last Dose Authorizing Provider   doxycycline (VIBRAMYCIN) 100 mg capsule Take 1 capsule by mouth 2 times daily for 14 days. 17 at 2200 Prince White MD   FLUoxetine (PROZAC) 20 mg capsule TK 1 C PO QAM 17   More than one month ago Reported, Patient   omeprazole (PRILOSEC) 40  mg Delayed-Release capsule Take 1 capsule by mouth once daily. 10/23/17   12/6/2017 at 2200 Mecca Martinez MD       PRE-SEDATION ASSESSMENT:    Lung Exam:  Normal  Heart Exam:  Normal    Comment(s):      IMPRESSION:  heartburn.    PLAN:  I discussed diagnostic EGD with the patient.    Tameka Olivo MD

## 2018-02-12 NOTE — OR ANESTHESIA
Patient Information     Patient Name MRN Sex     Meliton Finnegan [id number] Male 1975      OR Anesthesia by Vince Rogers CRNA at 2017 11:33 AM     Author:  Vince Rogers CRNA Service:  (none) Author Type:  NURS- Nurse Anesthetist     Filed:  2017 11:33 AM Date of Service:  2017 11:33 AM Status:  Signed     :  Vince Rogers CRNA (NURS- Nurse Anesthetist)            Anesthesia Post Operative Care Note    Name: Meliton Finnegan  MRN:   [id number]  :    1975       Procedure Done:  See Surgeon Note   Case Cancelled for Anesthetic Reason:  No      Anesthesia Technique    Anesthetic Type:  MAC       MAC Type:  NC     Oral Trauma:  No    Intraoperative Course   Hemodynamics:  Stable    Ventilation Normal:  Yes Lung Sounds:  Normal      PACU Course    Airway Status:  Extubated     Nondepolarizer Used:       Reversed: N/A   Hemodynamics:  Stable      Hydration: Euvolemic   Temperature:  36.1 - 38.3      Mental Status:  Awake, alert, follows commands   Pain Management:  Adequate   Regional Block:  No   Anesthesia Complications:  None      Vital Signs:  Temp: 98.1  F (36.7  C)  Pulse: 72  BP: 100/69  Resp: 14  SpO2: 97 %                       Active Lines:  Patient Lines/Drains/Airways Status    Active Line     Name: Placement date: Placement time: Site: Days:    PERIPHERAL VAD Right Forearm 22 17   0950   Forearm   less than 1                Intake & Output:       Labs:  No results for input(s): CR6USJIAAUY, UIB0URGWCMJI, PHARTERIAL, RGU6CRGNBRUJ, C3MOAYJWPUFA in the last 24 hours.    No results for input(s): MAGNESIUM in the last 24 hours.    No results for input(s): GLUCOSEMETER in the last 720 hours.        Vince Rogers CRNA ....................  2017   11:33 AM

## 2018-02-12 NOTE — PATIENT INSTRUCTIONS
Patient Information     Patient Name MRN Sex Meliton Stone [id number] Male 1975      Patient Instructions by Renate Acevedo NP at 2017  3:00 PM     Author:  Renate Acevedo NP Service:  (none) Author Type:  PHYS- Nurse Practitioner     Filed:  2017  4:50 PM Encounter Date:  2017 Status:  Signed     :  Renate Acevedo NP (PHYS- Nurse Practitioner)            Pyridium every 8 hours as needed for burning and urinary pain    Referral to urologist    Follow up if blood, fever, discharge, swelling, worsening pain, concerns, etc

## 2018-02-12 NOTE — TELEPHONE ENCOUNTER
"Patient Information     Patient Name MRN Meliton Gurrola [id number] Male 1975      Telephone Encounter by Sarah rAmstrong RN at 2017  2:59 PM     Author:  Sarah Armstrong RN Service:  (none) Author Type:  NURS- Registered Nurse     Filed:  2017  3:14 PM Encounter Date:  2017 Status:  Signed     :  Sarah Armstrong RN (NURS- Registered Nurse)            Patient called today stating he saw Dr. White recently for urethritis. He was prescribed doxycycline and finished the prescription 5 days ago. Patient states symptoms came back 3 days ago. Patient is requesting additional antibiotic. When questioned about symptoms, Patient seemed somewhat reluctant to talk about them. He says he is \"very uncomfortable\" and it \"hurts to pee\", rating the pain 3-4/10. Patient was notified that both the prescribing physician and his PCP are out of the clinic this afternoon and he has the option of going to [facility] to be seen. Patient verbalized agreement with this plan.    Sarah Armstrong RN .............. 2017  3:13 PM                "

## 2018-02-12 NOTE — OR POSTOP
Patient Information     Patient Name MRN Sex Meliton Stone [id number] Male 1975      OR PostOp by Lisseth Kelley RN at 2017 12:07 PM     Author:  Lisseth Kelley RN Service:  (none) Author Type:  NURS- Registered Nurse     Filed:  2017 12:13 PM Date of Service:  2017 12:07 PM Status:  Signed     :  Lisseth Kelley RN (NURS- Registered Nurse)            Discharge Note    Data:  Meliton Finnegan has been discharged home at 1207 via ambulatory accompanied by Registered Nurse.      Action:  Written discharge/follow-up instructions were provided to patient. Prescriptions : None.  Belongings sent with patient. Medications from home sent with patient/family: Not Applicable  Equipment none .     Response:  Patient verbalized understanding of discharge instructions, reason for discharge, and necessary follow-up appointments.     Lisseth Kelley RN

## 2018-02-13 NOTE — NURSING NOTE
Patient Information     Patient Name MRN Sex Meliton Stone [id number] Male 1975      Nursing Note by Staci Koroma at 1/15/2018  2:00 PM     Author:  Staci Koroma Service:  (none) Author Type:  (none)     Filed:  1/15/2018  2:17 PM Encounter Date:  1/15/2018 Status:  Signed     :  Staci Koroma            Here for pelvic and lower back and right side pain.    Post-Void Residual  Verbal order read back from Arnold Keane MD to perform a post-void residual bladder scan.  A post-void residual was measured by ultrasonic bladder scanner.  21 mL    Review of Systems:    Weight loss:    No     Recent fever/chills:  No   Night sweats:   No  Current skin rash:  No   Recent hair loss:  No  Heat intolerance:  No   Cold intolerance:  No  Chest pain:   No   Palpitations:   No  Shortness of breath:  No   Wheezing:   No  Constipation:    No   Diarrhea:   No   Nausea:   No   Vomiting:   No   Kidney/side pain:  Yes   Back pain:   Yes  Frequent headaches:  No   Dizziness:     No  Leg swelling:   No   Calf pain:    No        Parents, brothers or sisters with history of kidney cancer?   No  Parents, brothers or sisters with history of bladder cancer: No    Staci Koroma LPN  1/15/2018  2:08 PM

## 2018-02-13 NOTE — PROGRESS NOTES
Patient Information     Patient Name Meliton Llanos [id number] Male 1975      Progress Notes by Arnold Keane MD at 1/15/2018  2:00 PM     Author:  Arnold Keane MD Service:  (none) Author Type:  Physician     Filed:  1/15/2018  2:50 PM Encounter Date:  1/15/2018 Status:  Signed     :  Arnold Keane MD (Physician)            Type of Visit  NPV    Chief Complaint  urethral pain    HPI  Mr. Finnegan is a 42 y.o. male who presents with persistent pain symptoms.  Primarily he complains of pain in the urethra.  Started 2 months ago.  Antibiotics have improved the symptoms.  He has taken two courses of antibiotics.  He complains of right flank pain, bloating, pain in the penis/urethra, diarrhea.  He denies hematuria.  Urine testing, STI testing and imaging are all negative.  He has never had kidney stone in the past.      Past Medical History  He  has a past medical history of Epigastric pain (2017).  Patient Active Problem List     Diagnosis  Code     VENEREAL WART A63.0     HYPERCHOLESTEROLEMIA E78.00     Gastroesophageal reflux disease without esophagitis K21.9     BPPV (benign paroxysmal positional vertigo) H81.10     Adjustment disorder with anxious mood F43.22     S/P shoulder surgery Z98.890     Epigastric pain R10.13       Past Surgical History  He  has a past surgical history that includes vasectomy; outer ear surgery; wisdom teeth extraction; hand finger surgery; vasectomy; Shoulder surgery (Bilateral, 2017); and esophagogastroduodenoscopy (2017).    Medications  He has a current medication list which includes the following prescription(s): fluoxetine.    Allergies  No Known Allergies    Social History  He  reports that he has never smoked. He has quit using smokeless tobacco. His smokeless tobacco use included Chew. He reports that he drinks alcohol. He reports that he does not use illicit drugs.  No drug abuse.    Family History  Family History       Problem   Relation  Age of Onset     Good Health  Mother      Hyperlipidemia  Father      high cholesterol       Good Health  Daughter      Good Health  Son      Good Health  Other        Review of Systems  I personally reviewed the ROS with the patient.    Nursing Notes:   Staci Koroma  1/15/2018  2:08 PM  Unsigned  Here for pelvic and lower back and right side pain.    Post-Void Residual  Verbal order read back from Arnold Keane MD to perform a post-void residual bladder scan.  A post-void residual was measured by ultrasonic bladder scanner.  21 mL    Review of Systems:    Weight loss:    No     Recent fever/chills:  No   Night sweats:   No  Current skin rash:  No   Recent hair loss:  No  Heat intolerance:  No   Cold intolerance:  No  Chest pain:   No   Palpitations:   No  Shortness of breath:  No   Wheezing:   No  Constipation:    No   Diarrhea:   No   Nausea:   No   Vomiting:   No   Kidney/side pain:  Yes   Back pain:   Yes  Frequent headaches:  No   Dizziness:     No  Leg swelling:   No   Calf pain:    No        Parents, brothers or sisters with history of kidney cancer?   No  Parents, brothers or sisters with history of bladder cancer: No    Staci Koroma LPN  1/15/2018  2:08 PM          Physical Exam  Vitals:     01/15/18 1404   BP: 124/74   Cuff Site: Left Arm   Position: Sitting   Cuff Size: Adult Large   Resp: 14   Weight: 82.6 kg (182 lb)     Constitutional: No acute distress.  Alert and cooperative   Head: NCAT  Eyes: Conjunctivae normal  Cardiovascular: Regular rate.  Pulmonary/Chest: Respirations are even and non-labored bilaterally, no audible wheezing  Abdominal: Soft. No distension, tenderness, masses or guarding.   Neurological: A + O x 3.  Cranial Nerves II-XII grossly intact.  Extremities: NATASHA x 4, Warm. No clubbing.  No cyanosis.    Skin: Pink, warm and dry.  No visible rashes noted.  Psychiatric:  Normal mood and affect  Back:  No left CVA tenderness.  No right CVA tenderness.  Genitourinary:  Nonpalpable  bladder    Labs  CREATININE (mg/dL)    Date Value   11/01/2017 1.03       Recent Labs       12/22/17   1542   COLOR  Yellow   CLARITY  Clear   SPECGRAV  1.015   PHURINE  7.0   UROBILINOGEN  Normal   PROTEINUA  Negative       Recent Labs       12/22/17   1542   GLUCOSEUA  Negative   KETONESUA  Trace A   BLOODUA  Negative   NITRITE  Negative   LEUKOCYTE  Negative     Lab Results      Component  Value Date/Time    WBCUA 0-2 12/05/2017 11:09 AM    RBCUA None Seen 12/05/2017 11:09 AM    BACTERIAUA Rare 12/05/2017 11:09 AM    EPITHELIALUA Few 12/05/2017 11:09 AM     Post-Void Residual  A post-void residual was measured by ultrasonic bladder scanner.  21 mL    Imaging  CT Stone  11/7/2017  I personally reviewed and interpreted the images and report.  IMPRESSION:  No acute findings in the abdomen or pelvis. No renal calculi or hydronephrosis.    Assessment & Plan  Mr. Finnegan is a 42 y.o. male who presents with multiple complaints.  It sounds as if he has urethritis with possible pain over the right SI joint.  Given his negative urinalyses ×4, normal PVR and negative CT scan I reassured the patient that I do not believe he has urologic issues.  I wondered aloud with the patient whether he has an autoimmune spectrum type disease such as ankylosing spondylitis.

## 2018-02-13 NOTE — TELEPHONE ENCOUNTER
Patient Information     Patient Name MRN Meliton Gurrola [id number] Male 1975      Telephone Encounter by Sarah Bryan RN at 2017 10:08 AM     Author:  Sarah Bryan RN Service:  (none) Author Type:  NURS- Registered Nurse     Filed:  2017 10:23 AM Encounter Date:  2017 Status:  Signed     :  Sarah Bryan RN (NURS- Registered Nurse)            Request for refill of Omeprazole received.  Rx ordered by surgeon, Dr. Olivo, Ompeprazole, 21 pills only for 2 weeks with one daily and then every other day for two weeks. Per chart, patient was to wean off of proton pump inhibitor and start Carafate 1gm three time daily. See letter dated 17 from Dr. Olivo.    Refused as not appropriate to refill Omeprazole.   Unable to complete prescription refill per RN Medication Refill Policy.................... Sarah Bryan RN ....................  2017   10:21 AM

## 2018-05-03 ENCOUNTER — TELEPHONE (OUTPATIENT)
Dept: FAMILY MEDICINE | Facility: OTHER | Age: 43
End: 2018-05-03

## 2018-05-03 DIAGNOSIS — N34.1 NONSPECIFIC URETHRITIS: Primary | ICD-10-CM

## 2018-05-03 NOTE — TELEPHONE ENCOUNTER
Our urologist thought the symptoms are a likely rheumatological problem, and advised a rheum consult.  I placed the order.  Let him know.  Tolu Mccarty MD on 5/3/2018 at 10:15 AM

## 2018-05-03 NOTE — TELEPHONE ENCOUNTER
Pt is having the same symptoms, for about a month in a half, real gassy, abdomen is bloated, even with a small meal feels full. Lower back pain, real tired all the time.  Would like to get set up with a specialist, tired of all the symptoms  Kim Leroy LPN on 5/3/2018 at 9:24 AM

## 2018-05-04 NOTE — TELEPHONE ENCOUNTER
Come in and see me, or see if he had a specific specialist in mind.  Tolu Mccarty MD on 5/4/2018 at 2:41 PM

## 2018-05-04 NOTE — TELEPHONE ENCOUNTER
Patient notified of Dr Mccarty's response. He stated he is concerned about the stomach issues and bloating he has been having for a month. Wondering what to do about that.

## 2018-05-04 NOTE — TELEPHONE ENCOUNTER
Patient notified of Dr Mccarty's response. He asked to schedule an appointment so I transferred him to the appointment line.

## 2018-05-10 ENCOUNTER — OFFICE VISIT (OUTPATIENT)
Dept: FAMILY MEDICINE | Facility: OTHER | Age: 43
End: 2018-05-10
Attending: FAMILY MEDICINE
Payer: COMMERCIAL

## 2018-05-10 VITALS
WEIGHT: 193.4 LBS | SYSTOLIC BLOOD PRESSURE: 128 MMHG | TEMPERATURE: 96.3 F | HEIGHT: 69 IN | BODY MASS INDEX: 28.64 KG/M2 | DIASTOLIC BLOOD PRESSURE: 80 MMHG | HEART RATE: 70 BPM

## 2018-05-10 DIAGNOSIS — R10.84 ABDOMINAL PAIN, GENERALIZED: Primary | ICD-10-CM

## 2018-05-10 PROBLEM — S46.812A PARTIAL TEAR OF LEFT SUBSCAPULARIS TENDON, INITIAL ENCOUNTER: Status: ACTIVE | Noted: 2017-05-02

## 2018-05-10 PROCEDURE — 99214 OFFICE O/P EST MOD 30 MIN: CPT | Performed by: FAMILY MEDICINE

## 2018-05-10 RX ORDER — DOXYCYCLINE 100 MG/1
100 CAPSULE ORAL 2 TIMES DAILY
Qty: 28 CAPSULE | Refills: 1 | Status: SHIPPED | OUTPATIENT
Start: 2018-05-10 | End: 2018-06-22

## 2018-05-10 ASSESSMENT — PAIN SCALES - GENERAL: PAINLEVEL: SEVERE PAIN (7)

## 2018-05-10 NOTE — NURSING NOTE
Patient presents in the clinic with stomach pain and low back pain. Patient states the pain occurred 6 months ago, that went away that has now been causing him discomfort for the past 2 months. Patient also reports fatigue, and heart burn.   Renetta Arriaza LPN 5/10/2018 10:18 AM

## 2018-05-10 NOTE — MR AVS SNAPSHOT
"              After Visit Summary   5/10/2018    Meliton Finnegan    MRN: [id number]           Patient Information     Date Of Birth          1975        Visit Information        Provider Department      5/10/2018 10:00 AM Oli Steve MD [facility]        Today's Diagnoses     Abdominal pain, generalized    -  1       Follow-ups after your visit        Your next 10 appointments already scheduled     May 11, 2018  2:45 PM CDT   Office Visit with Tolu Mccarty MD   [facility]    [address]  [place] [id number] [phone]           Bring a current list of meds and any records pertaining to this visit. For Physicals, please bring immunization records and any forms needing to be filled out. Please arrive 10 minutes early to complete paperwork.              Who to contact     If you have questions or need follow up information about today's clinic visit or your schedule please contact [facility] directly at [phone].  Normal or non-critical lab and imaging results will be communicated to you by [employer], letter or phone within 4 business days after the clinic has received the results. If you do not hear from us within 7 days, please contact the clinic through mTraks or phone. If you have a critical or abnormal lab result, we will notify you by phone as soon as possible.  Submit refill requests through mTraks or call your pharmacy and they will forward the refill request to us. Please allow 3 business days for your refill to be completed.          Additional Information About Your Visit        [employer] Pulse Information     mTraks lets you send messages to your doctor, view your test results, renew your prescriptions, schedule appointments and more. To sign up, go to [email or web address] . Click on \"Log in\" on the left side of the screen, which will take you to the Welcome page. Then click " "on \"Sign up Now\" on the right side of the page.     You will be asked to enter the access code listed below, as well as some personal information. Please follow the directions to create your username and password.     Your access code is: [email or web address]  Expires: 2018 11:21 AM     Your access code will  in 90 days. If you need help or a new code, please call your [facility] clinic or [phone].        Care EveryWhere ID     This is your Care EveryWhere ID. This could be used by other organizations to access your [facility] medical records  [id number]        Your Vitals Were     Pulse Temperature Height BMI (Body Mass Index)          70 96.3  F (35.7  C) (Tympanic) 5' 9\" (1.753 m) 28.56 kg/m2         Blood Pressure from Last 3 Encounters:   05/10/18 128/80   01/15/18 124/74   17 116/80    Weight from Last 3 Encounters:   05/10/18 193 lb 6.4 oz (87.7 kg)   01/15/18 182 lb (82.6 kg)   17 188 lb 12.8 oz (85.6 kg)              Today, you had the following     No orders found for display         Today's Medication Changes          These changes are accurate as of 5/10/18 11:21 AM.  If you have any questions, ask your nurse or doctor.               Start taking these medicines.        Dose/Directions    doxycycline 100 MG capsule   Commonly known as:  VIBRAMYCIN   Used for:  Abdominal pain, generalized   Started by:  Oli Steve MD        Dose:  100 mg   Take 1 capsule (100 mg) by mouth 2 times daily   Quantity:  28 capsule   Refills:  1       omeprazole 20 MG CR capsule   Commonly known as:  priLOSEC   Used for:  Abdominal pain, generalized   Started by:  Oli Steve MD        Dose:  20 mg   Take 1 capsule (20 mg) by mouth daily   Quantity:  30 capsule   Refills:  1            Where to get your medicines      These medications were sent to [facility] [address] [place], [place] [address], [facility] [address]     Phone:  " [phone]     doxycycline 100 MG capsule    omeprazole 20 MG CR capsule                Primary Care Provider Office Phone # Fax #    Tolu Mccarty [phone] [address]   [facility] [place] [address]        Equal Access to Services     VINROCKY KIARA : Hadii esteban lindo afshinesmer Sojanelleali, waaxda luqadaha, qaybta kaalmada adeegyada, brittni talley premanegar corteskbscott milner. So [facility] [phone].    ATENCIÓN: Si habla español, tiene a garner disposición servicios gratuitos de asistencia lingüística. Llame al [phone].    We comply with applicable federal civil rights laws and [place] laws. We do not discriminate on the basis of race, color, national origin, age, disability, sex, sexual orientation, or gender identity.            Thank you!     Thank you for choosing [facility] AND [facility]  for your care. Our goal is always to provide you with excellent care. Hearing back from our patients is one way we can continue to improve our services. Please take a few minutes to complete the written survey that you may receive in the mail after your visit with us. Thank you!             Your Updated Medication List - Protect others around you: Learn how to safely use, store and throw away your medicines at www.disposemymeds.org.          This list is accurate as of 5/10/18 11:21 AM.  Always use your most recent med list.                   Brand Name Dispense Instructions for use Diagnosis    doxycycline 100 MG capsule    VIBRAMYCIN    28 capsule    Take 1 capsule (100 mg) by mouth 2 times daily    Abdominal pain, generalized       omeprazole 20 MG CR capsule    priLOSEC    30 capsule    Take 1 capsule (20 mg) by mouth daily    Abdominal pain, generalized

## 2018-05-10 NOTE — PROGRESS NOTES
"  SUBJECTIVE:   Meliton Finnegan is a 43 year old male who presents to clinic today for the following health issues:  Has had sx for 2 months of over active bowels w lots of rumbling and some loose stools and some ache into R flank. Afeb w/o chills, sweats. He had \"similar\" episode last Falll which turned out to be NS urethritis and antibiotics cleared that sx. He saw Dr Keane as well as primary care. CT was normal. He has a decent appetite . He does have reflux sx and did get help from PPI but has stopped those and sx have recurred. He did even have UGI endoscopy.             Problem list and histories reviewed & adjusted, as indicated.  Additional history: as documented        Reviewed and updated as needed this visit by clinical staff  Tobacco  Allergies  Meds  Med Hx  Surg Hx  Fam Hx  Soc Hx      Reviewed and updated as needed this visit by Provider             OBJECTIVE:     /80 (BP Location: Right arm, Patient Position: Sitting, Cuff Size: Adult Regular)  Pulse 70  Temp 96.3  F (35.7  C) (Tympanic)  Ht 5' 9\" (1.753 m)  Wt 193 lb 6.4 oz (87.7 kg)  BMI 28.56 kg/m2  Body mass index is 28.56 kg/(m^2).  GENERAL: healthy, alert and no distress  NECK: no adenopathy, no asymmetry, masses, or scars and thyroid normal to palpation  RESP: lungs clear to auscultation - no rales, rhonchi or wheezes  CV: regular rate and rhythm, normal S1 S2, no S3 or S4, no murmur, click or rub, no peripheral edema and peripheral pulses strong  ABDOMEN: soft, nontender, no hepatosplenomegaly, no masses and bowel sounds normal  MS: no gross musculoskeletal defects noted, no edema      ASSESSMENT/PLAN:       1. Abdominal pain, generalized  I think he has recurrent NSU and GERD- will treat w doxy and omeprazole w potential concerns long term w PPI discussed - see Dr Mccarty if sx persist or he wants further evaluation       See Patient Instructions    MYRTLE OSORIO MD  [facility]  "

## 2018-05-22 ENCOUNTER — TELEPHONE (OUTPATIENT)
Dept: FAMILY MEDICINE | Facility: OTHER | Age: 43
End: 2018-05-22

## 2018-05-22 NOTE — TELEPHONE ENCOUNTER
He actually does not know what he needs he is just not feeling well and had seen Oli Steve MD but still not doing better.  Soonest he could be seen was next week.   After speaking with Tolu Mccarty MD nurse we got an apt for patient 5/23. He will be seen then.   Tisha Mcclure LPN........................5/22/2018  9:19 AM

## 2018-05-23 ENCOUNTER — OFFICE VISIT (OUTPATIENT)
Dept: FAMILY MEDICINE | Facility: OTHER | Age: 43
End: 2018-05-23
Attending: FAMILY MEDICINE
Payer: COMMERCIAL

## 2018-05-23 VITALS
RESPIRATION RATE: 18 BRPM | BODY MASS INDEX: 27.62 KG/M2 | WEIGHT: 187 LBS | SYSTOLIC BLOOD PRESSURE: 124 MMHG | DIASTOLIC BLOOD PRESSURE: 64 MMHG

## 2018-05-23 DIAGNOSIS — R10.9 CHRONIC ABDOMINAL PAIN: Primary | ICD-10-CM

## 2018-05-23 DIAGNOSIS — G89.29 CHRONIC ABDOMINAL PAIN: Primary | ICD-10-CM

## 2018-05-23 DIAGNOSIS — M25.50 PAIN IN JOINT, MULTIPLE SITES: ICD-10-CM

## 2018-05-23 LAB
ALBUMIN SERPL-MCNC: 4.7 G/DL (ref 3.5–5.7)
ALP SERPL-CCNC: 40 U/L (ref 34–104)
ALT SERPL W P-5'-P-CCNC: 17 U/L (ref 7–52)
ANION GAP SERPL CALCULATED.3IONS-SCNC: 7 MMOL/L (ref 3–14)
AST SERPL W P-5'-P-CCNC: 18 U/L (ref 13–39)
BASOPHILS # BLD AUTO: 0 10E9/L (ref 0–0.2)
BASOPHILS NFR BLD AUTO: 0.5 %
BILIRUB SERPL-MCNC: 0.9 MG/DL (ref 0.3–1)
BUN SERPL-MCNC: 16 MG/DL (ref 7–25)
CALCIUM SERPL-MCNC: 9.4 MG/DL (ref 8.6–10.3)
CHLORIDE SERPL-SCNC: 104 MMOL/L (ref 98–107)
CO2 SERPL-SCNC: 27 MMOL/L (ref 21–31)
CREAT SERPL-MCNC: 1.08 MG/DL (ref 0.7–1.3)
CRP SERPL-MCNC: 0 MG/L
DIFFERENTIAL METHOD BLD: ABNORMAL
EOSINOPHIL # BLD AUTO: 0.1 10E9/L (ref 0–0.7)
EOSINOPHIL NFR BLD AUTO: 2.1 %
ERYTHROCYTE [DISTWIDTH] IN BLOOD BY AUTOMATED COUNT: 12 % (ref 10–15)
GFR SERPL CREATININE-BSD FRML MDRD: 75 ML/MIN/1.7M2
GLUCOSE SERPL-MCNC: 90 MG/DL (ref 70–105)
HCT VFR BLD AUTO: 46.3 % (ref 40–53)
HGB BLD-MCNC: 16.4 G/DL (ref 13.3–17.7)
IMM GRANULOCYTES # BLD: 0 10E9/L (ref 0–0.4)
IMM GRANULOCYTES NFR BLD: 0.5 %
LYMPHOCYTES # BLD AUTO: 1.3 10E9/L (ref 0.8–5.3)
LYMPHOCYTES NFR BLD AUTO: 29.8 %
MCH RBC QN AUTO: 31.3 PG (ref 26.5–33)
MCHC RBC AUTO-ENTMCNC: 35.4 G/DL (ref 31.5–36.5)
MCV RBC AUTO: 88 FL (ref 78–100)
MONOCYTES # BLD AUTO: 0.3 10E9/L (ref 0–1.3)
MONOCYTES NFR BLD AUTO: 7.2 %
NEUTROPHILS # BLD AUTO: 2.6 10E9/L (ref 1.6–8.3)
NEUTROPHILS NFR BLD AUTO: 59.9 %
PLATELET # BLD AUTO: 143 10E9/L (ref 150–450)
POTASSIUM SERPL-SCNC: 4.3 MMOL/L (ref 3.5–5.1)
PROT SERPL-MCNC: 6.9 G/DL (ref 6.4–8.9)
RBC # BLD AUTO: 5.24 10E12/L (ref 4.4–5.9)
SODIUM SERPL-SCNC: 138 MMOL/L (ref 134–144)
TSH SERPL DL<=0.05 MIU/L-ACNC: 1.72 IU/ML (ref 0.34–5.6)
WBC # BLD AUTO: 4.3 10E9/L (ref 4–11)

## 2018-05-23 PROCEDURE — 99214 OFFICE O/P EST MOD 30 MIN: CPT | Performed by: FAMILY MEDICINE

## 2018-05-23 PROCEDURE — 86200 CCP ANTIBODY: CPT | Performed by: FAMILY MEDICINE

## 2018-05-23 PROCEDURE — 83516 IMMUNOASSAY NONANTIBODY: CPT | Mod: 91 | Performed by: FAMILY MEDICINE

## 2018-05-23 PROCEDURE — 85025 COMPLETE CBC W/AUTO DIFF WBC: CPT | Performed by: FAMILY MEDICINE

## 2018-05-23 PROCEDURE — 84443 ASSAY THYROID STIM HORMONE: CPT | Performed by: FAMILY MEDICINE

## 2018-05-23 PROCEDURE — 36415 COLL VENOUS BLD VENIPUNCTURE: CPT | Performed by: FAMILY MEDICINE

## 2018-05-23 PROCEDURE — 86618 LYME DISEASE ANTIBODY: CPT | Performed by: FAMILY MEDICINE

## 2018-05-23 PROCEDURE — 86140 C-REACTIVE PROTEIN: CPT | Performed by: FAMILY MEDICINE

## 2018-05-23 PROCEDURE — 80053 COMPREHEN METABOLIC PANEL: CPT | Performed by: FAMILY MEDICINE

## 2018-05-23 PROCEDURE — 83516 IMMUNOASSAY NONANTIBODY: CPT | Performed by: FAMILY MEDICINE

## 2018-05-23 PROCEDURE — 83825 ASSAY OF MERCURY: CPT | Performed by: FAMILY MEDICINE

## 2018-05-23 ASSESSMENT — ENCOUNTER SYMPTOMS
ABDOMINAL PAIN: 1
SHORTNESS OF BREATH: 0
CONSTIPATION: 0
FATIGUE: 1
DIARRHEA: 1
UNEXPECTED WEIGHT CHANGE: 0
HEARTBURN: 1
ABDOMINAL DISTENTION: 1
SLEEP DISTURBANCE: 0
ARTHRALGIAS: 1

## 2018-05-23 ASSESSMENT — PAIN SCALES - GENERAL: PAINLEVEL: SEVERE PAIN (6)

## 2018-05-23 NOTE — LETTER
May 25, 2018      Meliton Finnegan  [address] [facility] [address]        Dear Meliton,     The rheumatoid arthritis labs are normal.    Results for orders placed or performed in visit on 05/23/18   Tissue transglutaminase michelle IgA and IgG   Result Value Ref Range    Tissue Transglutaminase Antibody IgA <1 <7 U/mL    Tissue Transglutaminase Michelle IgG 1 <7 U/mL   TSH   Result Value Ref Range    Thyrotropin 1.72 0.34 - 5.60 IU/mL   Comprehensive metabolic panel   Result Value Ref Range    Sodium 138 134 - 144 mmol/L    Potassium 4.3 3.5 - 5.1 mmol/L    Chloride 104 98 - 107 mmol/L    Carbon Dioxide 27 21 - 31 mmol/L    Anion Gap 7 3 - 14 mmol/L    Glucose 90 70 - 105 mg/dL    Urea Nitrogen 16 7 - 25 mg/dL    Creatinine 1.08 0.70 - 1.30 mg/dL    GFR Estimate 75 >60 mL/min/1.7m2    GFR Estimate If Black >90 >60 mL/min/1.7m2    Calcium 9.4 8.6 - 10.3 mg/dL    Bilirubin Total 0.9 0.3 - 1.0 mg/dL    Albumin 4.7 3.5 - 5.7 g/dL    Protein Total 6.9 6.4 - 8.9 g/dL    Alkaline Phosphatase 40 34 - 104 U/L    ALT 17 7 - 52 U/L    AST 18 13 - 39 U/L   CBC and Differential   Result Value Ref Range    WBC 4.3 4.0 - 11.0 10e9/L    RBC Count 5.24 4.4 - 5.9 10e12/L    Hemoglobin 16.4 13.3 - 17.7 g/dL    Hematocrit 46.3 40.0 - 53.0 %    MCV 88 78 - 100 fl    MCH 31.3 26.5 - 33.0 pg    MCHC 35.4 31.5 - 36.5 g/dL    RDW 12.0 10.0 - 15.0 %    Platelet Count 143 (L) 150 - 450 10e9/L    Diff Method Automated Method     % Neutrophils 59.9 %    % Lymphocytes 29.8 %    % Monocytes 7.2 %    % Eosinophils 2.1 %    % Basophils 0.5 %    % Immature Granulocytes 0.5 %    Absolute Neutrophil 2.6 1.6 - 8.3 10e9/L    Absolute Lymphocytes 1.3 0.8 - 5.3 10e9/L    Absolute Monocytes 0.3 0.0 - 1.3 10e9/L    Absolute Eosinophils 0.1 0.0 - 0.7 10e9/L    Absolute Basophils 0.0 0.0 - 0.2 10e9/L    Abs Immature Granulocytes 0.0 0 - 0.4 10e9/L   CRP inflammation   Result Value Ref Range    CRP Inflammation 0.0 <0.5 mg/L   Lyme Disease Michelle with  reflex to WB Serum   Result Value Ref Range    Lyme Disease Antibodies Serum 0.06 0.00 - 0.89   Cyclic Citrullinated Peptide Antibody IgG   Result Value Ref Range    Cyclic Citrullinated Peptide Antibody, IgG 1 <7 U/mL           Sincerely,        Tolu Mccarty MD

## 2018-05-23 NOTE — LETTER
May 29, 2018      Meliton Finnegan  [address] [facility] [address]        Dear Meliton,     Mercury is also normal.    Results for orders placed or performed in visit on 05/23/18   Tissue transglutaminase michelle IgA and IgG   Result Value Ref Range    Tissue Transglutaminase Antibody IgA <1 <7 U/mL    Tissue Transglutaminase Michelle IgG 1 <7 U/mL   TSH   Result Value Ref Range    Thyrotropin 1.72 0.34 - 5.60 IU/mL   Comprehensive metabolic panel   Result Value Ref Range    Sodium 138 134 - 144 mmol/L    Potassium 4.3 3.5 - 5.1 mmol/L    Chloride 104 98 - 107 mmol/L    Carbon Dioxide 27 21 - 31 mmol/L    Anion Gap 7 3 - 14 mmol/L    Glucose 90 70 - 105 mg/dL    Urea Nitrogen 16 7 - 25 mg/dL    Creatinine 1.08 0.70 - 1.30 mg/dL    GFR Estimate 75 >60 mL/min/1.7m2    GFR Estimate If Black >90 >60 mL/min/1.7m2    Calcium 9.4 8.6 - 10.3 mg/dL    Bilirubin Total 0.9 0.3 - 1.0 mg/dL    Albumin 4.7 3.5 - 5.7 g/dL    Protein Total 6.9 6.4 - 8.9 g/dL    Alkaline Phosphatase 40 34 - 104 U/L    ALT 17 7 - 52 U/L    AST 18 13 - 39 U/L   CBC and Differential   Result Value Ref Range    WBC 4.3 4.0 - 11.0 10e9/L    RBC Count 5.24 4.4 - 5.9 10e12/L    Hemoglobin 16.4 13.3 - 17.7 g/dL    Hematocrit 46.3 40.0 - 53.0 %    MCV 88 78 - 100 fl    MCH 31.3 26.5 - 33.0 pg    MCHC 35.4 31.5 - 36.5 g/dL    RDW 12.0 10.0 - 15.0 %    Platelet Count 143 (L) 150 - 450 10e9/L    Diff Method Automated Method     % Neutrophils 59.9 %    % Lymphocytes 29.8 %    % Monocytes 7.2 %    % Eosinophils 2.1 %    % Basophils 0.5 %    % Immature Granulocytes 0.5 %    Absolute Neutrophil 2.6 1.6 - 8.3 10e9/L    Absolute Lymphocytes 1.3 0.8 - 5.3 10e9/L    Absolute Monocytes 0.3 0.0 - 1.3 10e9/L    Absolute Eosinophils 0.1 0.0 - 0.7 10e9/L    Absolute Basophils 0.0 0.0 - 0.2 10e9/L    Abs Immature Granulocytes 0.0 0 - 0.4 10e9/L   CRP inflammation   Result Value Ref Range    CRP Inflammation 0.0 <0.5 mg/L   Lyme Disease Michelle with reflex to WB Serum    Result Value Ref Range    Lyme Disease Antibodies Serum 0.06 0.00 - 0.89   Cyclic Citrullinated Peptide Antibody IgG   Result Value Ref Range    Cyclic Citrullinated Peptide Antibody, IgG 1 <7 U/mL   Mercury, blood   Result Value Ref Range    Mercury 5 0 - 10 ug/L           Sincerely,        Tolu Mccarty MD

## 2018-05-23 NOTE — MR AVS SNAPSHOT
After Visit Summary   5/23/2018    Meliton Finnegan    MRN: [id number]           Patient Information     Date Of Birth          1975        Visit Information        Provider Department      5/23/2018 12:00 PM Tolu Mccarty MD [facility]        Today's Diagnoses     Chronic abdominal pain    -  1    Pain in joint, multiple sites           Follow-ups after your visit        Additional Services     GASTROENTEROLOGY ADULT REF CONSULT ONLY       Preferred Location: [facility] [place] Gastroenterology first, then [facility], [facility] or [place].    Please be aware that coverage of these services is subject to the terms and limitations of your health insurance plan.  Call member services at your health plan with any benefit or coverage questions.  Any procedures must be performed at a [facility] facility OR coordinated by your clinic's referral office.    Please bring the following with you to your appointment:    (1) Any X-Rays, CTs or MRIs which have been performed.  Contact the facility where they were done to arrange for  prior to your scheduled appointment.    (2) List of current medications   (3) This referral request   (4) Any documents/labs given to you for this referral                  Your next 10 appointments already scheduled     May 29, 2018 10:15 AM CDT   Office Visit with Tolu [facility]ccarty [facility]   [facility]    [address]  [place] [address] [phone]           Bring a current list of meds and any records pertaining to this visit. For Physicals, please bring immunization records and any forms needing to be filled out. Please arrive 10 minutes early to complete paperwork.              Who to contact     If you have questions or need follow up information about today's clinic visit or your schedule please contact [facility] directly at [phone].  Normal or  "non-critical lab and imaging results will be communicated to you by MyChart, letter or phone within 4 business days after the clinic has received the results. If you do not hear from us within 7 days, please contact the clinic through [employer] or phone. If you have a critical or abnormal lab result, we will notify you by phone as soon as possible.  Submit refill requests through [employer] or call your pharmacy and they will forward the refill request to us. Please allow 3 business days for your refill to be completed.          Additional Information About Your Visit        [employer] Information     [employer] lets you send messages to your doctor, view your test results, renew your prescriptions, schedule appointments and more. To sign up, go to [email or web address] . Click on \"Log in\" on the left side of the screen, which will take you to the Welcome page. Then click on \"Sign up Now\" on the right side of the page.     You will be asked to enter the access code listed below, as well as some personal information. Please follow the directions to create your username and password.     Your access code is: [email or web address]  Expires: 2018 11:21 AM     Your access code will  in 90 days. If you need help or a new code, please call your [facility] clinic or [phone].        Care EveryWhere ID     This is your Care EveryWhere ID. This could be used by other organizations to access your [facility] medical records  [id number]        Your Vitals Were     Respirations BMI (Body Mass Index)                18 27.62 kg/m2           Blood Pressure from Last 3 Encounters:   18 124/64   05/10/18 128/80   01/15/18 124/74    Weight from Last 3 Encounters:   18 187 lb (84.8 kg)   05/10/18 193 lb 6.4 oz (87.7 kg)   01/15/18 182 lb (82.6 kg)              We Performed the Following     CBC and Differential     Comprehensive metabolic panel     CRP inflammation     Cyclic Citrullinated Peptide Antibody IgG     GASTROENTEROLOGY " ADULT REF CONSULT ONLY     Lyme Disease Michelle with reflex to WB Serum     Mercury, blood     Tissue transglutaminase michelle IgA and IgG     TSH        Primary Care Provider Office Phone # Fax #    Tolu Mccarty [phone] [address]  [place] [address]        Equal Access to Services     ROCKY CRAIG : Hadii aad ku hadasho Soomaali, waaxda luqadaha, qaybta kaalmada adeegyada, waxgabriella milner. So [facility] [phone].    ATENCIÓN: Si habla español, tiene a garner disposición servicios gratuitos de asistencia lingüística. Llame al [phone].    We comply with applicable federal civil rights laws and [place] laws. We do not discriminate on the basis of race, color, national origin, age, disability, sex, sexual orientation, or gender identity.            Thank you!     Thank you for choosing [facility] AND [facility]  for your care. Our goal is always to provide you with excellent care. Hearing back from our patients is one way we can continue to improve our services. Please take a few minutes to complete the written survey that you may receive in the mail after your visit with us. Thank you!             Your Updated Medication List - Protect others around you: Learn how to safely use, store and throw away your medicines at www.disposemymeds.org.          This list is accurate as of 5/23/18 12:43 PM.  Always use your most recent med list.                   Brand Name Dispense Instructions for use Diagnosis    doxycycline 100 MG capsule    VIBRAMYCIN    28 capsule    Take 1 capsule (100 mg) by mouth 2 times daily    Abdominal pain, generalized       omeprazole 20 MG CR capsule    priLOSEC    30 capsule    Take 1 capsule (20 mg) by mouth daily    Abdominal pain, generalized

## 2018-05-23 NOTE — LETTER
May 24, 2018      Meliton Finnegan  [address] [facility] [address]        Dear Meliton,     The Lyme test is normal.    Results for orders placed or performed in visit on 05/23/18   TSH   Result Value Ref Range    Thyrotropin 1.72 0.34 - 5.60 IU/mL   Comprehensive metabolic panel   Result Value Ref Range    Sodium 138 134 - 144 mmol/L    Potassium 4.3 3.5 - 5.1 mmol/L    Chloride 104 98 - 107 mmol/L    Carbon Dioxide 27 21 - 31 mmol/L    Anion Gap 7 3 - 14 mmol/L    Glucose 90 70 - 105 mg/dL    Urea Nitrogen 16 7 - 25 mg/dL    Creatinine 1.08 0.70 - 1.30 mg/dL    GFR Estimate 75 >60 mL/min/1.7m2    GFR Estimate If Black >90 >60 mL/min/1.7m2    Calcium 9.4 8.6 - 10.3 mg/dL    Bilirubin Total 0.9 0.3 - 1.0 mg/dL    Albumin 4.7 3.5 - 5.7 g/dL    Protein Total 6.9 6.4 - 8.9 g/dL    Alkaline Phosphatase 40 34 - 104 U/L    ALT 17 7 - 52 U/L    AST 18 13 - 39 U/L   CBC and Differential   Result Value Ref Range    WBC 4.3 4.0 - 11.0 10e9/L    RBC Count 5.24 4.4 - 5.9 10e12/L    Hemoglobin 16.4 13.3 - 17.7 g/dL    Hematocrit 46.3 40.0 - 53.0 %    MCV 88 78 - 100 fl    MCH 31.3 26.5 - 33.0 pg    MCHC 35.4 31.5 - 36.5 g/dL    RDW 12.0 10.0 - 15.0 %    Platelet Count 143 (L) 150 - 450 10e9/L    Diff Method Automated Method     % Neutrophils 59.9 %    % Lymphocytes 29.8 %    % Monocytes 7.2 %    % Eosinophils 2.1 %    % Basophils 0.5 %    % Immature Granulocytes 0.5 %    Absolute Neutrophil 2.6 1.6 - 8.3 10e9/L    Absolute Lymphocytes 1.3 0.8 - 5.3 10e9/L    Absolute Monocytes 0.3 0.0 - 1.3 10e9/L    Absolute Eosinophils 0.1 0.0 - 0.7 10e9/L    Absolute Basophils 0.0 0.0 - 0.2 10e9/L    Abs Immature Granulocytes 0.0 0 - 0.4 10e9/L   CRP inflammation   Result Value Ref Range    CRP Inflammation 0.0 <0.5 mg/L   Lyme Disease Violeta with reflex to WB Serum   Result Value Ref Range    Lyme Disease Antibodies Serum 0.06 0.00 - 0.89           Sincerely,        Tolu Mccarty MD

## 2018-05-23 NOTE — PROGRESS NOTES
"  SUBJECTIVE:   Meliton Finnegan is a 43 year old male who presents to clinic today for the following health issues:    HPI    Has had significant gi issues now for several months.  Last fall he had increased cramping and gas.  Eventually got urological symptoms, with discharge.  Was seen by urology, with a negative work up including negative STD testing.  Was given antibiotics.  Urologist told him it was urethritis, but not urological.  Pt says the antibiotics cleared this up.  The antibiotics also subsided the GI symptoms as well.  By March of this year, the GI symptoms started back.  Occasional diarrhea, more gas.  This got progressive worse until 3 weeks ago he was seen here by Dr. Steve.  He says he is emotionally \"taxed\" by all of this, lots of back pain.  Pt is worried he has diverticulitis.  Is worried he has hypothyroidism as well.  Feels \"constantly bloated\".  He feels the inflammation triggers lumbar pains.  Diarrhea comes once a day up to every 3 days.  Weight is the same.  He has stopped dairy, nuts.  Did not help.  Has some tingling in both hands, and mildly elevated lipids on recent labs at work- so worried about the thyroid.  Ibuprofen 600 milligram seems to help the GI symptoms.  Probiotics for 2 months, no help.  Very little EtOH now.    He tried stopping the PPI due to the GI upset.  With stopping it, symptoms seemed to get worse.  Resumed the PPI a few weeks ago when he got back on antibiotics.      CMP and cbc were normal in November.  CT scan of abdomen 11/7/17, was normal.    He has been researching online, and fears we will not be able to pinpoint the diagnosis.  Worried about Crohn's and IBS as well as diverticulitis.  No fevers.  No blood in diarrhea.     Very tired, with pains in all his joints.  Wrists mostly.     Patient Active Problem List    Diagnosis Date Noted     Hypercholesterolemia 01/30/2018     Priority: Medium     Overview:   mild       Venereal wart 01/30/2018     Priority: " Medium     Epigastric pain 12/06/2017     Priority: Medium     Partial tear of left subscapularis tendon, initial encounter 05/02/2017     Priority: Medium     Adjustment disorder with anxious mood 01/11/2017     Priority: Medium     BPPV (benign paroxysmal positional vertigo) 11/15/2016     Priority: Medium     S/P shoulder surgery 10/26/2016     Priority: Medium     Overview:   Bilateral       Subluxation of tendon of long head of biceps 05/17/2016     Priority: Medium     Labral tear of shoulder, right, initial encounter 05/17/2016     Priority: Medium     Incomplete tear of right rotator cuff 05/17/2016     Priority: Medium     Acromioclavicular joint arthritis 05/17/2016     Priority: Medium     Gastroesophageal reflux disease without esophagitis 12/14/2015     Priority: Medium     Past Surgical History:   Procedure Laterality Date     ESOPHAGOSCOPY, GASTROSCOPY, DUODENOSCOPY (EGD), COMBINED      12/7/2017     EXTRACTION(S) DENTAL      under anesthesia     FINGER SURGERY      Tendon repair, right fifth finger, under anesthesia     OTHER SURGICAL HISTORY      63004,OUTER EAR SURGERY,Left ear surgery for cartilage reconstruction     OTHER SURGICAL HISTORY      2017,[id number],SHOULDER SURGERY,Bilateral     VASECTOMY      No Comments Provided     VASECTOMY      reversal scheduled for 12/06     Social History   Substance Use Topics     Smoking status: Never Smoker     Smokeless tobacco: Former User     Types: Chew     Alcohol use Yes      Comment: Alcoholic Drinks/day: Occasional     Current Outpatient Prescriptions   Medication Sig Dispense Refill     doxycycline (VIBRAMYCIN) 100 MG capsule Take 1 capsule (100 mg) by mouth 2 times daily 28 capsule 1     omeprazole (PRILOSEC) 20 MG CR capsule Take 1 capsule (20 mg) by mouth daily 30 capsule 1       Review of Systems   Constitutional: Positive for fatigue. Negative for unexpected weight change.   Respiratory: Negative for shortness of breath.    Cardiovascular: Negative  for chest pain.   Gastrointestinal: Positive for abdominal distention, abdominal pain, diarrhea and heartburn. Negative for constipation.   Musculoskeletal: Positive for arthralgias.   Psychiatric/Behavioral: Negative for sleep disturbance.        OBJECTIVE:     /64 (BP Location: Right arm, Patient Position: Sitting, Cuff Size: Adult Regular)  Resp 18  Wt 187 lb (84.8 kg)  BMI 27.62 kg/m2  Body mass index is 27.62 kg/(m^2).  Physical Exam   Constitutional: He is oriented to person, place, and time. He appears well-developed. No distress.   Cardiovascular: Normal rate and regular rhythm.  Exam reveals no gallop and no friction rub.    No murmur heard.  Pulmonary/Chest: Effort normal and breath sounds normal. No respiratory distress. He has no wheezes. He has no rales.   Abdominal: Soft. He exhibits no distension. There is no tenderness. There is no rebound and no guarding.   Neurological: He is alert and oriented to person, place, and time. No cranial nerve deficit.   Skin: Skin is warm. No rash noted. He is not diaphoretic. No erythema.   Psychiatric: He has a normal mood and affect. His behavior is normal. Thought content normal.       Diagnostic Test Results:  Results for orders placed or performed in visit on 05/23/18   TSH   Result Value Ref Range    Thyrotropin 1.72 0.34 - 5.60 IU/mL   Comprehensive metabolic panel   Result Value Ref Range    Sodium 138 134 - 144 mmol/L    Potassium 4.3 3.5 - 5.1 mmol/L    Chloride 104 98 - 107 mmol/L    Carbon Dioxide 27 21 - 31 mmol/L    Anion Gap 7 3 - 14 mmol/L    Glucose 90 70 - 105 mg/dL    Urea Nitrogen 16 7 - 25 mg/dL    Creatinine 1.08 0.70 - 1.30 mg/dL    GFR Estimate 75 >60 mL/min/1.7m2    GFR Estimate If Black >90 >60 mL/min/1.7m2    Calcium 9.4 8.6 - 10.3 mg/dL    Bilirubin Total 0.9 0.3 - 1.0 mg/dL    Albumin 4.7 3.5 - 5.7 g/dL    Protein Total 6.9 6.4 - 8.9 g/dL    Alkaline Phosphatase 40 34 - 104 U/L    ALT 17 7 - 52 U/L    AST 18 13 - 39 U/L   CBC and  Differential   Result Value Ref Range    WBC 4.3 4.0 - 11.0 10e9/L    RBC Count 5.24 4.4 - 5.9 10e12/L    Hemoglobin 16.4 13.3 - 17.7 g/dL    Hematocrit 46.3 40.0 - 53.0 %    MCV 88 78 - 100 fl    MCH 31.3 26.5 - 33.0 pg    MCHC 35.4 31.5 - 36.5 g/dL    RDW 12.0 10.0 - 15.0 %    Platelet Count 143 (L) 150 - 450 10e9/L    Diff Method Automated Method     % Neutrophils 59.9 %    % Lymphocytes 29.8 %    % Monocytes 7.2 %    % Eosinophils 2.1 %    % Basophils 0.5 %    % Immature Granulocytes 0.5 %    Absolute Neutrophil 2.6 1.6 - 8.3 10e9/L    Absolute Lymphocytes 1.3 0.8 - 5.3 10e9/L    Absolute Monocytes 0.3 0.0 - 1.3 10e9/L    Absolute Eosinophils 0.1 0.0 - 0.7 10e9/L    Absolute Basophils 0.0 0.0 - 0.2 10e9/L    Abs Immature Granulocytes 0.0 0 - 0.4 10e9/L   CRP inflammation   Result Value Ref Range    CRP Inflammation 0.0 <0.5 mg/L   Lyme Disease Violeta with reflex to WB Serum   Result Value Ref Range    Lyme Disease Antibodies Serum 0.06 0.00 - 0.89       ASSESSMENT/PLAN:         (R10.9,  G89.29) Chronic abdominal pain  (primary encounter diagnosis)  Comment: progressing  Plan: It is hard to pin all of the symptoms down with a single diagnosis, but the joint pains and fatigue could be part of an inflammatory bowel condition.  Had sterile urethritis last fall.  Discussed with him causes such as Crohn's, ulcerative colitis, food allergy (gluten or dairy most commonly), irritable bowel syndrome, etc.  Given the chronicity and failure of getting to the diagnosis, will have him meet with gastroenterology next.        (M25.50) Pain in joint, multiple sites  Comment: progressive  Plan: Lyme Disease Violeta with reflex to WB Serum and CCP.               Tolu Mccarty MD  [facility]

## 2018-05-24 LAB — B BURGDOR IGG+IGM SER QL: 0.06 (ref 0–0.89)

## 2018-05-25 LAB
CCP AB SER IA-ACNC: 1 U/ML
TTG IGA SER-ACNC: <1 U/ML
TTG IGG SER-ACNC: 1 U/ML

## 2018-05-26 LAB — MERCURY BLD-MCNC: 5 UG/L (ref 0–10)

## 2018-06-11 ENCOUNTER — TRANSFERRED RECORDS (OUTPATIENT)
Dept: HEALTH INFORMATION MANAGEMENT | Facility: OTHER | Age: 43
End: 2018-06-11

## 2018-06-20 ENCOUNTER — TELEPHONE (OUTPATIENT)
Dept: FAMILY MEDICINE | Facility: OTHER | Age: 43
End: 2018-06-20

## 2018-06-20 NOTE — TELEPHONE ENCOUNTER
Spoke with patient and relayed message below. Patient verbalized understanding and will ask for results to be sent ASAP.     Hannah Marks LPN...................6/20/2018  9:54 AM

## 2018-06-20 NOTE — TELEPHONE ENCOUNTER
Was sent to a GI in the [facility], and MRI was ordered in [place] with contrast tomorrow, would like to stay and have his appendix checked. He thinks he has a chronic appendix, would like to get this taken out, could be what is going on. Would like to talk to you about this, please call  Kim Leroy LPN on 6/20/2018 at 8:35 AM

## 2018-06-20 NOTE — TELEPHONE ENCOUNTER
Let him know I will call, but in the meantime call him and let him know the appendix will show up on the MRI.   Tolu Mccarty MD on 6/20/2018 at 9:49 AM

## 2018-06-22 ENCOUNTER — TRANSFERRED RECORDS (OUTPATIENT)
Dept: HEALTH INFORMATION MANAGEMENT | Facility: OTHER | Age: 43
End: 2018-06-22

## 2018-06-22 ENCOUNTER — HOSPITAL ENCOUNTER (EMERGENCY)
Facility: OTHER | Age: 43
Discharge: HOME | End: 2018-06-22
Payer: COMMERCIAL

## 2018-06-22 VITALS
HEIGHT: 69 IN | BODY MASS INDEX: 27.11 KG/M2 | WEIGHT: 183 LBS | TEMPERATURE: 97 F | OXYGEN SATURATION: 100 % | SYSTOLIC BLOOD PRESSURE: 111 MMHG | RESPIRATION RATE: 18 BRPM | DIASTOLIC BLOOD PRESSURE: 67 MMHG

## 2018-06-22 NOTE — ED TRIAGE NOTES
Patient presents with R flank and back pain x 4 months, worse today. Had MRI in [place] yesterday of abdomen. Concerned it is his appendix.

## 2018-06-27 ENCOUNTER — TRANSFERRED RECORDS (OUTPATIENT)
Dept: HEALTH INFORMATION MANAGEMENT | Facility: OTHER | Age: 43
End: 2018-06-27

## 2018-07-23 NOTE — PROGRESS NOTES
Patient Information     Patient Name  Meliton Finnegan MRN  [id number] Sex  Male   1975      Letter by Tolu Mccarty MD at      Author:  Tolu Mccarty MD Service:  (none) Author Type:  (none)    Filed:   Encounter Date:  2017 Status:  (Other)           Meliton Finnegan  [address] [facility] [address]          2017    Dear Mr. Finnegan:    This is to remind you that you are due for your annual medication management appointment with Tolu Mccarty MD in relation to continued use of omeprazole. Your last office visit to review use of omeprazole was on 16. Additional refills of your medication require you to complete this visit.    Please call [phone] to schedule your appointment.    Thank you for choosing [facility] for your health care needs.    Sincerely,      Refill RN  [facility]

## 2018-07-23 NOTE — PROGRESS NOTES
Patient Information     Patient Name  Meliton Finnegan MRN  [id number] Sex  Male   1975      Letter by Tolu Mccarty MD at      Author:  Tolu Mccarty MD Service:  (none) Author Type:  (none)    Filed:   Encounter Date:  2017 Status:  (Other)           Meliton Finnegan  [address] [facility] [address]          2017    Dear Mr. Finnegan:    Following are the tests completed during your last clinic visit.  The results of these tests are normal and require no further attention unless otherwise noted.            Results for orders placed or performed in visit on 17      HIV 1 & 2      Result  Value Ref Range    HIV-1/HIV-2 ANTIBODY Non-Reactive Non-Reactive   COMPLETE METABOLIC PANEL      Result  Value Ref Range    SODIUM 140 133 - 143 mmol/L    POTASSIUM 4.4 3.5 - 5.1 mmol/L    CHLORIDE 105 98 - 107 mmol/L    CO2,TOTAL 27 21 - 31 mmol/L    ANION GAP 8 5 - 18                    GLUCOSE 101 70 - 105 mg/dL    CALCIUM 9.5 8.6 - 10.3 mg/dL    BUN 17 7 - 25 mg/dL    CREATININE 1.03 0.70 - 1.30 mg/dL    BUN/CREAT RATIO           17                    GFR if African American >60 >60 ml/min/1.73m2    GFR if not African American >60 >60 ml/min/1.73m2    ALBUMIN 4.3 3.5 - 5.7 g/dL    PROTEIN,TOTAL 7.0 6.4 - 8.9 g/dL    GLOBULIN                  2.7 2.0 - 3.7 g/dL    A/G RATIO 1.6 1.0 - 2.0                    BILIRUBIN,TOTAL 0.7 0.3 - 1.0 mg/dL    ALK PHOSPHATASE 47 34 - 104 IU/L    ALT (SGPT) 19 7 - 52 IU/L    AST (SGOT) 18 13 - 39 IU/L   CBC WITH AUTO DIFFERENTIAL      Result  Value Ref Range    WHITE BLOOD COUNT         4.6 4.5 - 11.0 thou/cu mm    RED BLOOD COUNT           5.18 4.30 - 5.90 mil/cu mm    HEMOGLOBIN                16.5 13.5 - 17.5 g/dL    HEMATOCRIT                48.0 37.0 - 53.0 %    MCV                       93 80 - 100 fL    MCH                       31.9 26.0 - 34.0 pg    MCHC                      34.4 32.0 - 36.0 g/dL    RDW                       12.3  11.5 - 15.5 %    PLATELET COUNT            143 140 - 440 thou/cu mm    MPV                       11.5 (H) 6.5 - 11.0 fL    NEUTROPHILS               67.1 42.0 - 72.0 %    LYMPHOCYTES               23.6 20.0 - 44.0 %    MONOCYTES                 7.6 <12.0 %    EOSINOPHILS               1.1 <8.0 %    BASOPHILS                 0.4 <3.0 %    IMMATURE GRANULOCYTES(METAS,MYELOS,PROS) 0.2 %    ABSOLUTE NEUTROPHILS      3.1 1.7 - 7.0 thou/cu mm    ABSOLUTE LYMPHOCYTES      1.1 0.9 - 2.9 thou/cu mm    ABSOLUTE MONOCYTES        0.4 <0.9 thou/cu mm    ABSOLUTE EOSINOPHILS      0.1 <0.5 thou/cu mm    ABSOLUTE BASOPHILS        0.0 <0.3 thou/cu mm    ABSOLUTE IMMATURE GRANULOCYTES(METAS,MYELOS,PROS) 0.0 <=0.3 thou/cu mm   GC CHLAMYDIA TRACH PROBE      Result  Value Ref Range    CHLAMYDIA PCR NOT Detected NOT Detected, Invalid    N GONORRHOEAE PCR NOT Detected NOT Detected, Invalid         If you have any further questions or problems contact my office at  [phone].    Thank you,    Tolu Mccarty MD

## 2018-07-23 NOTE — PROGRESS NOTES
Patient Information     Patient Name  Meliton Finnegan MRN  [id number] Sex  Male   1975      Letter by Tameka Olivo MD at      Author:  Tameka Olivo MD Service:  (none) Author Type:  (none)    Filed:   Encounter Date:  2017 Status:  (Other)           Meliton Finnegan  [address] [facility] [address]          2017    Dear Mr. Finnegan:    This letter is in regards to your upper endoscopy that was done by Tameka Olivo MD on 17.   Biopsies from the EGD showed chronic irritation that is likely due to NSAIDS, aspirin, alcohol, steroids or bile reflux.  As we discussed can trial weaning off proton pump inhibitor.  Take 20 mg omeprazole daily for 2 weeks than every other day for 2 weeks.  Start carafate 1 gm oral three times daily for a month then can take as needed.  If having worsening problems should see me in clinic.      If you have any questions, please call the Surgery department at [phone].  A copy of this report will be placed in your electronic  medical record for your primary care provider's review.     Sincerely,      General Surgery    Reviewed and electronically signed by provider.

## 2018-08-10 DIAGNOSIS — R10.84 ABDOMINAL PAIN, GENERALIZED: ICD-10-CM

## 2018-08-15 NOTE — TELEPHONE ENCOUNTER
Omeprazole  LOV-05/23/2018-He tried stopping the PPI due to the GI upset.  With stopping it, symptoms seemed to get worse.  Resumed the PPI a few weeks ago when he got back on antibiotics.    Prescription refilled per RN Medication RefillPolicy.................... Savana Quiroga ....................  8/15/2018   9:06 AM

## 2018-10-09 ENCOUNTER — OFFICE VISIT (OUTPATIENT)
Dept: FAMILY MEDICINE | Facility: OTHER | Age: 43
End: 2018-10-09
Attending: FAMILY MEDICINE
Payer: COMMERCIAL

## 2018-10-09 VITALS
RESPIRATION RATE: 16 BRPM | WEIGHT: 192.8 LBS | SYSTOLIC BLOOD PRESSURE: 126 MMHG | BODY MASS INDEX: 28.47 KG/M2 | DIASTOLIC BLOOD PRESSURE: 68 MMHG

## 2018-10-09 DIAGNOSIS — G89.29 ABDOMINAL PAIN, CHRONIC, RIGHT LOWER QUADRANT: Primary | ICD-10-CM

## 2018-10-09 DIAGNOSIS — R10.31 ABDOMINAL PAIN, CHRONIC, RIGHT LOWER QUADRANT: Primary | ICD-10-CM

## 2018-10-09 DIAGNOSIS — F43.22 ADJUSTMENT DISORDER WITH ANXIOUS MOOD: ICD-10-CM

## 2018-10-09 PROCEDURE — 99214 OFFICE O/P EST MOD 30 MIN: CPT | Performed by: FAMILY MEDICINE

## 2018-10-09 RX ORDER — BUPROPION HYDROCHLORIDE 150 MG/1
150 TABLET ORAL EVERY MORNING
Qty: 90 TABLET | Refills: 3 | Status: SHIPPED | OUTPATIENT
Start: 2018-10-09 | End: 2019-11-05

## 2018-10-09 ASSESSMENT — ANXIETY QUESTIONNAIRES
2. NOT BEING ABLE TO STOP OR CONTROL WORRYING: MORE THAN HALF THE DAYS
1. FEELING NERVOUS, ANXIOUS, OR ON EDGE: MORE THAN HALF THE DAYS
6. BECOMING EASILY ANNOYED OR IRRITABLE: SEVERAL DAYS
3. WORRYING TOO MUCH ABOUT DIFFERENT THINGS: SEVERAL DAYS
IF YOU CHECKED OFF ANY PROBLEMS ON THIS QUESTIONNAIRE, HOW DIFFICULT HAVE THESE PROBLEMS MADE IT FOR YOU TO DO YOUR WORK, TAKE CARE OF THINGS AT HOME, OR GET ALONG WITH OTHER PEOPLE: SOMEWHAT DIFFICULT
GAD7 TOTAL SCORE: 8
7. FEELING AFRAID AS IF SOMETHING AWFUL MIGHT HAPPEN: SEVERAL DAYS
5. BEING SO RESTLESS THAT IT IS HARD TO SIT STILL: NOT AT ALL

## 2018-10-09 ASSESSMENT — ENCOUNTER SYMPTOMS
DECREASED CONCENTRATION: 1
ABDOMINAL DISTENTION: 1
CONSTIPATION: 0
ABDOMINAL PAIN: 1
NAUSEA: 0
SLEEP DISTURBANCE: 1
HEMATOCHEZIA: 0
NERVOUS/ANXIOUS: 1
ARTHRALGIAS: 0
FATIGUE: 1
DIARRHEA: 0
FEVER: 0
RECTAL PAIN: 0

## 2018-10-09 ASSESSMENT — PATIENT HEALTH QUESTIONNAIRE - PHQ9: 5. POOR APPETITE OR OVEREATING: SEVERAL DAYS

## 2018-10-09 ASSESSMENT — PAIN SCALES - GENERAL: PAINLEVEL: MILD PAIN (2)

## 2018-10-09 NOTE — PROGRESS NOTES
"  SUBJECTIVE:   Meliton Finnegan is a 43 year old male who presents to clinic today for the following health issues:    HPI  Follow up anxiety and abdomen issues.  Had met with GI over the summer.  Had an abdominal MRI at [facility].  With this test he had severe bloating and pain.  After the MRI was done, he went home and woke up the next morning, drove back there to the EMERGENCY DEPARTMENT.  He was very convinced this was all from his appendix.  Was disappointed to hear the MRI was negative.  In the emergency department he was told it might have been muscloskeletal.  Met with Dr. Magallon who I believe is PMR.  Had a back manipulation there was given flexeril.  Symptoms improved a little.  Over the summer, would have spells of moderate \"gurgling\" then would improve.  About 1 week ago had a significant flare, and \"my mind is going crazy\".  He fears he is also getting depressed.  Has a follow up tomorrow with his counselor.  He says this really started last November, with knifelike back pain and penile discharge.  Work up was negative, but he did have some antibiotics then.  This series of events seems to have triggered the GI symptoms.  Has been researching this a lot online.  In his mind he is fearful there is something serious going on.  Sleeps well, but wakes up without significant energy.  Racing thoughts.  Has tried meditation, not much help.  Still exercising and eating healthy.  Was on prozac a few years ago, significant side effects, sexual mostly.  Zoloft also caused him to have some obsessive thoughts.  He has 3 kids, worries about them a lot, especially the daughters.      PHQ-9 SCORE 1/11/2017 2/17/2017 10/9/2018   Total Score 2 4 6     LEANA-7 SCORE 1/11/2017 11/1/2017 10/9/2018   Total Score 5 3 8     He is still quite certain he has chronic appendicitis and wondering about a colonoscopy.  Pain is always in the right lower quadrant, from flank into the right lower quadrant.  He has stopped all " tobacco.  elmination diets, no dairy, has not helped the symptoms.  Weight is stable.  Has tried probiotics as well, no help.  In his research, he feels the symptoms do not fit with irritable bowel, given he has no diarrhea and no constipation.          Patient Active Problem List    Diagnosis Date Noted     Hypercholesterolemia 01/30/2018     Priority: Medium     Overview:   mild       Venereal wart 01/30/2018     Priority: Medium     Epigastric pain 12/06/2017     Priority: Medium     Partial tear of left subscapularis tendon, initial encounter 05/02/2017     Priority: Medium     Adjustment disorder with anxious mood 01/11/2017     Priority: Medium     BPPV (benign paroxysmal positional vertigo) 11/15/2016     Priority: Medium     S/P shoulder surgery 10/26/2016     Priority: Medium     Overview:   Bilateral       Subluxation of tendon of long head of biceps 05/17/2016     Priority: Medium     Labral tear of shoulder, right, initial encounter 05/17/2016     Priority: Medium     Incomplete tear of right rotator cuff 05/17/2016     Priority: Medium     Acromioclavicular joint arthritis 05/17/2016     Priority: Medium     Gastroesophageal reflux disease without esophagitis 12/14/2015     Priority: Medium     Past Surgical History:   Procedure Laterality Date     ESOPHAGOSCOPY, GASTROSCOPY, DUODENOSCOPY (EGD), COMBINED      12/7/2017     EXTRACTION(S) DENTAL      under anesthesia     FINGER SURGERY      Tendon repair, right fifth finger, under anesthesia     OTHER SURGICAL HISTORY      52744,OUTER EAR SURGERY,Left ear surgery for cartilage reconstruction     OTHER SURGICAL HISTORY      2017,[id number],SHOULDER SURGERY,Bilateral     VASECTOMY      No Comments Provided     VASECTOMY      reversal scheduled for 12/06     Social History   Substance Use Topics     Smoking status: Never Smoker     Smokeless tobacco: Former User     Types: Chew     Alcohol use Yes      Comment: Alcoholic Drinks/day: Occasional     Current  Outpatient Prescriptions   Medication Sig Dispense Refill     omeprazole (PRILOSEC) 20 MG CR capsule TAKE 1 CAPSULE(20 MG) BY MOUTH DAILY 90 capsule 1     No Known Allergies    Review of Systems   Constitutional: Positive for fatigue. Negative for fever.   Gastrointestinal: Positive for abdominal distention and abdominal pain. Negative for constipation, diarrhea, hematochezia, nausea and rectal pain.   Musculoskeletal: Negative for arthralgias.   Skin: Negative for rash.   Psychiatric/Behavioral: Positive for decreased concentration and sleep disturbance. The patient is nervous/anxious.         OBJECTIVE:     /68 (BP Location: Right arm, Patient Position: Sitting, Cuff Size: Adult Regular)  Resp 16  Wt 192 lb 12.8 oz (87.5 kg)  BMI 28.47 kg/m2  Body mass index is 28.47 kg/(m^2).  Physical Exam   Constitutional: He appears well-developed. No distress.   Cardiovascular: Normal rate, regular rhythm and normal heart sounds.    Pulmonary/Chest: Effort normal. No respiratory distress. He has no wheezes. He has no rales.   Abdominal: Soft. He exhibits no distension. There is no tenderness. There is no rebound and no guarding.   Skin: Skin is warm. No rash noted. He is not diaphoretic. No erythema.   Psychiatric:   Moderate anxious mood, but with fair to good insight into his issues.  Is willing to pursue all angles to his symptoms.  Open to the mental health problems.       Diagnostic Test Results:  none     ASSESSMENT/PLAN:         (R10.31,  G89.29) Abdominal pain, chronic, right lower quadrant  (primary encounter diagnosis)  Comment: this has been present now for 1 year.  GI consult and abdomen MRI not diagnostic.  Will next consult with general surgery, along with possible colonoscopy.    Plan: GENERAL SURG ADULT REFERRAL             (F43.22) Adjustment disorder with anxious mood  Comment: ongoing  Plan: buPROPion (WELLBUTRIN XL) 150 MG 24 hr tablet        Counseling is more helpful than anything for this and  he is starting this again tomorrow.  Will add on the above med, realizing this helps much more for the depressive symptoms. It has no sexual side effects at all.  Follow up in 4 weeks on this.        Tolu Mccarty MD  [facility]

## 2018-10-09 NOTE — NURSING NOTE
Coming in for abdominal discomfort that is increasing the Anxiety  Kim Leroy LPN on 10/9/2018 at 11:34 AM

## 2018-10-09 NOTE — MR AVS SNAPSHOT
After Visit Summary   10/9/2018    Meliton Finnegan    MRN: [id number]           Patient Information     Date Of Birth          1975        Visit Information        Provider Department      10/9/2018 11:30 AM Tolu Mccarty MD [facility]        Today's Diagnoses     Abdominal pain, chronic, right lower quadrant    -  1    Adjustment disorder with anxious mood           Follow-ups after your visit        Additional Services     GENERAL SURG ADULT REFERRAL       Your provider has referred you to: [facility]: [facility] [phone]   [email or web address]    Bony if available    Please be aware that coverage of these services is subject to the terms and limitations of your health insurance plan.  Call member services at your health plan with any benefit or coverage questions.      Please bring the following with you to your appointment:    (1) Any X-Rays, CTs or MRIs which have been performed.  Contact the facility where they were done to arrange for  prior to your scheduled appointment.   (2) List of current medications   (3) This referral request   (4) Any documents/labs given to you for this referral                  Follow-up notes from your care team     Return in about 4 weeks (around 11/6/2018).      Who to contact     If you have questions or need follow up information about today's clinic visit or your schedule please contact [facility] directly at [phone].  Normal or non-critical lab and imaging results will be communicated to you by MyChart, letter or phone within 4 business days after the clinic has received the results. If you do not hear from us within 7 days, please contact the clinic through MyChart or phone. If you have a critical or abnormal lab result, we will notify you by phone as soon as possible.  Submit refill requests through [employer] or call your pharmacy and they will forward  "the refill request to us. Please allow 3 business days for your refill to be completed.          Additional Information About Your Visit        Rexly[employer]join Information     [employer] lets you send messages to your doctor, view your test results, renew your prescriptions, schedule appointments and more. To sign up, go to [email or web address] . Click on \"Log in\" on the left side of the screen, which will take you to the Welcome page. Then click on \"Sign up Now\" on the right side of the page.     You will be asked to enter the access code listed below, as well as some personal information. Please follow the directions to create your username and password.     Your access code is: [email or web address]  Expires: 2019 12:14 PM     Your access code will  in 90 days. If you need help or a new code, please call your [place] clinic or [phone].        Care EveryWhere ID     This is your Care EveryWhere ID. This could be used by other organizations to access your [place] medical records  [id number]        Your Vitals Were     Respirations BMI (Body Mass Index)                16 28.47 kg/m2           Blood Pressure from Last 3 Encounters:   10/09/18 126/68   18 111/67   18 124/64    Weight from Last 3 Encounters:   10/09/18 192 lb 12.8 oz (87.5 kg)   18 183 lb (83 kg)   18 187 lb (84.8 kg)              We Performed the Following     GENERAL SURG ADULT REFERRAL          Today's Medication Changes          These changes are accurate as of 10/9/18 12:14 PM.  If you have any questions, ask your nurse or doctor.               Start taking these medicines.        Dose/Directions    buPROPion 150 MG 24 hr tablet   Commonly known as:  WELLBUTRIN XL   Used for:  Adjustment disorder with anxious mood   Started by:  Tolu Mccarty MD        Dose:  150 mg   Take 1 tablet (150 mg) by mouth every morning   Quantity:  90 tablet   Refills:  3            Where to get your medicines      These medications were sent " to [id number] - [place], [place] - [address]  [address], [place] [address]     Phone:  [phone]     buPROPion 150 MG 24 hr tablet                Primary Care Provider Office Phone # Fax #    Tolu Mccarty [phone]       [address]   [facility] [address]        Equal Access to Services     ROCKY CRAIG : Hadii aad ku hadasho Soomaali, waaxda luqadaha, qaybta kaalmada adeegyada, waxay idiin hayaan adeeg luis ross . So [facility] [phone].    ATENCIÓN: Si edelmira molina, tiene a garner disposición servicios gratuitos de asistencia lingüística. [facility] [phone].    We comply with applicable federal civil rights laws and [place] laws. We do not discriminate on the basis of race, color, national origin, age, disability, sex, sexual orientation, or gender identity.            Thank you!     Thank you for choosing [facility] AND [facility]  for your care. Our goal is always to provide you with excellent care. Hearing back from our patients is one way we can continue to improve our services. Please take a few minutes to complete the written survey that you may receive in the mail after your visit with us. Thank you!             Your Updated Medication List - Protect others around you: Learn how to safely use, store and throw away your medicines at www.disposemymeds.org.          This list is accurate as of 10/9/18 12:14 PM.  Always use your most recent med list.                   Brand Name Dispense Instructions for use Diagnosis    buPROPion 150 MG 24 hr tablet    WELLBUTRIN XL    90 tablet    Take 1 tablet (150 mg) by mouth every morning    Adjustment disorder with anxious mood       omeprazole 20 MG CR capsule    priLOSEC    90 capsule    TAKE 1 CAPSULE(20 MG) BY MOUTH DAILY    Abdominal pain, generalized

## 2018-10-10 ASSESSMENT — ANXIETY QUESTIONNAIRES: GAD7 TOTAL SCORE: 8

## 2018-10-10 ASSESSMENT — PATIENT HEALTH QUESTIONNAIRE - PHQ9: SUM OF ALL RESPONSES TO PHQ QUESTIONS 1-9: 6

## 2018-10-31 ENCOUNTER — TRANSFERRED RECORDS (OUTPATIENT)
Dept: HEALTH INFORMATION MANAGEMENT | Facility: OTHER | Age: 43
End: 2018-10-31

## 2018-11-06 ENCOUNTER — OFFICE VISIT (OUTPATIENT)
Dept: FAMILY MEDICINE | Facility: OTHER | Age: 43
End: 2018-11-06
Attending: FAMILY MEDICINE
Payer: COMMERCIAL

## 2018-11-06 VITALS
WEIGHT: 188.8 LBS | RESPIRATION RATE: 14 BRPM | SYSTOLIC BLOOD PRESSURE: 126 MMHG | BODY MASS INDEX: 27.88 KG/M2 | DIASTOLIC BLOOD PRESSURE: 66 MMHG

## 2018-11-06 DIAGNOSIS — G89.29 CHRONIC GENERALIZED ABDOMINAL PAIN: Primary | ICD-10-CM

## 2018-11-06 DIAGNOSIS — F43.22 ADJUSTMENT DISORDER WITH ANXIOUS MOOD: ICD-10-CM

## 2018-11-06 DIAGNOSIS — R10.84 CHRONIC GENERALIZED ABDOMINAL PAIN: Primary | ICD-10-CM

## 2018-11-06 LAB
C DIFF TOX B STL QL: NEGATIVE
LACTOFERRIN STL QL IA: NEGATIVE
SPECIMEN SOURCE: NORMAL

## 2018-11-06 PROCEDURE — 87493 C DIFF AMPLIFIED PROBE: CPT | Performed by: FAMILY MEDICINE

## 2018-11-06 PROCEDURE — 83630 LACTOFERRIN FECAL (QUAL): CPT | Performed by: FAMILY MEDICINE

## 2018-11-06 PROCEDURE — 99214 OFFICE O/P EST MOD 30 MIN: CPT | Performed by: FAMILY MEDICINE

## 2018-11-06 ASSESSMENT — ANXIETY QUESTIONNAIRES
7. FEELING AFRAID AS IF SOMETHING AWFUL MIGHT HAPPEN: SEVERAL DAYS
2. NOT BEING ABLE TO STOP OR CONTROL WORRYING: SEVERAL DAYS
3. WORRYING TOO MUCH ABOUT DIFFERENT THINGS: SEVERAL DAYS
1. FEELING NERVOUS, ANXIOUS, OR ON EDGE: MORE THAN HALF THE DAYS
GAD7 TOTAL SCORE: 7
IF YOU CHECKED OFF ANY PROBLEMS ON THIS QUESTIONNAIRE, HOW DIFFICULT HAVE THESE PROBLEMS MADE IT FOR YOU TO DO YOUR WORK, TAKE CARE OF THINGS AT HOME, OR GET ALONG WITH OTHER PEOPLE: SOMEWHAT DIFFICULT
5. BEING SO RESTLESS THAT IT IS HARD TO SIT STILL: NOT AT ALL
6. BECOMING EASILY ANNOYED OR IRRITABLE: SEVERAL DAYS

## 2018-11-06 ASSESSMENT — ENCOUNTER SYMPTOMS
DECREASED CONCENTRATION: 1
DIAPHORESIS: 0
DIARRHEA: 1
SHORTNESS OF BREATH: 0
FEVER: 0
ABDOMINAL PAIN: 1
ABDOMINAL DISTENTION: 1
SLEEP DISTURBANCE: 0
FATIGUE: 1

## 2018-11-06 ASSESSMENT — PATIENT HEALTH QUESTIONNAIRE - PHQ9
5. POOR APPETITE OR OVEREATING: SEVERAL DAYS
SUM OF ALL RESPONSES TO PHQ QUESTIONS 1-9: 8

## 2018-11-06 ASSESSMENT — PAIN SCALES - GENERAL: PAINLEVEL: MILD PAIN (2)

## 2018-11-06 NOTE — MR AVS SNAPSHOT
"              After Visit Summary   11/6/2018    Meliton Finnegan    MRN: [id number]           Patient Information     Date Of Birth          1975        Visit Information        Provider Department      11/6/2018 8:45 AM Tolu Mccarty MD [facility]        Today's Diagnoses     Chronic generalized abdominal pain    -  1    Adjustment disorder with anxious mood           Follow-ups after your visit        Future tests that were ordered for you today     Open Future Orders        Priority Expected Expires Ordered    Clostridium difficile Toxin B PCR Routine  11/7/2019 11/6/2018    Fecal Lactoferrin Routine  11/6/2019 11/6/2018            Who to contact     If you have questions or need follow up information about today's clinic visit or your schedule please contact [facility] directly at [phone].  Normal or non-critical lab and imaging results will be communicated to you by Tresatahart, letter or phone within 4 business days after the clinic has received the results. If you do not hear from us within 7 days, please contact the clinic through Tresatahart or phone. If you have a critical or abnormal lab result, we will notify you by phone as soon as possible.  Submit refill requests through [employer] or call your pharmacy and they will forward the refill request to us. Please allow 3 business days for your refill to be completed.          Additional Information About Your Visit        Tres[employer]hart Information     [facility] [employer] lets you send messages to your doctor, view your test results, renew your prescriptions, schedule appointments and more. To sign up, go to [email or web address] . Click on \"Log in\" on the left side of the screen, which will take you to the Welcome page. Then click on \"Sign up Now\" on the right side of the page.     You will be asked to enter the access code listed below, as well as some personal information. Please follow the directions to create your username " and password.     Your access code is: [email or web address]  Expires: 2019 11:14 AM     Your access code will  in 90 days. If you need help or a new code, please call your [place] clinic or [phone].        Care EveryWhere ID     This is your Care EveryWhere ID. This could be used by other organizations to access your [place] medical records  [id number]        Your Vitals Were     Respirations BMI (Body Mass Index)                14 27.88 kg/m2           Blood Pressure from Last 3 Encounters:   18 126/66   10/09/18 126/68   18 111/67    Weight from Last 3 Encounters:   18 188 lb 12.8 oz (85.6 kg)   10/09/18 192 lb 12.8 oz (87.5 kg)   18 183 lb (83 kg)                 Today's Medication Changes          These changes are accurate as of 18  9:18 AM.  If you have any questions, ask your nurse or doctor.               Start taking these medicines.        Dose/Directions    rifaximin 550 MG Tabs tablet   Commonly known as:  XIFAXAN   Used for:  Chronic generalized abdominal pain   Started by:  Tolu Mccarty MD        Dose:  550 mg   Take 1 tablet (550 mg) by mouth 2 times daily for 14 days   Quantity:  28 tablet   Refills:  0            Where to get your medicines      These medications were sent to [id number] - [place], [place] - [address]  [address], [facility] [address]     Phone:  [phone]     rifaximin 550 MG Tabs tablet                Primary Care Provider Office Phone # Fax #    Tolu Mccarty [phone]       [address] [facility] [address]        Equal Access to Services     [facility] KIARA AH: Parveen Rocha, wayvonne betancourt, qaybta kaalmada chinedu, brittni milner. So [facility] [phone].    ATENCIÓN: Si habla español, tiene a garner disposición servicios gratuitos de asistencia lingüística. Soheila al [phone].    We comply with applicable federal  civil rights laws and [place] laws. We do not discriminate on the basis of race, color, national origin, age, disability, sex, sexual orientation, or gender identity.            Thank you!     Thank you for choosing [facility] AND [facility]  for your care. Our goal is always to provide you with excellent care. Hearing back from our patients is one way we can continue to improve our services. Please take a few minutes to complete the written survey that you may receive in the mail after your visit with us. Thank you!             Your Updated Medication List - Protect others around you: Learn how to safely use, store and throw away your medicines at www.disposemymeds.org.          This list is accurate as of 11/6/18  9:18 AM.  Always use your most recent med list.                   Brand Name Dispense Instructions for use Diagnosis    buPROPion 150 MG 24 hr tablet    WELLBUTRIN XL    90 tablet    Take 1 tablet (150 mg) by mouth every morning    Adjustment disorder with anxious mood       rifaximin 550 MG Tabs tablet    XIFAXAN    28 tablet    Take 1 tablet (550 mg) by mouth 2 times daily for 14 days    Chronic generalized abdominal pain

## 2018-11-06 NOTE — NURSING NOTE
"Coming in for a f/u on Anxiety    Chief Complaint   Patient presents with     RECHECK     Anxiety       Initial /66 (BP Location: Right arm, Patient Position: Sitting, Cuff Size: Adult Regular)  Resp 14  Wt 188 lb 12.8 oz (85.6 kg)  BMI 27.88 kg/m2 Estimated body mass index is 27.88 kg/(m^2) as calculated from the following:    Height as of 6/22/18: 5' 9\" (1.753 m).    Weight as of this encounter: 188 lb 12.8 oz (85.6 kg).  Medication Reconciliation: complete    Kim Leroy LPN    "

## 2018-11-06 NOTE — LETTER
November 6, 2018      Meliton Finnegan  [address] [facility] [address] [facility] [address]        Dear Meliton,     These are normal.  This means no inflammation of your intestines.  A colonoscopy is not yet needed.      Sincerely,        Tolu Mccarty MD

## 2018-11-06 NOTE — PROGRESS NOTES
"  SUBJECTIVE:   Meliton Finnegan is a 43 year old male who presents to clinic today for the following health issues:    HPI  Follow up on abdomen pains and anxiety.  Currently is \"pretty good mentally\".  This kicked in a few days ago.  Prior to then had lots of anxiety.  He actually increased the wellbutrin to 300 milligram on his own.  This helped him make it through the [employer] hunting trip.  [place] \"like I was on pins and needles\" on the med, with lots of ringing in his ears.  Dropped back to 1 daily.  And has been thinking about it, feels that the fall is the worst time for him.  Feels he has has SAD.  Got a light now.  The first day on the light he felt \"really good\".  But since the effect has been more blunted.  Still tired, sluggish and has ongoing abdomen pains with increased bowel noises.  Had an online consult with a gastroenterologist, who suspects he has small intestinal bacterial overgrowth (SIBO).  Was told to do a course of Rifaximin if the breath testing is not available.    Was told to test for C. Diff.  In the last year has had septra, rocephin, doxy and azithromycin.  Gets diarrhea sometimes after eating, max would be about 3 stools in a day.  After eating mostly.      Contnues to see a counselor who has also advised he see a psych nurse practitioner.    PHQ-9 SCORE 2/17/2017 10/9/2018 11/6/2018   Total Score 4 6 8         Patient Active Problem List    Diagnosis Date Noted     Hypercholesterolemia 01/30/2018     Priority: Medium     Overview:   mild       Venereal wart 01/30/2018     Priority: Medium     Epigastric pain 12/06/2017     Priority: Medium     Partial tear of left subscapularis tendon, initial encounter 05/02/2017     Priority: Medium     Adjustment disorder with anxious mood 01/11/2017     Priority: Medium     BPPV (benign paroxysmal positional vertigo) 11/15/2016     Priority: Medium     S/P shoulder surgery 10/26/2016     Priority: Medium     Overview:   Bilateral       Subluxation of " tendon of long head of biceps 05/17/2016     Priority: Medium     Labral tear of shoulder, right, initial encounter 05/17/2016     Priority: Medium     Incomplete tear of right rotator cuff 05/17/2016     Priority: Medium     Acromioclavicular joint arthritis 05/17/2016     Priority: Medium     Gastroesophageal reflux disease without esophagitis 12/14/2015     Priority: Medium     Past Surgical History:   Procedure Laterality Date     ESOPHAGOSCOPY, GASTROSCOPY, DUODENOSCOPY (EGD), COMBINED      12/7/2017     EXTRACTION(S) DENTAL      under anesthesia     FINGER SURGERY      Tendon repair, right fifth finger, under anesthesia     OTHER SURGICAL HISTORY      80130,OUTER EAR SURGERY,Left ear surgery for cartilage reconstruction     OTHER SURGICAL HISTORY      2017,GQD392,SHOULDER SURGERY,Bilateral     VASECTOMY      No Comments Provided     VASECTOMY      reversal scheduled for 12/06     Social History   Substance Use Topics     Smoking status: Never Smoker     Smokeless tobacco: Former User     Types: Chew     Alcohol use Yes      Comment: Alcoholic Drinks/day: Occasional     Current Outpatient Prescriptions   Medication Sig Dispense Refill     rifaximin (XIFAXAN) 550 MG TABS tablet Take 1 tablet (550 mg) by mouth 2 times daily for 14 days 28 tablet 0     buPROPion (WELLBUTRIN XL) 150 MG 24 hr tablet Take 1 tablet (150 mg) by mouth every morning 90 tablet 3     No Known Allergies    Review of Systems   Constitutional: Positive for fatigue. Negative for diaphoresis and fever.   Respiratory: Negative for shortness of breath.    Cardiovascular: Negative for chest pain.   Gastrointestinal: Positive for abdominal distention, abdominal pain and diarrhea.   Psychiatric/Behavioral: Positive for decreased concentration. Negative for sleep disturbance.        OBJECTIVE:     /66 (BP Location: Right arm, Patient Position: Sitting, Cuff Size: Adult Regular)  Resp 14  Wt 188 lb 12.8 oz (85.6 kg)  BMI 27.88 kg/m2  Body  mass index is 27.88 kg/(m^2).  Physical Exam   Constitutional: He is oriented to person, place, and time. He appears well-developed. No distress.   Abdominal: Soft. He exhibits no distension. There is no tenderness. There is no rebound and no guarding.   Neurological: He is alert and oriented to person, place, and time.   Skin: Skin is warm and dry. No rash noted. He is not diaphoretic. No erythema.   Psychiatric: He has a normal mood and affect. His behavior is normal. Thought content normal.           ASSESSMENT/PLAN:         (R10.84,  G89.29) Chronic generalized abdominal pain  (primary encounter diagnosis)  Comment: has been about 1 year now.  I went over the past antibiotics he has taken.  Reviewed the suggestions from the consultant and we can proceed with that plan.  He wants to hold off on a nutritionist consult for a FODMAP diet.  We cannot do breath testing here but will simply treat for the SIBO with the antibiotics.  Follow up in 4-6 weeks if not improving.  Colonoscopy if the lactoferrin is positive.  Plan: Clostridium difficile Toxin B PCR, Fecal         Lactoferrin, rifaximin (XIFAXAN) 550 MG TABS         tablet             (F43.22) Adjustment disorder with anxious mood  Comment: ongoing  Plan: is in counseling, and I agree with the SAD light.  He will also be seeing a CNP soon from further med management.  30 minutes with him, over 1/2 in counseling and coordination of care.      Tolu Mccarty MD  [facility]      Results for orders placed or performed in visit on 11/06/18   Fecal Lactoferrin   Result Value Ref Range    Fecal Lactoferrin Negative NEG^Negative   Clostridium difficile Toxin B PCR   Result Value Ref Range    Specimen Description Feces     C Diff Toxin B PCR Negative NEG^Negative     No current need for a colonoscopy.    Tolu Mccarty MD on 11/6/2018 at 2:44 PM

## 2018-11-07 ASSESSMENT — ANXIETY QUESTIONNAIRES: GAD7 TOTAL SCORE: 7

## 2018-11-14 ENCOUNTER — TELEPHONE (OUTPATIENT)
Dept: FAMILY MEDICINE | Facility: OTHER | Age: 43
End: 2018-11-14

## 2018-11-14 NOTE — TELEPHONE ENCOUNTER
Patient states feels med is not working. Offered appt with another provider, patient accepted but would like message sent to Tolu Mccarty MD to see if he is able to work in.  Rosalie Orellana LPN .............11/14/2018     1:54 PM

## 2018-11-14 NOTE — TELEPHONE ENCOUNTER
Pt doesn't feel his anxiety medication is working and needed a follow up med check and can't get him in for 2 weeks--If  could try something else and send it in or he would like to get in by next Mon.or Tues.    .Kati Finch on 11/14/2018 at 1:07 PM

## 2018-11-14 NOTE — TELEPHONE ENCOUNTER
TJP is not back to clinic until Monday, will leave message for primary nurse to check.  Rosalie Orellana LPN .............11/14/2018     1:39 PM

## 2018-11-19 ENCOUNTER — OFFICE VISIT (OUTPATIENT)
Dept: FAMILY MEDICINE | Facility: OTHER | Age: 43
End: 2018-11-19
Attending: FAMILY MEDICINE
Payer: COMMERCIAL

## 2018-11-19 VITALS
BODY MASS INDEX: 27.02 KG/M2 | RESPIRATION RATE: 16 BRPM | SYSTOLIC BLOOD PRESSURE: 118 MMHG | WEIGHT: 183 LBS | DIASTOLIC BLOOD PRESSURE: 68 MMHG

## 2018-11-19 DIAGNOSIS — F33.8 SEASONAL AFFECTIVE DISORDER (H): Primary | ICD-10-CM

## 2018-11-19 PROCEDURE — 99213 OFFICE O/P EST LOW 20 MIN: CPT | Performed by: FAMILY MEDICINE

## 2018-11-19 RX ORDER — CITALOPRAM HYDROBROMIDE 20 MG/1
20 TABLET ORAL DAILY
Qty: 90 TABLET | Refills: 3 | Status: SHIPPED | OUTPATIENT
Start: 2018-11-19 | End: 2020-06-25

## 2018-11-19 RX ORDER — LORAZEPAM 1 MG/1
1 TABLET ORAL EVERY 8 HOURS PRN
Qty: 20 TABLET | Refills: 0 | Status: SHIPPED | OUTPATIENT
Start: 2018-11-19 | End: 2020-06-25

## 2018-11-19 ASSESSMENT — ANXIETY QUESTIONNAIRES
IF YOU CHECKED OFF ANY PROBLEMS ON THIS QUESTIONNAIRE, HOW DIFFICULT HAVE THESE PROBLEMS MADE IT FOR YOU TO DO YOUR WORK, TAKE CARE OF THINGS AT HOME, OR GET ALONG WITH OTHER PEOPLE: SOMEWHAT DIFFICULT
5. BEING SO RESTLESS THAT IT IS HARD TO SIT STILL: NOT AT ALL
2. NOT BEING ABLE TO STOP OR CONTROL WORRYING: SEVERAL DAYS
3. WORRYING TOO MUCH ABOUT DIFFERENT THINGS: SEVERAL DAYS
GAD7 TOTAL SCORE: 4
1. FEELING NERVOUS, ANXIOUS, OR ON EDGE: SEVERAL DAYS
7. FEELING AFRAID AS IF SOMETHING AWFUL MIGHT HAPPEN: NOT AT ALL
6. BECOMING EASILY ANNOYED OR IRRITABLE: SEVERAL DAYS

## 2018-11-19 ASSESSMENT — PATIENT HEALTH QUESTIONNAIRE - PHQ9: 5. POOR APPETITE OR OVEREATING: NOT AT ALL

## 2018-11-19 ASSESSMENT — PAIN SCALES - GENERAL: PAINLEVEL: NO PAIN (1)

## 2018-11-19 NOTE — NURSING NOTE
"Coming in for a medication f/u     Chief Complaint   Patient presents with     Recheck Medication     up date       Initial /68 (BP Location: Right arm, Patient Position: Sitting, Cuff Size: Adult Regular)  Resp 16  Wt 183 lb (83 kg)  BMI 27.02 kg/m2 Estimated body mass index is 27.02 kg/(m^2) as calculated from the following:    Height as of 6/22/18: 5' 9\" (1.753 m).    Weight as of this encounter: 183 lb (83 kg).  Medication Reconciliation: complete    Kim Leroy LPN    "

## 2018-11-19 NOTE — MR AVS SNAPSHOT
"              After Visit Summary   2018    Meliton Finnegan    MRN: [id number]           Patient Information     Date Of Birth          1975        Visit Information        Provider Department      2018 10:45 AM Tolu Mccarty MD [facility]        Today's Diagnoses     Seasonal affective disorder (H)    -  1       Follow-ups after your visit        Who to contact     If you have questions or need follow up information about today's clinic visit or your schedule please contact [facility] directly at [phone].  Normal or non-critical lab and imaging results will be communicated to you by [employer], letter or phone within 4 business days after the clinic has received the results. If you do not hear from us within 7 days, please contact the clinic through [employer] or phone. If you have a critical or abnormal lab result, we will notify you by phone as soon as possible.  Submit refill requests through [employer] or call your pharmacy and they will forward the refill request to us. Please allow 3 business days for your refill to be completed.          Additional Information About Your Visit        MyChart Information     [employer] lets you send messages to your doctor, view your test results, renew your prescriptions, schedule appointments and more. To sign up, go to [email or web address] . Click on \"Log in\" on the left side of the screen, which will take you to the Welcome page. Then click on \"Sign up Now\" on the right side of the page.     You will be asked to enter the access code listed below, as well as some personal information. Please follow the directions to create your username and password.     Your access code is: [email or web address]  Expires: 2019 11:14 AM     Your access code will  in 90 days. If you need help or a new code, please call your [facility] or [phone].        Care EveryWhere ID     This is your Care EveryWhere ID. This could " be used by other organizations to access your [facility] medical records  [id number]        Your Vitals Were     Respirations BMI (Body Mass Index)                16 27.02 kg/m2           Blood Pressure from Last 3 Encounters:   11/19/18 118/68   11/06/18 126/66   10/09/18 126/68    Weight from Last 3 Encounters:   11/19/18 183 lb (83 kg)   11/06/18 188 lb 12.8 oz (85.6 kg)   10/09/18 192 lb 12.8 oz (87.5 kg)              Today, you had the following     No orders found for display         Today's Medication Changes          These changes are accurate as of 11/19/18 11:19 AM.  If you have any questions, ask your nurse or doctor.               Start taking these medicines.        Dose/Directions    citalopram 20 MG tablet   Commonly known as:  celeXA   Used for:  Seasonal affective disorder (H)   Started by:  Tolu Mccarty MD        Dose:  20 mg   Take 1 tablet (20 mg) by mouth daily   Quantity:  90 tablet   Refills:  3       LORazepam 1 MG tablet   Commonly known as:  ATIVAN   Used for:  Seasonal affective disorder (H)   Started by:  Tolu Mccarty MD        Dose:  1 mg   Take 1 tablet (1 mg) by mouth every 8 hours as needed for anxiety   Quantity:  20 tablet   Refills:  0            Where to get your medicines      These medications were sent to [id number] - [place], [place] - [address]  [address], [facility] [address]     Phone:  [phone]     citalopram 20 MG tablet         Some of these will need a paper prescription and others can be bought over the counter.  Ask your nurse if you have questions.     Bring a paper prescription for each of these medications     LORazepam 1 MG tablet                Primary Care Provider Office Phone # Fax #    Tolu Mccarty [phone] [address] [facility] [address]        Equal Access to Services     NITA CRAIG AH: Parveen Rocha, mariya betancourt, kiara mota  brittni chechelsey laJonaan ah. Beryl [facility] [phone].    ATENCIÓN: Si rulala jesse, tiene a garner disposición servicios gratuitos de asistencia lingüística. Soheila al [phone].    We comply with applicable federal civil rights laws and [place] laws. We do not discriminate on the basis of race, color, national origin, age, disability, sex, sexual orientation, or gender identity.            Thank you!     Thank you for choosing [facility] AND [facility]  for your care. Our goal is always to provide you with excellent care. Hearing back from our patients is one way we can continue to improve our services. Please take a few minutes to complete the written survey that you may receive in the mail after your visit with us. Thank you!             Your Updated Medication List - Protect others around you: Learn how to safely use, store and throw away your medicines at www.disposemymeds.org.          This list is accurate as of 11/19/18 11:19 AM.  Always use your most recent med list.                   Brand Name Dispense Instructions for use Diagnosis    buPROPion 150 MG 24 hr tablet    WELLBUTRIN XL    90 tablet    Take 1 tablet (150 mg) by mouth every morning    Adjustment disorder with anxious mood       citalopram 20 MG tablet    celeXA    90 tablet    Take 1 tablet (20 mg) by mouth daily    Seasonal affective disorder (H)       LORazepam 1 MG tablet    ATIVAN    20 tablet    Take 1 tablet (1 mg) by mouth every 8 hours as needed for anxiety    Seasonal affective disorder (H)       rifaximin 550 MG Tabs tablet    XIFAXAN    28 tablet    Take 1 tablet (550 mg) by mouth 2 times daily for 14 days    Chronic generalized abdominal pain

## 2018-11-19 NOTE — PROGRESS NOTES
"  SUBJECTIVE:   Meliton Finnegan is a 43 year old male who presents to clinic today for the following health issues:    HPI  Follow up on his gi issues. Is on the last day of the antibiotics, and he feels the SIBO was the actual correct diagnosis.  feeling better. Is now on a diet, low carb, for 2-3 weeks now.  Has met with a nutritionist for a \"FODMAP\" diet.  Lost about 14# so far on this.    Says every fall and winter his mood drops.  wellbutrin is helping but not quite enough.  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  Has been seeing a counselor as well.  Took prozac once and had significant side effects, twice.  Delayed orgasms mostly.  Some increased anxiety.  Also had a reaction to a single zoloft tab once.  Has a SAD light as well.    PHQ-9 SCORE 2/17/2017 10/9/2018 11/6/2018   Total Score 4 6 8         Patient Active Problem List    Diagnosis Date Noted     Seasonal affective disorder (H) 11/19/2018     Priority: Medium     Hypercholesterolemia 01/30/2018     Priority: Medium     Overview:   mild       Venereal wart 01/30/2018     Priority: Medium     Epigastric pain 12/06/2017     Priority: Medium     Partial tear of left subscapularis tendon, initial encounter 05/02/2017     Priority: Medium     Adjustment disorder with anxious mood 01/11/2017     Priority: Medium     BPPV (benign paroxysmal positional vertigo) 11/15/2016     Priority: Medium     S/P shoulder surgery 10/26/2016     Priority: Medium     Overview:   Bilateral       Subluxation of tendon of long head of biceps 05/17/2016     Priority: Medium     Labral tear of shoulder, right, initial encounter 05/17/2016     Priority: Medium     Incomplete tear of right rotator cuff 05/17/2016     Priority: Medium     Acromioclavicular joint arthritis 05/17/2016     Priority: Medium     Gastroesophageal reflux disease without esophagitis 12/14/2015     Priority: Medium "     Past Surgical History:   Procedure Laterality Date     ESOPHAGOSCOPY, GASTROSCOPY, DUODENOSCOPY (EGD), COMBINED      12/7/2017     EXTRACTION(S) DENTAL      under anesthesia     FINGER SURGERY      Tendon repair, right fifth finger, under anesthesia     OTHER SURGICAL HISTORY      88693,OUTER EAR SURGERY,Left ear surgery for cartilage reconstruction     OTHER SURGICAL HISTORY      2017,ZGJ908,SHOULDER SURGERY,Bilateral     VASECTOMY      No Comments Provided     VASECTOMY      reversal scheduled for 12/06     Social History   Substance Use Topics     Smoking status: Never Smoker     Smokeless tobacco: Former User     Types: Chew     Alcohol use Yes      Comment: Alcoholic Drinks/day: Occasional     Current Outpatient Prescriptions   Medication Sig Dispense Refill     buPROPion (WELLBUTRIN XL) 150 MG 24 hr tablet Take 1 tablet (150 mg) by mouth every morning 90 tablet 3     citalopram (CELEXA) 20 MG tablet Take 1 tablet (20 mg) by mouth daily 90 tablet 3     LORazepam (ATIVAN) 1 MG tablet Take 1 tablet (1 mg) by mouth every 8 hours as needed for anxiety 20 tablet 0     rifaximin (XIFAXAN) 550 MG TABS tablet Take 1 tablet (550 mg) by mouth 2 times daily for 14 days 28 tablet 0     No Known Allergies    Review of Systems     OBJECTIVE:     /68 (BP Location: Right arm, Patient Position: Sitting, Cuff Size: Adult Regular)  Resp 16  Wt 183 lb (83 kg)  BMI 27.02 kg/m2  Body mass index is 27.02 kg/(m^2).  Physical Exam   Constitutional: He is oriented to person, place, and time. He appears well-developed. No distress.   Neurological: He is alert and oriented to person, place, and time.   Skin: He is not diaphoretic.   Psychiatric: He has a normal mood and affect. His behavior is normal. Thought content normal.           ASSESSMENT/PLAN:         (F33.8) Seasonal affective disorder (H)  (primary encounter diagnosis)  Comment: recurrent.  Discussed with him additional benefits of serotonin specific reuptake  inhibitor meds, in particular the anxiety relief.  Will start celexa next and bridge with as needed ativan.  20 minutes with him, over 1/2 in counseling.  Follow up in 6 weeks as needed.  Continue the light and counseling as well.  Plan: citalopram (CELEXA) 20 MG tablet, LORazepam         (ATIVAN) 1 MG tablet                 Tolu Mccarty MD  [facility] AND [facility]

## 2018-11-20 ASSESSMENT — ANXIETY QUESTIONNAIRES: GAD7 TOTAL SCORE: 4

## 2019-03-08 ENCOUNTER — TELEPHONE (OUTPATIENT)
Dept: FAMILY MEDICINE | Facility: OTHER | Age: 44
End: 2019-03-08

## 2019-03-08 DIAGNOSIS — G89.29 CHRONIC GENERALIZED ABDOMINAL PAIN: ICD-10-CM

## 2019-03-08 DIAGNOSIS — R10.84 CHRONIC GENERALIZED ABDOMINAL PAIN: ICD-10-CM

## 2019-03-08 NOTE — TELEPHONE ENCOUNTER
Notified patient of providers note.  Elizabeth Estrada ....................  3/8/2019   9:40 AM

## 2019-03-08 NOTE — TELEPHONE ENCOUNTER
Pt with Ever is wanting to know if he can get another round of antibiotics having symptoms it's back--

## 2019-03-15 ENCOUNTER — TELEPHONE (OUTPATIENT)
Dept: FAMILY MEDICINE | Facility: OTHER | Age: 44
End: 2019-03-15

## 2019-03-15 DIAGNOSIS — G89.29 CHRONIC GENERALIZED ABDOMINAL PAIN: ICD-10-CM

## 2019-03-15 DIAGNOSIS — R10.84 CHRONIC GENERALIZED ABDOMINAL PAIN: ICD-10-CM

## 2019-03-15 NOTE — TELEPHONE ENCOUNTER
Called patient and verified last name and date of birth. Informed him of medication and no further questions.      Marivel Graham LPN on 3/15/2019 at 9:21 AM

## 2019-03-15 NOTE — TELEPHONE ENCOUNTER
Patient called stating according to his online research he needs to be on his medication for 4 weeks at 3 times a day instead of 2 weeks at 2 times a day as you had prescribed for him.  He has been taking this medication 3 times a day since he filled his prescription and will be out on Sunday.    He would like you submit a rx  For 56 more pills to [facility], [place] and call to advise    Thank you

## 2019-03-19 ENCOUNTER — MEDICAL CORRESPONDENCE (OUTPATIENT)
Dept: HEALTH INFORMATION MANAGEMENT | Facility: OTHER | Age: 44
End: 2019-03-19

## 2019-03-19 DIAGNOSIS — F33.9 RECURRENT MAJOR DEPRESSION (H): Primary | ICD-10-CM

## 2019-03-19 LAB — DEPRECATED CALCIDIOL+CALCIFEROL SERPL-MC: 20.1 NG/ML

## 2019-03-19 PROCEDURE — 82306 VITAMIN D 25 HYDROXY: CPT

## 2019-03-19 PROCEDURE — 36415 COLL VENOUS BLD VENIPUNCTURE: CPT

## 2019-09-28 NOTE — TELEPHONE ENCOUNTER
Patient:   ITZ PADILLA            MRN: [id number]            FIN: [id number]               Age:   86 years     Sex:  MALE     :  32   Associated Diagnoses:   None   Author:   SUMMER REYES      Chief Complaint: Altered Mental Status, weakness and hematuria     History of present illness:  86 year old male with past medical history of COPD, HTN, BPH and depression presented via EMS from Dr. Hankins's  office with altered mental status, weakness and hematuria. Due to languange barrier, hearing loss and confusion patient is unable to provide a history. Patient was seen in the ED on 3/22 with worsening lower extremity edema and was discharged home with flomax, lasix 40mg daily and referral to urology. Patient was seen by urology on 3/29, had pickering catheter placed but presented to PCP office on 3/30 with gross bloody output. Family also reported increased confusion and episodes of emotional lability.  They state he has become very weak and having dififuclty getting out of bed and walking on his own, not to mention very confused at times. Unable to give him the prescribed lasix dose because too difficult to assist him in and out of bed. Patient was evaluated by Dr. Starks in January for similar complaints thought to be related to an unspecified dementia   In the ED vital signs were T 37.3, HR 80, RR 18, SpO2 93 and /58. Lab evaluation was negative except for urinalysis showing blood and infection.  CXR with mild cardiomegaly, mild ground glass opacity in the lung field suggestive of mild CHF. CT head without acute intracranial abnormality.       Review of Systems: difficult to assess due to mental status and language barrier    Past medical history:  BPH - Benign prostatic hypertrophy  COPD (chronic obstructive pulmonary disease)  Drop foot gait  GERD (gastroesophageal reflux disease)  HTN (hypertension)    Past surgical history:  Hernia repair and prostate biopsy     Family  Spoke with patient, but he requested a call back in about 20 mins.     Hannah Marks LPN...................5/3/2018  10:24 AM   history:  Mother - heart disease    Social history:   Alcohol  Details: Past  Exercise  Details: Excercise: Never.  Home/Environment  Details: Alcohol Abuse in Household: No.  Substance Abuse in Household: No.  Smoker in Household: No.  Patient Lives With: Son, Spouse.  Ambulation: Independent.  Bathing: Independent.  Dressing: Independent.  Driving: Independent.  Eating: Independent.  Elimination: Independent.  Grooming: Independent.  Preparing Meal: Independent.  Taking Meds: Required Assistance.  Toileting: Independent.  Transfers: Independent.  Assistive Devices Home: Cane, Walker, Wheelchair.  Substance Abuse  Details: None  Tobacco  Details: Yes, Former smoker, Stopped age 72 Years.  Cultural/Taoist Practices  Details: Taoist or Cultural Practices: none.  Taoist or Cultural Practices While in Hospital: No.     Home Medications (11) Active  albuterol inhalation 0.083% 2.5 mg/3 mL solution (Proventil) 2.5 mg = 3 mL, PRN, Inhaled, Q6H  aspirin 81 mg oral tablet 162 mg = 2 tab, Oral, Daily  atorvastatin oral 20 mg tablet 20 mg = 1 tab, Oral, Daily  baclofen oral 20 mg tablet (Lioresal) 20 mg = 1 tab, PRN, Oral, BID  Breo Ellipta inhaler oral 200-25 mcg/puff powder 1 puff, Inhaled, Daily  citalopram oral 10 mg tablet 10 mg = 1 tab, Oral, Q Evening  Lasix oral 20 mg tablet 20 mg = 1 tab, Oral, Daily  Metoprolol Succinate ER 25 mg oral tablet, extended release 25 mg = 1 tab, Oral, Daily  potassium CHLORIDE 10 mEq oral capsule, extended release 10 mEq = 1 cap, Oral, Daily  ProAir HFA 90 mcg/inh inhalation aerosol 2 puff, PRN, Inhaled, Q4H  traMADol oral 50 mg tablet 50 mg = 1 tab, PRN, Oral, Q6H  Medications (11) Active  Scheduled: (8)  Albuterol-ipratropium 2.5-0.5 mg/3 mL nebulizer soln  3 mL, Nebulizer, Q6H  Atorvastatin 20 mg tab  20 mg 1 tab, Oral, Daily  cefTRIAXone-dextrose 5%  1,000 mg 50 mL, IVPB, Q24H  Citalopram 20 mg tab  10 mg 0.5 tab, Oral, Q Evening  Fluticasone-vilanterol 200-25 mcg inhalation  powder 14s  1 puff, Inhaled, Daily  Furosemide 20 mg tab  20 mg 1 tab, Oral, Daily  Metoprolol succinate 25 mg XL tab  25 mg 1 tab, Oral, Daily  Potassium CHLORIDE 10 mEq ER tab  10 mEq 1 tab, Oral, Daily  Continuous: (1)  Sodium Chloride 0.9% 1,000 mL  1,000 mL, IV, 60 mL/hr  PRN: (2)  Acetaminophen 325 mg tab  650 mg 2 tab, Oral, Q4H  TraMADol 50 mg tab  50 mg 1 tab, Oral, Q6H     Allergies (1) Active Reaction  NKA, None Documented      Immunizations:  Pneumovax _up to date  Flu shot _up to date    CODE STATUS: FULL       Vitals between:   30-MAR-2017 09:29:55   TO   31-MAR-2017 09:29:55                   LAST RESULT MINIMUM MAXIMUM  Temperature 37.3 37 37.3  Heart Rate 61 61 87  Respiratory Rate 17 14 20  NISBP           152 138 167  NIDBP           82 58 82  NIMBP           89 88 105  SpO2                    90 88 96    Physical exam:  General:  no acute distress  Head: normocephalic  Eyes: PERRLA, EOMI  Ears, nose, throat: moist oral mucosa, no pharyngeal erythema   Neck: no lymphadenopathy, no JVD  Chest: no increased work of breathing, clear to auscultation bilaterally, no crackles or wheezes appreciated   Cardiac: RRR, no extra heart sounds, no murmurs, no lower extremity edema    Abdomen: soft, +BS, nondistended, nontender to palpation. pickering catheter in place, concentrated urine present in pickering, no clear bleeding   Musculoskeletal: no joint swelling, no calf tenderness   Neurologic: cranial nerves wnl, no focal deficits   Vascular: peripheral pulses +2 bilaterally, no carotid bruit   Skin: no rashes or skin changes  Psychiatric: confused    Labs:   Labs between:  30-MAR-2017 09:31 to 31-MAR-2017 09:31    CBC:                 WBC  HgB  Hct  Plt  MCV  RDW   31-MAR-2017 7.4  13.6  42.4  202  90.6  13.6   30-MAR-2017 10.7  14.2  45.5  238  90.1  13.6     DIFF:                 Seg  Neutroph//ABS  Lymph//ABS  Mono//ABS  EOS/ABS   31-MAR-2017 NOT APPLICABLE  76 // 5.6 12 // (L) 0.9  10 // 0.7 2 //  0.1  30-MAR-2017 NOT APPLICABLE  79 // (H) 8.4  10 // 1.0 10 // (H) 1.1  1 // 0.1    BMP:                 Na  Cl  BUN  Glu   31-MAR-2017 137  102  18  92                              K  CO2  Cr  Ca                              3.8  27  1.09  8.8   BMP:                 Na  Cl  BUN  Glu   30-MAR-2017 141  101  18  96                              K  CO2  Cr  Ca                              3.8  27  1.13  8.4     CMP:                 AST  ALT  AlkPhos  Bili  Albumin   31-MAR-2017 26  34  80  0.7  (L) 2.7     POC GLU:                 Latest Result  Latest Date  Minimum  Min Date  Maximum  Max Date                             84 31-MAR-2017 84 31-MAR-2017 90 31-MAR-2017                     Radiology:      Result title:  XR CHEST 1V  Result status:  Final  Verified by:  KELVIN CASTREJON on 03/30/2017 7:35  IMPRESSION:1.  Moderate cardiomegaly.  Mild ground glass opacity in the lung field suggestive of mild CHF.       Based on the patient's presentation on admission, I expect the patient to require at least 2 midnights of medically necessary Hospital services for the following reasons:      Assessment/Plan:   86 year old male with past medical history of COPD, HTN, BPH and depression presented via EMS from Dr. Hankins's  office with altered mental status, weakness and hematuria.    1. Urinary Tract infection  - UA:  leukocytes, + bacteria  - urine culture pending   - continue ceftriaxone daily    2. Hematuria  - possibly 2/2 UTI    3. Weakness and confusion  - CT head negative for acute findings   - likely an underlying neurodegenerative illness affecting both cognition and gait  - this could be exacerbated by present infection  - PT/OT to evaluate patient, SNF referrals to be placed    4. Urinary Retention  - catheter placed on 3/29  - continue flomax and finasteride added  - retention may be 2/2 UTI  - maintain pickering and do voiding trial in 2 weeks     5. COPD - no current exacerbation  - duonebs q 6h  ordered  - Breo inhaler - 1 puff daily    6. Hypertension  - metoprolol ordered  - will contnue to monitor BP    7. Depression  - contnue citalopram daily     DVT prophylaxis: Scds     PCP: Dr. Rob Cortes DO  [facility]                     Electronically Signed On 04/07/2017 12:04  __________________________________________________   SUMMER REYES

## 2019-11-05 ENCOUNTER — OFFICE VISIT (OUTPATIENT)
Dept: FAMILY MEDICINE | Facility: OTHER | Age: 44
End: 2019-11-05
Attending: NURSE PRACTITIONER
Payer: COMMERCIAL

## 2019-11-05 VITALS
HEART RATE: 84 BPM | RESPIRATION RATE: 16 BRPM | WEIGHT: 194 LBS | DIASTOLIC BLOOD PRESSURE: 60 MMHG | TEMPERATURE: 96.1 F | SYSTOLIC BLOOD PRESSURE: 110 MMHG | HEIGHT: 69 IN | BODY MASS INDEX: 28.73 KG/M2

## 2019-11-05 DIAGNOSIS — M77.12 LATERAL EPICONDYLITIS OF LEFT ELBOW: Primary | ICD-10-CM

## 2019-11-05 PROCEDURE — 99213 OFFICE O/P EST LOW 20 MIN: CPT | Performed by: NURSE PRACTITIONER

## 2019-11-05 ASSESSMENT — MIFFLIN-ST. JEOR: SCORE: 1770.36

## 2019-11-05 ASSESSMENT — PAIN SCALES - GENERAL: PAINLEVEL: MODERATE PAIN (4)

## 2019-11-05 ASSESSMENT — ENCOUNTER SYMPTOMS: ARTHRALGIAS: 1

## 2019-11-05 NOTE — PROGRESS NOTES
"Nursing Notes:   Marivel Graham LPN  11/5/2019  2:37 PM  Signed  Chief Complaint   Patient presents with     Elbow Pain     Pt present to clinic today for left elbow pain he has had for a month.  Initial /60 (BP Location: Right arm, Patient Position: Sitting, Cuff Size: Adult Large)   Pulse 84   Temp 96.1  F (35.6  C) (Tympanic)   Resp 16   Ht 1.753 m (5' 9\")   Wt 88 kg (194 lb)   BMI 28.65 kg/m    Estimated body mass index is 28.65 kg/m  as calculated from the following:    Height as of this encounter: 1.753 m (5' 9\").    Weight as of this encounter: 88 kg (194 lb).  Medication Reconciliation: complete    Marivel Graham LPN  Nursing note reviewed with patient.  Accurracy and completeness verified.   Mr. Finnegan is a 44 year old male who:  Patient presents with:  Elbow Pain      ICD-10-CM    1. 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   All other systems reviewed and are negative.     All other systems reviewed and negative.     LEANA:   LEANA-7 SCORE 10/9/2018 11/6/2018 11/19/2018   Total Score 8 7 4     PHQ9:  PHQ-9 SCORE 2/17/2017 10/9/2018 11/6/2018   PHQ-9 Total Score 4 6 8       I have personally reviewed the past medical history, past surgical history, medications, allergies, family and social history as listed below, on 11/5/2019.    No Known Allergies    Current Outpatient Medications   Medication Sig Dispense Refill     citalopram (CELEXA) 20 MG tablet Take 1 " tablet (20 mg) by mouth daily (Patient not taking: Reported on 11/5/2019) 90 tablet 3     LORazepam (ATIVAN) 1 MG tablet Take 1 tablet (1 mg) by mouth every 8 hours as needed for anxiety (Patient not taking: Reported on 11/5/2019) 20 tablet 0        Patient Active Problem List    Diagnosis Date Noted     Seasonal affective disorder (H) 11/19/2018     Priority: Medium     Hypercholesterolemia 01/30/2018     Priority: Medium     Overview:   mild       Venereal wart 01/30/2018     Priority: Medium     Epigastric pain 12/06/2017     Priority: Medium     Partial tear of left subscapularis tendon, initial encounter 05/02/2017     Priority: Medium     Adjustment disorder with anxious mood 01/11/2017     Priority: Medium     BPPV (benign paroxysmal positional vertigo) 11/15/2016     Priority: Medium     S/P shoulder surgery 10/26/2016     Priority: Medium     Overview:   Bilateral       Subluxation of tendon of long head of biceps 05/17/2016     Priority: Medium     Labral tear of shoulder, right, initial encounter 05/17/2016     Priority: Medium     Incomplete tear of right rotator cuff 05/17/2016     Priority: Medium     Acromioclavicular joint arthritis 05/17/2016     Priority: Medium     Gastroesophageal reflux disease without esophagitis 12/14/2015     Priority: Medium     Past Medical History:   Diagnosis Date     Epigastric pain     12/6/2017     Past Surgical History:   Procedure Laterality Date     ESOPHAGOSCOPY, GASTROSCOPY, DUODENOSCOPY (EGD), COMBINED      12/7/2017     EXTRACTION(S) DENTAL      under anesthesia     FINGER SURGERY      Tendon repair, right fifth finger, under anesthesia     OTHER SURGICAL HISTORY      32476,OUTER EAR SURGERY,Left ear surgery for cartilage reconstruction     OTHER SURGICAL HISTORY      2017,OMA835,SHOULDER SURGERY,Bilateral     VASECTOMY      No Comments Provided     VASECTOMY      reversal scheduled for 12/06     Social History     Socioeconomic History     Marital status:       Spouse name: None     Number of children: None     Years of education: None     Highest education level: None   Occupational History     None   Social Needs     Financial resource strain: None     Food insecurity:     Worry: None     Inability: None     Transportation needs:     Medical: None     Non-medical: None   Tobacco Use     Smoking status: Never Smoker     Smokeless tobacco: Former User     Types: Chew   Substance and Sexual Activity     Alcohol use: Yes     Comment: Alcoholic Drinks/day: Occasional     Drug use: No     Sexual activity: Yes     Partners: Female   Lifestyle     Physical activity:     Days per week: None     Minutes per session: None     Stress: None   Relationships     Social connections:     Talks on phone: None     Gets together: None     Attends Scientologist service: None     Active member of club or organization: None     Attends meetings of clubs or organizations: None     Relationship status: None     Intimate partner violence:     Fear of current or ex partner: None     Emotionally abused: None     Physically abused: None     Forced sexual activity: None   Other Topics Concern     Parent/sibling w/ CABG, MI or angioplasty before 65F 55M? Not Asked   Social History Narrative    Was ; works for [employer] as a . 3/16.  Children 2 daughters, Brooks age 7 and Ashlie age 4, Son Junior     Family History   Problem Relation Age of Onset     Family History Negative Mother         Good Health     Hyperlipidemia Father         Hyperlipidemia,high cholesterol     Family History Negative Daughter         Good Health     Family History Negative Son         Good Health     Family History Negative Other         Good Health       EXAM:   Vitals:    11/05/19 1416   BP: 110/60   BP Location: Right arm   Patient Position: Sitting   Cuff Size: Adult Large   Pulse: 84   Resp: 16   Temp: 96.1  F (35.6  C)   TempSrc: Tympanic   Weight: 88 kg (194 lb)   Height: 1.753  "m (5' 9\")       Current Pain Score: Moderate Pain (4)     BP Readings from Last 3 Encounters:   11/05/19 110/60   11/19/18 118/68   11/06/18 126/66      Wt Readings from Last 3 Encounters:   11/05/19 88 kg (194 lb)   11/19/18 83 kg (183 lb)   11/06/18 85.6 kg (188 lb 12.8 oz)      Estimated body mass index is 28.65 kg/m  as calculated from the following:    Height as of this encounter: 1.753 m (5' 9\").    Weight as of this encounter: 88 kg (194 lb).     Physical Exam  Vitals signs and nursing note reviewed.   Constitutional:       Appearance: Normal appearance.   Cardiovascular:      Rate and Rhythm: Normal rate.   Pulmonary:      Effort: Pulmonary effort is normal.   Musculoskeletal: Normal range of motion.         General: Tenderness present.      Comments: Full range of motion, no crepitus, no focal erythema, edema--- bilateral range of motion sensation, strength circulation intact  Tenderness with palpation above lateral left epicondyle   Skin:     General: Skin is warm and dry.   Neurological:      Mental Status: He is alert and oriented to person, place, and time.   Psychiatric:         Mood and Affect: Mood normal.         Behavior: Behavior normal.         Thought Content: Thought content normal.         Judgement: Judgment normal.         INVESTIGATIONS:  No results found for any visits on 11/05/19.    ASSESSMENT AND PLAN:  Problem List Items Addressed This Visit     None      Visit Diagnoses     Lateral epicondylitis of left elbow    -  Primary    Relevant Orders    OCCUPATIONAL THERAPY REFERRAL        History and exam consistent with lateral epicondylitis probably from overuse due to occupation  And playing hockey  We have discussed obtaining a basic x-ray to ensure joint integrity--- patient declines as he had to leave to  his son at school    Recommend trying occupational therapy--assisted with scheduling an appointment tomorrow with OT  If no improvement will return to clinic for further " investigation      -- Expected clinical course discussed    -- Medications and their side effects discussed    There are no Patient Instructions on file for this visit.  Fany Baig CNP  [facility]     Portions of this note were dictated using speech recognition software. The note has been proofread but errors in the text may have been overlooked. Please contact me if there are any concerns regarding the accuracy of the dictation.

## 2019-11-05 NOTE — NURSING NOTE
"Chief Complaint   Patient presents with     Elbow Pain     Pt present to clinic today for left elbow pain he has had for a month.  Initial /60 (BP Location: Right arm, Patient Position: Sitting, Cuff Size: Adult Large)   Pulse 84   Temp 96.1  F (35.6  C) (Tympanic)   Resp 16   Ht 1.753 m (5' 9\")   Wt 88 kg (194 lb)   BMI 28.65 kg/m   Estimated body mass index is 28.65 kg/m  as calculated from the following:    Height as of this encounter: 1.753 m (5' 9\").    Weight as of this encounter: 88 kg (194 lb).  Medication Reconciliation: complete    Marivel Graham LPN  "

## 2019-12-16 ENCOUNTER — HOSPITAL ENCOUNTER (OUTPATIENT)
Dept: OCCUPATIONAL THERAPY | Facility: OTHER | Age: 44
Setting detail: THERAPIES SERIES
End: 2019-12-16
Attending: NURSE PRACTITIONER
Payer: COMMERCIAL

## 2019-12-16 DIAGNOSIS — M77.12 LATERAL EPICONDYLITIS OF LEFT ELBOW: ICD-10-CM

## 2019-12-16 DIAGNOSIS — M25.522 PAIN OF BOTH ELBOWS: Primary | ICD-10-CM

## 2019-12-16 DIAGNOSIS — M25.521 PAIN OF BOTH ELBOWS: Primary | ICD-10-CM

## 2019-12-16 PROCEDURE — 97760 ORTHOTIC MGMT&TRAING 1ST ENC: CPT | Mod: GO

## 2019-12-16 PROCEDURE — 97110 THERAPEUTIC EXERCISES: CPT | Mod: GO

## 2019-12-16 PROCEDURE — 97140 MANUAL THERAPY 1/> REGIONS: CPT | Mod: GO,XU

## 2019-12-16 PROCEDURE — 97165 OT EVAL LOW COMPLEX 30 MIN: CPT | Mod: GO

## 2019-12-18 NOTE — PROGRESS NOTES
"   12/16/19 1500   Quick Adds   Type of Visit Initial Outpatient Occupational Therapy Evaluation   General Information   Start Of Care Date 12/16/19   Referring Physician RAJIV Murphy CNP   Orders Evaluate and treat as indicated   Medical Diagnosis bilteral lateral epicondylitis    Onset of Illness/Injury or Date of Surgery 11/01/19   Surgical/Medical History Reviewed Yes   Additional Occupational Profile Info/Pertinent History of Current Problem Patient is a 44 year old male who reports to OT with bilateral elbow pain.  He reports that it started in the left and that has since had some relief, but has started in the right even worse.   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     Role/Living Environment   Patient role/Employment history Employed   Patient/family Goals Statement \"Pain free elbows\"    Pain   Patient currently in pain Yes   Pain location right elbow    Pain rating 4/10   Pain description Ache   Pain comments mild pain in left elbow    Fall Risk Screen   Fall screen completed by OT   Have you fallen 2 or more times in the past year? No   Have you fallen and had an injury in the past year? No   Is patient a fall risk? No   Range of Motion (ROM)   ROM Comments WNL bilaterally    Hand Strength   Hand Dominance Right   Left Hand  (pounds) 145 pounds   Right Hand  (pounds) 90 pounds   Left Lateral Pinch (pounds) 25 pounds   Right Lateral Pinch (pounds) 25 pounds   Left Three Point Pinch (pounds) 15 pounds   Right Three Point Pinch (pounds) 15 pounds   Coordination   Coordination Comments Will test in further visits    Planned Therapy Interventions   Planned Therapy Interventions ADL training;IADL training;Coordination training;Joint mobilization;Manual therapy;Orthotic fitting/training;Self care/Home management;ROM;Strengthening;Stretching   Planned Modalities Ultrasound;Iontophoresis;Hot/cold packs;Electrical " stimulation   Intervention Comments 10% ketoprofen/4% dexamethasone as needed for pain/inflammation control    Adult OT Eval Goals   OT Eval Goals (Adult) 1;2;3;4    OT Goal 1   Goal Identifier STG 1    Goal Description Jemal will report improved ability to open a tight new jar from current rating of 4 (severe difficulty) to at least 2 (mild difficulty) as reported by the QuickDASH   Target Date 01/14/20    OT Goal 2   Goal Identifier STG 2    Goal Description Jemal will report 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    Target Date 01/29/20    OT Goal 3   Goal Identifier LTG 1   Goal Description Jemal will demonstrate objective improvements in strength up to 10# in order to return to PLOF    Target Date 02/12/20   OT Goal 4   Goal Identifier LTG 2    Goal Description Jemal will report no greater than 20% impairment, improving from current rating of 50% impairment on the QuickDASH in order to return to PLOF with ADL/IADL performance    Target Date 02/12/20   Clinical Impression   Criteria for Skilled Therapeutic Interventions Met Yes, treatment indicated   OT Diagnosis Decreased ADL/IADL and work performance   Influenced by the following impairments pain, decreased strength    Assessment of Occupational Performance 1-3 Performance Deficits   Identified Performance Deficits dressing, meal prep, work tasks    Clinical Decision Making (Complexity) Low complexity   Therapy Frequency 1-2x week    Predicted Duration of Therapy Intervention (days/wks) 8 weeks   Risks and Benefits of Treatment have been explained. Yes   Patient, Family & other staff in agreement with plan of care Yes   Clinical Impression Comments Jemal demonstrated knowledge and acceptance to POC.  He has good rehab potential.    Education Assessment   Barriers To Learning No Barriers   Preferred Learning Style Reading;Listening   Total Evaluation Time   OT Eval, Low Complexity Minutes (03812) 20

## 2019-12-18 NOTE — PROGRESS NOTES
[facility]          OUTPATIENT OCCUPATIONAL THERAPY  EVALUATION  PLAN OF TREATMENT FOR OUTPATIENT REHABILITATION  (COMPLETE FOR INITIAL CLAIMS ONLY)  Patient's Last Name, First Name, M.I.  YOB: 1975  Meliton Finnegan                        Provider's Name  [facility] Medical Record No.  [id number]                               Onset Date:     11/01/19   Start of Care Date:     12/16/19   Type:     ___PT   _X_OT   ___SLP Medical Diagnosis:     bilteral lateral epicondylitis                           OT Diagnosis:     Decreased ADL/IADL and work performance Visits from SOC:  1   _________________________________________________________________________________  Plan of Treatment/Functional Goals:  ADL training, IADL training, Coordination training, Joint mobilization, Manual therapy, Orthotic fitting/training, Self care/Home management, ROM, Strengthening, Stretching  Ultrasound, Iontophoresis, Hot/cold packs, Electrical stimulation  10% ketoprofen/4% dexamethasone as needed for pain/inflammation control               Goals  Goal Identifier: STG 1   Goal Description: Jemal will report improved ability to open a tight new jar from current rating of 4 (severe difficulty) to at least 2 (mild difficulty) as reported by the QuickDASH  Target Date: 01/14/20     Goal Identifier: STG 2   Goal Description: Jemal will report 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   Target Date: 01/29/20     Goal Identifier: LTG 1  Goal Description: Jemal will demonstrate objective improvements in strength up to 10# in order to return to PLOF   Target Date: 02/12/20     Goal Identifier: LTG 2   Goal Description: Jemal will report no greater than 20% impairment, improving from current rating of 50% impairment on the QuickDASH in order to return to PLOF  with ADL/IADL performance   Target Date: 02/12/20                                                          Therapy Frequency: 1-2x week      Predicted Duration of Therapy Intervention (days/wks): 8 weeks  Yani Silverman OT          I CERTIFY THE NEED FOR THESE SERVICES FURNISHED UNDER        THIS PLAN OF TREATMENT AND WHILE UNDER MY CARE     (Physician co-signature of this document indicates review and certification of the therapy plan).                 ,     ,                 Referring Physician: (P) RAJIV Murphy, CNP     Initial Assessment        See Epic Evaluation      Start Of Care Date: 12/16/19

## 2019-12-19 ENCOUNTER — HOSPITAL ENCOUNTER (OUTPATIENT)
Dept: OCCUPATIONAL THERAPY | Facility: OTHER | Age: 44
Setting detail: THERAPIES SERIES
End: 2019-12-19
Attending: FAMILY MEDICINE
Payer: COMMERCIAL

## 2019-12-19 DIAGNOSIS — M25.521 PAIN OF BOTH ELBOWS: ICD-10-CM

## 2019-12-19 DIAGNOSIS — M25.522 PAIN OF BOTH ELBOWS: ICD-10-CM

## 2019-12-19 PROCEDURE — 97035 APP MDLTY 1+ULTRASOUND EA 15: CPT | Mod: GO

## 2019-12-19 PROCEDURE — 97110 THERAPEUTIC EXERCISES: CPT | Mod: GO

## 2019-12-19 PROCEDURE — 97140 MANUAL THERAPY 1/> REGIONS: CPT | Mod: GO

## 2019-12-23 ENCOUNTER — APPOINTMENT (OUTPATIENT)
Dept: FAMILY MEDICINE | Facility: OTHER | Age: 44
End: 2019-12-23
Attending: CHIROPRACTOR

## 2019-12-23 PROCEDURE — 99499 UNLISTED E&M SERVICE: CPT | Performed by: CHIROPRACTOR

## 2019-12-24 ENCOUNTER — HOSPITAL ENCOUNTER (OUTPATIENT)
Dept: OCCUPATIONAL THERAPY | Facility: OTHER | Age: 44
Setting detail: THERAPIES SERIES
End: 2019-12-24
Attending: FAMILY MEDICINE
Payer: COMMERCIAL

## 2019-12-24 PROCEDURE — 97035 APP MDLTY 1+ULTRASOUND EA 15: CPT | Mod: GO

## 2019-12-24 PROCEDURE — 97140 MANUAL THERAPY 1/> REGIONS: CPT | Mod: GO

## 2019-12-24 PROCEDURE — 97110 THERAPEUTIC EXERCISES: CPT | Mod: GO

## 2019-12-27 ENCOUNTER — HOSPITAL ENCOUNTER (OUTPATIENT)
Dept: OCCUPATIONAL THERAPY | Facility: OTHER | Age: 44
Setting detail: THERAPIES SERIES
End: 2019-12-27
Attending: FAMILY MEDICINE
Payer: COMMERCIAL

## 2019-12-27 PROCEDURE — 97140 MANUAL THERAPY 1/> REGIONS: CPT | Mod: GO

## 2019-12-27 PROCEDURE — 97033 APP MDLTY 1+IONTPHRSIS EA 15: CPT | Mod: GO

## 2019-12-27 PROCEDURE — 97035 APP MDLTY 1+ULTRASOUND EA 15: CPT | Mod: GO

## 2019-12-27 PROCEDURE — 97110 THERAPEUTIC EXERCISES: CPT | Mod: GO

## 2019-12-30 ENCOUNTER — HOSPITAL ENCOUNTER (OUTPATIENT)
Dept: OCCUPATIONAL THERAPY | Facility: OTHER | Age: 44
Setting detail: THERAPIES SERIES
End: 2019-12-30
Attending: FAMILY MEDICINE
Payer: COMMERCIAL

## 2019-12-30 PROCEDURE — 97110 THERAPEUTIC EXERCISES: CPT | Mod: GO

## 2019-12-30 PROCEDURE — 97035 APP MDLTY 1+ULTRASOUND EA 15: CPT | Mod: GO

## 2019-12-30 PROCEDURE — 97033 APP MDLTY 1+IONTPHRSIS EA 15: CPT | Mod: GO

## 2019-12-30 PROCEDURE — 97140 MANUAL THERAPY 1/> REGIONS: CPT | Mod: GO

## 2020-01-29 NOTE — PROGRESS NOTES
"Outpatient Occupational Therapy Discharge Note     Patient: Meliton Finnegan  : 1975    Beginning/End Dates of Reporting Period:  2019 to 2020    Referring Provider: RAJIV Murphy CNP    Therapy Diagnosis: bilateral lateral epicondylitis    Client Self Report: \"It hurt again yesterday, but I did a lot of driving and plowing.\"      Objective Measurements:  None taken this date    Goals:     Goal Identifier STG 1    Goal Description Jemal will report improved ability to open a tight new jar from current rating of 4 (severe difficulty) to at least 2 (mild difficulty) as reported by the QuickDASH   Target Date 20   Date Met      Progress:     Goal Identifier STG 2    Goal Description Jemal will report 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    Target Date 20   Date Met      Progress:     Goal Identifier LTG 1   Goal Description Jemal will demonstrate objective improvements in strength up to 10# in order to return to PLOF    Target Date 20   Date Met      Progress:     Goal Identifier LTG 2    Goal Description Jemal will report no greater than 20% impairment, improving from current rating of 50% impairment on the QuickDASH in order to return to PLOF with ADL/IADL performance    Target Date 20   Date Met      Progress:         Progress Toward Goals:   Progress this reporting period: Jemal called reception and reported that he was feeling better and to cancel all remaining appointments.     Plan:  Discharge from therapy.    Discharge:    Reason for Discharge: Patient has met all goals.  Patient chooses to discontinue therapy.    Equipment Issued: right wrist cock up splint     Discharge Plan: Patient to continue home program.  "

## 2020-04-28 NOTE — PATIENT INSTRUCTIONS
Patient Information     Patient Name MRN Meliton Gurrola [id number] Male 1975      Patient Instructions by Mecca Martinez MD at 10/23/2017  2:22 PM     Author:  Mecca Martinez MD  Service:  (none) Author Type:  Physician     Filed:  10/23/2017  2:23 PM  Encounter Date:  10/23/2017 Status:  Addendum     :  Mecca Martinez MD (Physician)        Related Notes: Original Note by Mecca Martinez MD (Physician) filed at 10/23/2017  2:22 PM               Index Uzbek Related topics   Lactose Intolerance   ________________________________________________________________________  KEY POINTS    Lactose intolerance means you have trouble digesting milk and milk products. You may have bloating, nausea, cramps, or diarrhea after you have milk products.    Treatment may include avoiding milk products, taking lactase supplements when you eat or drink dairy products, or having smaller servings. The smaller the serving, the less likely it is you will have symptoms.  ________________________________________________________________________  What is lactose intolerance?   Lactose intolerance means you have trouble digesting milk and milk products. The condition is most common among Asians, American Indians, Jordanian Americans, and  Americans. It is also more common as people get older.  Lactose intolerance is not common in children, but may affect teens and adults.  What is the cause?   You have trouble digesting milk because your body cannot break down the natural sugar found in milk, called lactose. This normally happens as people get older.  Lactose intolerance is not the same as being allergic to milk. A milk allergy is a reaction by your immune system to cow's milk. Your body s immune system treats dairy products as though they are harmful. Your immune system tries to protect you by making chemicals that can cause a rash, itching, swelling, irritation, and tight muscles in your airways  [facility] last [facility] date for Valium as 2/28/20 for a 30 d/s. Last Visit: 7/8/19 with MD Manfred Castro  Next Appointment: 7/13/20 with MD Manfred Castro  Previous Refill Encounter(s): 2/27/20 #90    Requested Prescriptions     Pending Prescriptions Disp Refills    diazePAM (VALIUM) 10 mg tablet 90 Tab 0     Sig: Take 1 Tab by mouth every twelve (12) hours as needed for Anxiety. Max Daily Amount: 20 mg. "that make it hard for you to breathe.  What are the symptoms?   Symptoms may include:    Stomach cramps    Bloating and gas    Nausea and vomiting    Diarrhea  Symptoms may start 30 minutes to 2 hours after you eat or drink foods that contain lactose.   You may be able to drink small amounts of milk, especially if you eat other foods with it. Or you may not be able to drink any milk without having symptoms.   How is it diagnosed?  Your healthcare provider will ask about your symptoms and medical history and examine you. You may have tests to check for other problems.  How is it treated?  If you are very sensitive to lactose, check for ingredients on food labels and avoid any foods that include these ingredients:    Milk, milk by-products, or dry milk solids or powder    Ingredients that begin with \"lact\" such as lactose, lactate, lactoferrin, lactulose, lactalbumin, and lactic acid    Whey    Curds  Other changes you can make in your diet to help include:    Try nondairy milk, such as soy milk, almond milk, or rice milk.    Eat other foods that are rich in calcium, such as leafy greens (chico, kale, and mustard), canned salmon and sardines (if the bones are included), broccoli, and [place] sprouts. Also eat food that has been fortified with calcium, such orange juice, breads, and breakfast cereals.    Drink or eat smaller servings of milk products. The smaller the serving, the less likely it is you will have symptoms.    Eat other foods when you drink milk. This slows the digestive process and lessens symptoms. Most people can have 1/2 to 1 cup of milk with meals without having symptoms.    Look for lactose-reduced or lactose-free milk, such as Dairy Ease or Lactaid products, in your grocery store.    Try lactase supplements, such as Lactaid, that you can take when you eat or drink dairy products. The lactase in these products helps your body break down milk sugars. You can buy these supplements in drug or " grocery stores.    Eat yogurt that says on the label that it contains an active culture. The active cultures in yogurt help your body break down lactose.    Hard cheeses, such as cheddar, should not cause much of a problem because they don t have a lot of lactose. But be cautious about how much cheese you eat because it is high in calories and fat.  Lactose intolerance may get better during pregnancy. If you are pregnant, try small servings of milk several times a day. Starting with small servings may keep you from having symptoms. If you are unable to eat or drink any milk or dairy products, your healthcare provider may prescribe calcium tablets to make sure that you are getting enough calcium during your pregnancy.  How do I choose a calcium supplement?  Milk and milk products are an important source of calcium and vitamin D. It can be hard to get enough of these nutrients without dairy products in your diet. If you don t think you get enough calcium and vitamin D from the foods you drink or eat, ask your healthcare provider if you should take a supplement. There are many kinds of calcium supplements:    Calcium carbonate is best absorbed with a meal.    Calcium citrate can be taken on a full or empty stomach. This kind of calcium may be a better choice for older adults or people who have low levels of stomach acid.    Calcium phosphate, lactate, and gluconate are well absorbed, but you need to take several pills a day to meet your needs.  It is a good idea to choose a calcium supplement that contains vitamin D. Vitamin D helps your body absorb the calcium.  Developed by [employer].  Adult Advisor 2016.3 published by [employer].  Last modified: 2016-05-17  Last reviewed: 2015-01-02  This content is reviewed periodically and is subject to change as new health information becomes available. The information is intended to inform and educate and is not a replacement for medical evaluation, advice, diagnosis or  treatment by a healthcare professional.  References   Adult Advisor 2016.3 Index    Copyright   2016 [employer], a division of [employer] All rights reserved.

## 2020-06-25 ENCOUNTER — OFFICE VISIT (OUTPATIENT)
Dept: FAMILY MEDICINE | Facility: OTHER | Age: 45
End: 2020-06-25
Attending: FAMILY MEDICINE
Payer: COMMERCIAL

## 2020-06-25 VITALS
HEART RATE: 56 BPM | SYSTOLIC BLOOD PRESSURE: 120 MMHG | TEMPERATURE: 98 F | BODY MASS INDEX: 28.91 KG/M2 | OXYGEN SATURATION: 98 % | DIASTOLIC BLOOD PRESSURE: 66 MMHG | WEIGHT: 195.8 LBS | RESPIRATION RATE: 14 BRPM

## 2020-06-25 DIAGNOSIS — E55.9 VITAMIN D DEFICIENCY: Primary | ICD-10-CM

## 2020-06-25 LAB
DEPRECATED CALCIDIOL+CALCIFEROL SERPL-MC: 28.7 NG/ML
TESTOST SERPL-MCNC: 441 NG/DL (ref 175–781)

## 2020-06-25 PROCEDURE — 99213 OFFICE O/P EST LOW 20 MIN: CPT | Performed by: FAMILY MEDICINE

## 2020-06-25 PROCEDURE — 84403 ASSAY OF TOTAL TESTOSTERONE: CPT | Mod: ZL | Performed by: FAMILY MEDICINE

## 2020-06-25 PROCEDURE — 36415 COLL VENOUS BLD VENIPUNCTURE: CPT | Mod: ZL | Performed by: FAMILY MEDICINE

## 2020-06-25 PROCEDURE — 82306 VITAMIN D 25 HYDROXY: CPT | Mod: ZL | Performed by: FAMILY MEDICINE

## 2020-06-25 ASSESSMENT — ANXIETY QUESTIONNAIRES
7. FEELING AFRAID AS IF SOMETHING AWFUL MIGHT HAPPEN: NOT AT ALL
5. BEING SO RESTLESS THAT IT IS HARD TO SIT STILL: NOT AT ALL
GAD7 TOTAL SCORE: 0
IF YOU CHECKED OFF ANY PROBLEMS ON THIS QUESTIONNAIRE, HOW DIFFICULT HAVE THESE PROBLEMS MADE IT FOR YOU TO DO YOUR WORK, TAKE CARE OF THINGS AT HOME, OR GET ALONG WITH OTHER PEOPLE: NOT DIFFICULT AT ALL
1. FEELING NERVOUS, ANXIOUS, OR ON EDGE: NOT AT ALL
6. BECOMING EASILY ANNOYED OR IRRITABLE: NOT AT ALL
2. NOT BEING ABLE TO STOP OR CONTROL WORRYING: NOT AT ALL
3. WORRYING TOO MUCH ABOUT DIFFERENT THINGS: NOT AT ALL

## 2020-06-25 ASSESSMENT — ENCOUNTER SYMPTOMS
COLOR CHANGE: 0
DYSPHORIC MOOD: 1
FATIGUE: 0

## 2020-06-25 ASSESSMENT — PATIENT HEALTH QUESTIONNAIRE - PHQ9: 5. POOR APPETITE OR OVEREATING: NOT AT ALL

## 2020-06-25 ASSESSMENT — PAIN SCALES - GENERAL: PAINLEVEL: NO PAIN (0)

## 2020-06-25 NOTE — LETTER
June 26, 2020      Meliton Finnegan  [address] [place] [address]        Dear Meliton,       The labs are good.  The vitamin D is considered in the normal range.  If you want, taking 1000 units supplement daily (is over the counter) would be safe and might help a little.  We now consider 20 and lower to be abnormal.  Previously it was 30 and under.    Results for orders placed or performed in visit on 06/25/20   Vitamin D Total     Status: None   Result Value Ref Range    Vitamin D Total 28.7 ng/mL   Testosterone, Total     Status: None   Result Value Ref Range    Testosterone Total 441 175 - 781 ng/dL         Sincerely,        Tolu Mccarty MD

## 2020-06-25 NOTE — PROGRESS NOTES
SUBJECTIVE:   Meliton Finnegan is a 45 year old male who presents to clinic today for the following health issues:    HPI    Fatigue, sluggishness and tired overall.  He has been working solidly though the COVID lock down. He had bought a home and was living with a woman who just moved out recently.  He initiated it but this made him feel a bit depressed, so got in with his counselor.  Had low vitamin D in the past, so would like this checked again.  Is in the sun often.  He has not had testocerone checked, erections are not as often or has firm as in the past.     Patient Active Problem List    Diagnosis Date Noted     Seasonal affective disorder (H) 11/19/2018     Priority: Medium     Hypercholesterolemia 01/30/2018     Priority: Medium     Overview:   mild       Venereal wart 01/30/2018     Priority: Medium     Epigastric pain 12/06/2017     Priority: Medium     Partial tear of left subscapularis tendon, initial encounter 05/02/2017     Priority: Medium     Adjustment disorder with anxious mood 01/11/2017     Priority: Medium     BPPV (benign paroxysmal positional vertigo) 11/15/2016     Priority: Medium     S/P shoulder surgery 10/26/2016     Priority: Medium     Overview:   Bilateral       Subluxation of tendon of long head of biceps 05/17/2016     Priority: Medium     Labral tear of shoulder, right, initial encounter 05/17/2016     Priority: Medium     Incomplete tear of right rotator cuff 05/17/2016     Priority: Medium     Acromioclavicular joint arthritis 05/17/2016     Priority: Medium     Gastroesophageal reflux disease without esophagitis 12/14/2015     Priority: Medium     Past Surgical History:   Procedure Laterality Date     ESOPHAGOSCOPY, GASTROSCOPY, DUODENOSCOPY (EGD), COMBINED      12/7/2017     EXTRACTION(S) DENTAL      under anesthesia     FINGER SURGERY      Tendon repair, right fifth finger, under anesthesia     OTHER SURGICAL HISTORY      38257,OUTER EAR SURGERY,Left ear surgery for  cartilage reconstruction     OTHER SURGICAL HISTORY      2017,[id number],SHOULDER SURGERY,Bilateral     VASECTOMY      No Comments Provided     VASECTOMY      reversal scheduled for 12/06     Social History     Tobacco Use     Smoking status: Never Smoker     Smokeless tobacco: Former User     Types: Chew   Substance Use Topics     Alcohol use: Yes     Comment: Alcoholic Drinks/day: Occasional     Current Outpatient Medications   Medication Sig Dispense Refill     citalopram (CELEXA) 20 MG tablet Take 1 tablet (20 mg) by mouth daily (Patient not taking: Reported on 11/5/2019) 90 tablet 3     No Known Allergies    Review of Systems   Constitutional: Negative for fatigue.   Skin: Negative for color change.   Psychiatric/Behavioral: Positive for dysphoric mood and mood changes.        OBJECTIVE:     /66   Pulse 56   Temp 98  F (36.7  C)   Resp 14   Wt 88.8 kg (195 lb 12.8 oz)   SpO2 98%   BMI 28.91 kg/m    Body mass index is 28.91 kg/m .  Physical Exam  Constitutional:       Appearance: Normal appearance.   Neurological:      General: No focal deficit present.      Mental Status: He is alert and oriented to person, place, and time.   Psychiatric:         Mood and Affect: Mood normal.         Behavior: Behavior normal.         Thought Content: Thought content normal.       Results for orders placed or performed in visit on 06/25/20   Vitamin D Total     Status: None   Result Value Ref Range    Vitamin D Total 28.7 ng/mL   Testosterone, Total     Status: None   Result Value Ref Range    Testosterone Total 441 175 - 781 ng/dL           ASSESSMENT/PLAN:         (E55.9) Vitamin D deficiency  (primary encounter diagnosis)  Comment: I suspect the fatigue is more of an adjustment disorder with mildly anxious mood.    Plan: Testosterone, Total, Vitamin D Total        Follow up as needed         Tolu Mccarty MD  [facility] AND [facility]

## 2020-06-25 NOTE — NURSING NOTE
"Coming in for labs for, sluggish tired    Chief Complaint   Patient presents with     Fatigue     sluggish tired, ? low vitamin D       Initial /66   Pulse 56   Temp 98  F (36.7  C)   Resp 14   Wt 88.8 kg (195 lb 12.8 oz)   SpO2 98%   BMI 28.91 kg/m   Estimated body mass index is 28.91 kg/m  as calculated from the following:    Height as of 11/5/19: 1.753 m (5' 9\").    Weight as of this encounter: 88.8 kg (195 lb 12.8 oz).  Medication Reconciliation: complete    Kim Leroy LPN  "

## 2020-06-26 ASSESSMENT — ANXIETY QUESTIONNAIRES: GAD7 TOTAL SCORE: 0

## 2020-08-19 ENCOUNTER — OFFICE VISIT (OUTPATIENT)
Dept: FAMILY MEDICINE | Facility: OTHER | Age: 45
End: 2020-08-19
Attending: FAMILY MEDICINE
Payer: COMMERCIAL

## 2020-08-19 VITALS
SYSTOLIC BLOOD PRESSURE: 118 MMHG | TEMPERATURE: 97.1 F | DIASTOLIC BLOOD PRESSURE: 66 MMHG | RESPIRATION RATE: 14 BRPM | BODY MASS INDEX: 27.76 KG/M2 | OXYGEN SATURATION: 98 % | WEIGHT: 188 LBS | HEART RATE: 66 BPM

## 2020-08-19 DIAGNOSIS — E78.00 HYPERCHOLESTEROLEMIA: ICD-10-CM

## 2020-08-19 DIAGNOSIS — F43.22 ADJUSTMENT DISORDER WITH ANXIOUS MOOD: Primary | ICD-10-CM

## 2020-08-19 DIAGNOSIS — Z23 NEED FOR VIRAL IMMUNIZATION: ICD-10-CM

## 2020-08-19 PROCEDURE — 99213 OFFICE O/P EST LOW 20 MIN: CPT | Mod: 25 | Performed by: FAMILY MEDICINE

## 2020-08-19 PROCEDURE — 90471 IMMUNIZATION ADMIN: CPT | Performed by: FAMILY MEDICINE

## 2020-08-19 PROCEDURE — 90715 TDAP VACCINE 7 YRS/> IM: CPT | Performed by: FAMILY MEDICINE

## 2020-08-19 ASSESSMENT — ANXIETY QUESTIONNAIRES
1. FEELING NERVOUS, ANXIOUS, OR ON EDGE: MORE THAN HALF THE DAYS
3. WORRYING TOO MUCH ABOUT DIFFERENT THINGS: SEVERAL DAYS
6. BECOMING EASILY ANNOYED OR IRRITABLE: SEVERAL DAYS
5. BEING SO RESTLESS THAT IT IS HARD TO SIT STILL: NOT AT ALL
GAD7 TOTAL SCORE: 7
IF YOU CHECKED OFF ANY PROBLEMS ON THIS QUESTIONNAIRE, HOW DIFFICULT HAVE THESE PROBLEMS MADE IT FOR YOU TO DO YOUR WORK, TAKE CARE OF THINGS AT HOME, OR GET ALONG WITH OTHER PEOPLE: SOMEWHAT DIFFICULT
7. FEELING AFRAID AS IF SOMETHING AWFUL MIGHT HAPPEN: SEVERAL DAYS
2. NOT BEING ABLE TO STOP OR CONTROL WORRYING: SEVERAL DAYS

## 2020-08-19 ASSESSMENT — PATIENT HEALTH QUESTIONNAIRE - PHQ9
5. POOR APPETITE OR OVEREATING: SEVERAL DAYS
SUM OF ALL RESPONSES TO PHQ QUESTIONS 1-9: 4

## 2020-08-19 ASSESSMENT — PAIN SCALES - GENERAL: PAINLEVEL: MODERATE PAIN (5)

## 2020-08-19 NOTE — NURSING NOTE
"coming in with chronic depression and anxiety    Chief Complaint   Patient presents with     Fatigue     sore back.fogged brain, sore muscles, physical feeling       Initial /66   Pulse 66   Temp 97.1  F (36.2  C)   Resp 14   Wt 85.3 kg (188 lb)   SpO2 98%   BMI 27.76 kg/m   Estimated body mass index is 27.76 kg/m  as calculated from the following:    Height as of 11/5/19: 1.753 m (5' 9\").    Weight as of this encounter: 85.3 kg (188 lb).  Medication Reconciliation: complete    Kim Leroy LPN  "

## 2020-08-19 NOTE — PROGRESS NOTES
"  SUBJECTIVE:   Meliton Finnegan is a 45 year old male who presents to clinic today for the following health issues:    HPI  Fatigue.  Feels \"fogged thinking\" and diffuse aching.  Feels it is actually depression.  Has had years of symptoms off and on.  Just got a promotion at work and is back with his old girlfriend, she has moved back in with him.  Eating healthy and exercising regularly, but still simply feels fatigued.  His girlfriend says he smells differently before he has a bad day.  Wellbutrin, prozac, zoloft have not worked for him.  Side effects.  Has tried supplements Tumeric among others.  [place]'s Wort.  None of this has really helped much.  Not refreshed in the morning.  No sleep apnea symptoms. Testosterone in June was normal.  Much more anxiety over the past 3 weeks.  Has researched all of this a lot and wondering about nefazadone.      PHQ-2 Score:     PHQ-2 ( 1999 Pfizer) 6/25/2020 5/10/2018   Q1: Little interest or pleasure in doing things 0 0   Q2: Feeling down, depressed or hopeless 0 0   PHQ-2 Score 0 0           Past Medical History:   Diagnosis Date     Epigastric pain     12/6/2017      Past Surgical History:   Procedure Laterality Date     ESOPHAGOSCOPY, GASTROSCOPY, DUODENOSCOPY (EGD), COMBINED      12/7/2017     EXTRACTION(S) DENTAL      under anesthesia     FINGER SURGERY      Tendon repair, right fifth finger, under anesthesia     OTHER SURGICAL HISTORY      38666,OUTER EAR SURGERY,Left ear surgery for cartilage reconstruction     OTHER SURGICAL HISTORY      2017,[id number],SHOULDER SURGERY,Bilateral     VASECTOMY      No Comments Provided     VASECTOMY      reversal scheduled for 12/06     Social History     Tobacco Use     Smoking status: Never Smoker     Smokeless tobacco: Former User     Types: Chew   Substance Use Topics     Alcohol use: Yes     Comment: Alcoholic Drinks/day: Occasional     Current Outpatient Medications   Medication Sig Dispense Refill     nefazodone (SERZONE) 100 " MG tablet Take 1 tablet (100 mg) by mouth 2 times daily 180 tablet 3     No Known Allergies    Review of Systems   Constitutional: Positive for fatigue. Negative for unexpected weight change.   Gastrointestinal: Negative for anal bleeding and hematochezia.   Psychiatric/Behavioral: Positive for dysphoric mood. The patient is nervous/anxious.         OBJECTIVE:     /66   Pulse 66   Temp 97.1  F (36.2  C)   Resp 14   Wt 85.3 kg (188 lb)   SpO2 98%   BMI 27.76 kg/m    Body mass index is 27.76 kg/m .  Physical Exam  Constitutional:       Appearance: Normal appearance.   Neurological:      General: No focal deficit present.      Mental Status: He is alert and oriented to person, place, and time.   Psychiatric:         Mood and Affect: Mood normal.         Behavior: Behavior normal.         Thought Content: Thought content normal.         Diagnostic Test Results:  none     ASSESSMENT/PLAN:         (F43.22) Adjustment disorder with anxious mood  (primary encounter diagnosis)  Comment: this is a tough situation in that all of his lab testing has been normal, he has been actively exercising, met with his counselor, reconciled with GF.  Still he reports significant fatigue. My sense is it is an anxiety disorder, likely LEANA actually. He has significant side effects worries, which is also typical of LEANA.  Requests Serzone, which seems reasonable to try next.  Follow up in 4-6 weeks as needed   Plan: nefazodone (SERZONE) 100 MG tablet                  (Z23) Need for viral immunization  Comment:    Plan: TDAP VACCINE                 Tolu Mccarty MD  [facility] AND [facility]

## 2020-08-20 ASSESSMENT — ANXIETY QUESTIONNAIRES: GAD7 TOTAL SCORE: 7

## 2020-08-21 ASSESSMENT — ENCOUNTER SYMPTOMS
FATIGUE: 1
HEMATOCHEZIA: 0
DYSPHORIC MOOD: 1
ANAL BLEEDING: 0
UNEXPECTED WEIGHT CHANGE: 0
NERVOUS/ANXIOUS: 1

## 2020-09-14 ENCOUNTER — OFFICE VISIT (OUTPATIENT)
Dept: FAMILY MEDICINE | Facility: OTHER | Age: 45
End: 2020-09-14
Attending: FAMILY MEDICINE
Payer: COMMERCIAL

## 2020-09-14 VITALS
RESPIRATION RATE: 16 BRPM | TEMPERATURE: 96.6 F | BODY MASS INDEX: 29.03 KG/M2 | DIASTOLIC BLOOD PRESSURE: 70 MMHG | HEART RATE: 61 BPM | OXYGEN SATURATION: 97 % | SYSTOLIC BLOOD PRESSURE: 122 MMHG | WEIGHT: 196.6 LBS

## 2020-09-14 DIAGNOSIS — F43.22 ADJUSTMENT DISORDER WITH ANXIOUS MOOD: ICD-10-CM

## 2020-09-14 DIAGNOSIS — E78.00 HYPERCHOLESTEROLEMIA: Primary | ICD-10-CM

## 2020-09-14 PROCEDURE — 99213 OFFICE O/P EST LOW 20 MIN: CPT | Performed by: FAMILY MEDICINE

## 2020-09-14 RX ORDER — NEFAZODONE HYDROCHLORIDE 200 MG/1
200 TABLET ORAL 2 TIMES DAILY
Qty: 180 TABLET | Refills: 3 | Status: SHIPPED | OUTPATIENT
Start: 2020-09-14 | End: 2020-10-08

## 2020-09-14 ASSESSMENT — ANXIETY QUESTIONNAIRES
5. BEING SO RESTLESS THAT IT IS HARD TO SIT STILL: NOT AT ALL
1. FEELING NERVOUS, ANXIOUS, OR ON EDGE: SEVERAL DAYS
IF YOU CHECKED OFF ANY PROBLEMS ON THIS QUESTIONNAIRE, HOW DIFFICULT HAVE THESE PROBLEMS MADE IT FOR YOU TO DO YOUR WORK, TAKE CARE OF THINGS AT HOME, OR GET ALONG WITH OTHER PEOPLE: SOMEWHAT DIFFICULT
6. BECOMING EASILY ANNOYED OR IRRITABLE: SEVERAL DAYS
3. WORRYING TOO MUCH ABOUT DIFFERENT THINGS: SEVERAL DAYS
7. FEELING AFRAID AS IF SOMETHING AWFUL MIGHT HAPPEN: SEVERAL DAYS
GAD7 TOTAL SCORE: 6
2. NOT BEING ABLE TO STOP OR CONTROL WORRYING: SEVERAL DAYS

## 2020-09-14 ASSESSMENT — PATIENT HEALTH QUESTIONNAIRE - PHQ9
5. POOR APPETITE OR OVEREATING: SEVERAL DAYS
SUM OF ALL RESPONSES TO PHQ QUESTIONS 1-9: 8

## 2020-09-14 ASSESSMENT — ENCOUNTER SYMPTOMS
SLEEP DISTURBANCE: 1
DYSPHORIC MOOD: 1
FATIGUE: 1
NERVOUS/ANXIOUS: 1
BACK PAIN: 1

## 2020-09-14 ASSESSMENT — PAIN SCALES - GENERAL: PAINLEVEL: MODERATE PAIN (5)

## 2020-09-14 NOTE — PROGRESS NOTES
"  SUBJECTIVE:   Meliton Finnegan is a 45 year old male who presents to clinic today for the following health issues:    HPI  Follow up on depression meds. He was on 100 milligram twice daily, and after 5 days he was not doing good so increased to 200 milligram twice daily.  Had a few days of \"pretty good\" but seemed to have more days of \"not good\".  Anxious several days, \"dull\".  GF has told him he is a little more agitated.  In retrospect he is thinking he has had low grade depression his whole life.  Was using more EtoH in the past to self medicate. Now perhaps one drink a week only.  He had a good weekend hunting with his son on [address].  Enjoyed this. That night he did not sleep well at all.  Perhaps an hour.  Worrying.  Last night used melatonin and slept well.   He feels counseling has not really helped with any of this.      PHQ-2 Score:     PHQ-2 ( 1999 Pfizer) 6/25/2020 5/10/2018   Q1: Little interest or pleasure in doing things 0 0   Q2: Feeling down, depressed or hopeless 0 0   PHQ-2 Score 0 0     LEANA-7 SCORE 6/25/2020 8/19/2020 9/14/2020   Total Score 0 7 6     With this he has low back pain, that seems to be interrelated.      Past Medical History:   Diagnosis Date     Epigastric pain     12/6/2017      Past Surgical History:   Procedure Laterality Date     ESOPHAGOSCOPY, GASTROSCOPY, DUODENOSCOPY (EGD), COMBINED      12/7/2017     EXTRACTION(S) DENTAL      under anesthesia     FINGER SURGERY      Tendon repair, right fifth finger, under anesthesia     OTHER SURGICAL HISTORY      85702,OUTER EAR SURGERY,Left ear surgery for cartilage reconstruction     OTHER SURGICAL HISTORY      2017,IIP443,SHOULDER SURGERY,Bilateral     VASECTOMY      No Comments Provided     VASECTOMY      reversal scheduled for 12/06     Social History     Tobacco Use     Smoking status: Never Smoker     Smokeless tobacco: Former User     Types: Chew   Substance Use Topics     Alcohol use: Yes     Comment: Alcoholic Drinks/day: " Occasional     Current Outpatient Medications   Medication Sig Dispense Refill     nefazodone (SERZONE) 100 MG tablet Take 1 tablet (100 mg) by mouth 2 times daily 180 tablet 3     No Known Allergies    Review of Systems   Constitutional: Positive for fatigue.   Musculoskeletal: Positive for back pain.   Psychiatric/Behavioral: Positive for dysphoric mood and sleep disturbance. The patient is nervous/anxious.         OBJECTIVE:     /70   Pulse 61   Temp 96.6  F (35.9  C)   Resp 16   Wt 89.2 kg (196 lb 9.6 oz)   SpO2 97%   BMI 29.03 kg/m    Body mass index is 29.03 kg/m .  Physical Exam  Constitutional:       Appearance: Normal appearance.   Neurological:      General: No focal deficit present.      Mental Status: He is alert and oriented to person, place, and time.   Psychiatric:         Mood and Affect: Mood normal.         Behavior: Behavior normal.         Thought Content: Thought content normal.             ASSESSMENT/PLAN:             (F43.22) Adjustment disorder with anxious mood  Comment: it is possible he has ADD as well, trouble paying attention for his whole life.  Discussed with him logistics of stimulants and he does not want this.  Is still in the window for side effects from serzone and it is common to not yet feel it working fully.  For now, would give it another 2-3 weeks, if no improvement then add on Wellbutrin again.    Plan: contact me in 2-3 weeks as needed       Tolu Mccarty MD  [facility] AND [facility]

## 2020-09-14 NOTE — NURSING NOTE
"Coming in for a medication check up    Increased the medication to 200mg, bid    Chief Complaint   Patient presents with     Recheck Medication     Depression       Initial /70   Pulse 61   Temp 96.6  F (35.9  C)   Resp 16   Wt 89.2 kg (196 lb 9.6 oz)   SpO2 97%   BMI 29.03 kg/m   Estimated body mass index is 29.03 kg/m  as calculated from the following:    Height as of 11/5/19: 1.753 m (5' 9\").    Weight as of this encounter: 89.2 kg (196 lb 9.6 oz).  Medication Reconciliation: complete    Kim Leroy LPN  "

## 2020-09-15 ASSESSMENT — ANXIETY QUESTIONNAIRES: GAD7 TOTAL SCORE: 6

## 2020-09-25 ENCOUNTER — MYC MEDICAL ADVICE (OUTPATIENT)
Dept: FAMILY MEDICINE | Facility: OTHER | Age: 45
End: 2020-09-25

## 2020-10-07 ENCOUNTER — MYC MEDICAL ADVICE (OUTPATIENT)
Dept: FAMILY MEDICINE | Facility: OTHER | Age: 45
End: 2020-10-07

## 2020-10-07 DIAGNOSIS — F43.22 ADJUSTMENT DISORDER WITH ANXIOUS MOOD: Primary | ICD-10-CM

## 2020-10-08 ENCOUNTER — MYC MEDICAL ADVICE (OUTPATIENT)
Dept: FAMILY MEDICINE | Facility: OTHER | Age: 45
End: 2020-10-08

## 2020-10-08 RX ORDER — NEFAZODONE HYDROCHLORIDE 250 MG/1
250 TABLET ORAL 2 TIMES DAILY
Qty: 180 TABLET | Refills: 3 | Status: SHIPPED | OUTPATIENT
Start: 2020-10-08 | End: 2020-12-24

## 2020-10-30 ENCOUNTER — MYC MEDICAL ADVICE (OUTPATIENT)
Dept: FAMILY MEDICINE | Facility: OTHER | Age: 45
End: 2020-10-30

## 2020-11-04 ENCOUNTER — MYC MEDICAL ADVICE (OUTPATIENT)
Dept: FAMILY MEDICINE | Facility: OTHER | Age: 45
End: 2020-11-04

## 2020-11-11 ENCOUNTER — MYC MEDICAL ADVICE (OUTPATIENT)
Dept: FAMILY MEDICINE | Facility: OTHER | Age: 45
End: 2020-11-11

## 2020-11-12 ENCOUNTER — MYC MEDICAL ADVICE (OUTPATIENT)
Dept: FAMILY MEDICINE | Facility: OTHER | Age: 45
End: 2020-11-12

## 2020-11-22 ENCOUNTER — MYC MEDICAL ADVICE (OUTPATIENT)
Dept: FAMILY MEDICINE | Facility: OTHER | Age: 45
End: 2020-11-22

## 2020-11-23 ENCOUNTER — MYC MEDICAL ADVICE (OUTPATIENT)
Dept: FAMILY MEDICINE | Facility: OTHER | Age: 45
End: 2020-11-23

## 2020-11-23 DIAGNOSIS — F33.8 SEASONAL AFFECTIVE DISORDER (H): Primary | ICD-10-CM

## 2020-11-23 RX ORDER — CITALOPRAM HYDROBROMIDE 40 MG/1
40 TABLET ORAL DAILY
Qty: 90 TABLET | Refills: 0 | Status: SHIPPED | OUTPATIENT
Start: 2020-11-23 | End: 2021-06-15

## 2020-11-23 NOTE — TELEPHONE ENCOUNTER
Patient is requesting refill be sent in for 40 mg citalopram. Order pended.     Grace Pope LPN on 11/23/2020 at 10:10 AM

## 2020-12-24 ENCOUNTER — MYC MEDICAL ADVICE (OUTPATIENT)
Dept: FAMILY MEDICINE | Facility: OTHER | Age: 45
End: 2020-12-24

## 2020-12-24 DIAGNOSIS — F33.8 SEASONAL AFFECTIVE DISORDER (H): Primary | ICD-10-CM

## 2020-12-24 RX ORDER — BUPROPION HYDROCHLORIDE 300 MG/1
300 TABLET ORAL EVERY MORNING
Qty: 90 TABLET | Refills: 3 | Status: SHIPPED | OUTPATIENT
Start: 2020-12-24 | End: 2021-03-31

## 2021-01-03 ENCOUNTER — HEALTH MAINTENANCE LETTER (OUTPATIENT)
Age: 46
End: 2021-01-03

## 2021-01-13 ENCOUNTER — MYC MEDICAL ADVICE (OUTPATIENT)
Dept: FAMILY MEDICINE | Facility: OTHER | Age: 46
End: 2021-01-13

## 2021-01-13 DIAGNOSIS — F33.8 SEASONAL AFFECTIVE DISORDER (H): Primary | ICD-10-CM

## 2021-01-13 RX ORDER — ARIPIPRAZOLE 5 MG/1
5 TABLET ORAL DAILY
Qty: 90 TABLET | Refills: 3 | Status: SHIPPED | OUTPATIENT
Start: 2021-01-13 | End: 2021-03-03

## 2021-01-14 ENCOUNTER — MYC MEDICAL ADVICE (OUTPATIENT)
Dept: FAMILY MEDICINE | Facility: OTHER | Age: 46
End: 2021-01-14

## 2021-01-28 ENCOUNTER — MYC MEDICAL ADVICE (OUTPATIENT)
Dept: FAMILY MEDICINE | Facility: OTHER | Age: 46
End: 2021-01-28

## 2021-03-03 ENCOUNTER — MYC MEDICAL ADVICE (OUTPATIENT)
Dept: FAMILY MEDICINE | Facility: OTHER | Age: 46
End: 2021-03-03

## 2021-03-09 ENCOUNTER — MYC MEDICAL ADVICE (OUTPATIENT)
Dept: FAMILY MEDICINE | Facility: OTHER | Age: 46
End: 2021-03-09

## 2021-03-09 DIAGNOSIS — F43.22 ADJUSTMENT DISORDER WITH ANXIOUS MOOD: Primary | ICD-10-CM

## 2021-03-31 ENCOUNTER — OFFICE VISIT (OUTPATIENT)
Dept: PSYCHIATRY | Facility: OTHER | Age: 46
End: 2021-03-31
Attending: FAMILY MEDICINE
Payer: COMMERCIAL

## 2021-03-31 VITALS
BODY MASS INDEX: 30.07 KG/M2 | WEIGHT: 203 LBS | DIASTOLIC BLOOD PRESSURE: 80 MMHG | SYSTOLIC BLOOD PRESSURE: 122 MMHG | TEMPERATURE: 96.9 F | RESPIRATION RATE: 18 BRPM | HEART RATE: 77 BPM | HEIGHT: 69 IN | OXYGEN SATURATION: 99 %

## 2021-03-31 DIAGNOSIS — F43.22 ADJUSTMENT DISORDER WITH ANXIOUS MOOD: ICD-10-CM

## 2021-03-31 DIAGNOSIS — F32.A DEPRESSION, UNSPECIFIED DEPRESSION TYPE: Primary | ICD-10-CM

## 2021-03-31 PROCEDURE — 99205 OFFICE O/P NEW HI 60 MIN: CPT | Performed by: PSYCHIATRY & NEUROLOGY

## 2021-03-31 ASSESSMENT — ANXIETY QUESTIONNAIRES
6. BECOMING EASILY ANNOYED OR IRRITABLE: SEVERAL DAYS
3. WORRYING TOO MUCH ABOUT DIFFERENT THINGS: NOT AT ALL
GAD7 TOTAL SCORE: 2
IF YOU CHECKED OFF ANY PROBLEMS ON THIS QUESTIONNAIRE, HOW DIFFICULT HAVE THESE PROBLEMS MADE IT FOR YOU TO DO YOUR WORK, TAKE CARE OF THINGS AT HOME, OR GET ALONG WITH OTHER PEOPLE: SOMEWHAT DIFFICULT
7. FEELING AFRAID AS IF SOMETHING AWFUL MIGHT HAPPEN: NOT AT ALL
2. NOT BEING ABLE TO STOP OR CONTROL WORRYING: NOT AT ALL
1. FEELING NERVOUS, ANXIOUS, OR ON EDGE: NOT AT ALL
5. BEING SO RESTLESS THAT IT IS HARD TO SIT STILL: NOT AT ALL

## 2021-03-31 ASSESSMENT — PATIENT HEALTH QUESTIONNAIRE - PHQ9
SUM OF ALL RESPONSES TO PHQ QUESTIONS 1-9: 7
5. POOR APPETITE OR OVEREATING: SEVERAL DAYS

## 2021-03-31 ASSESSMENT — PAIN SCALES - GENERAL: PAINLEVEL: NO PAIN (0)

## 2021-03-31 ASSESSMENT — MIFFLIN-ST. JEOR: SCORE: 1801.18

## 2021-03-31 NOTE — PATIENT INSTRUCTIONS
trintellix 10 mg:  Once a day, am or pm  Take with some food first few times  Start with 1/2 pill for 2-4+ days, then increase to 1 pill for at least 2 weeks,  If not starting to feel better, increase to 2 pills once a day

## 2021-03-31 NOTE — PROGRESS NOTES
"Outpatient Psychiatric Diagnostic Evaluation    Name: Meliton Finnegan      : 1975   Date: 3/31/2021    Source of Referral:  Tolu Mccarty MD    Identifying Data:  This is a 46-year-old man seen for psychiatric diagnostic assessment and treatment of depression with anxiety    Chief Complaint:   Patient presents with:  Anxiety  Depression     Referred by his PCP for psychiatric evaluation and treatment    HPI:  Patient reports that he has been \"battling depression\" essentially every day for a couple years.  He notes that in 2020 he \"took a turn for the worse\".  He has been getting treatment with citalopram 40 mg a day, but remains significantly impaired.  He does note that the citalopram has been helpful with anxiety but not so much for the depression.    Psychiatric Review of Symptoms:  Depressed mood, anxiety, trouble concentrating, decreased motivation, decreased energy, gaining weight over the past approximately 18 months, decreased interest in usual enjoyable activities, difficulty keeping up with daily responsibilities, occasional passive suicidal ideation, forgetful, trouble following conversations    Psychiatric History:  Patient reports that he may have been depressed as early as age 20, but did not get any treatment at that time.  He reports that he got better through willpower and perseverance and did reasonably well until approximately his mid 30s.  At that time he again began to experience depression and also thinks there may have been a seasonal component to it.  He has been tried on a number of psychiatric medications including, but not limited to, the following: Wellbutrin, Prozac, Zoloft, nefazodone, Abilify.  He reports sexual side effects to all the serotonin medications.  He has never been psychiatrically hospitalized or made a suicide attempt.  Also reports that he had problems in school learning and paying attention.  He was never assessed or treated for attention " "problems.    Chemical Use History:    Patient reports problems with alcohol use in the past, including blackouts, but reports that he has cut way back on his drinking and does not have any recent problems.  He denies any problems with recreational drugs.    Past Medical History:  Past Medical History:   Diagnosis Date     Epigastric pain     12/6/2017      Current psychiatric medications:  Citalopram 40 mg a day      Family History:    He reports that his mother has problems with \"high anxiety\", but has never sought treatment.  He is not aware of any other family history of psychiatric disorders or attention problems      Social History:  Social History     Socioeconomic History     Marital status:      Spouse name: Not on file     Number of children: Not on file     Years of education: Not on file     Highest education level: Not on file   Occupational History     Not on file   Social Needs     Financial resource strain: Not on file     Food insecurity     Worry: Not on file     Inability: Not on file     Transportation needs     Medical: Not on file     Non-medical: Not on file   Tobacco Use     Smoking status: Never Smoker     Smokeless tobacco: Former User     Types: Chew   Substance and Sexual Activity     Alcohol use: Yes     Comment: Alcoholic Drinks/day: Occasional     Drug use: No     Sexual activity: Yes     Partners: Female   Lifestyle     Physical activity     Days per week: Not on file     Minutes per session: Not on file     Stress: Not on file   Relationships     Social connections     Talks on phone: Not on file     Gets together: Not on file     Attends [employer] service: Not on file     Active member of club or organization: Not on file     Attends meetings of clubs or organizations: Not on file     Relationship status: Not on file     Intimate partner violence     Fear of current or ex partner: Not on file     Emotionally abused: Not on file     Physically abused: Not on file     Forced " sexual activity: Not on file   Other Topics Concern     Parent/sibling w/ CABG, MI or angioplasty before 65F 55M? Not Asked   Social History Narrative    Was ; works for [employer] as a . 3/16.  Children 2 daughters, Brooks age 7 and Ashlie age 4, Son Junior      Patient was  for 17 years and is currently .  He has 2 grown daughters and a 13-year-old son who lives with him half time.  He lives with his significant other and his son one half of the time.  He worked as a  for 23 years and more recently has been promoted to a  type position    Mental Status Exam:  This is an alert, cooperative, fully oriented man appearing stated age.  Speech is normal rate, rhythm and volume.  Memory and cognition are grossly intact, but not formally tested.  Mood shows moderate to marked depression and mild anxiety (on medication).  Affect is full range without significant lability.  Patient reports occasional passive suicidal ideation, but never with plan or intent.  He denies homicidal or paranoid ideation.  Thought processes are goal-directed without hallucinations or delusions.  Insight and judgment are adequate    Diagnosis:  Major depression, recurrent, moderate  Anxiety, unspecified  Rule out seasonal affective disorder    Impression/Assessment:  This patient has had longstanding problems with depression that have been difficult to treat to full remission.  We discussed several possible treatment options and decided on a trial of Trintellix, with target dose 10-20 mg a day.  We discussed the possibility of including individual psychotherapy and patient reports that he has benefited from therapy in the past but also felt that after a while it was not helping anymore.  At this time it appears that therapy can be optional.      Treatment Plan:  1. Discontinue citalopram  2. Trial of Trintellix 10-20 mg a day for depression and anxiety  3. Patient is  encouraged to work up to exercising aerobically for 45 minutes at a time 4 days a week to help treat depression  4. Follow-up with Dr. Padilla in 3 weeks    Total time spent on day of visit 74 minutes-6 minutes (8:38 AM through 8:44 AM) review of EMR prior to visit, 52 minutes (8:56 AM through 9:48 AM) face-to-face meeting with patient, including counseling on above issues, discussion of risks/benefits, alternatives and possible side effects to medications, prescription of medications in the EMR, and detailed verbal and written instructions on adjustments of dosages, 16 minutes (9:48 AM through 10:04 AM) documentation in the EMR      Signed: Vinicius Padilla MD on 3/31/2021 at 9:49 AM

## 2021-03-31 NOTE — NURSING NOTE
"Chief Complaint   Patient presents with     Anxiety       Initial /80 (BP Location: Right arm, Patient Position: Sitting, Cuff Size: Adult Large)   Pulse 77   Temp 96.9  F (36.1  C) (Tympanic)   Resp 18   Ht 1.753 m (5' 9\")   Wt 92.1 kg (203 lb)   SpO2 99%   BMI 29.98 kg/m   Estimated body mass index is 29.98 kg/m  as calculated from the following:    Height as of this encounter: 1.753 m (5' 9\").    Weight as of this encounter: 92.1 kg (203 lb).  Medication Reconciliation: complete    SANJUANA ALBA, LPN  "

## 2021-04-01 ASSESSMENT — ANXIETY QUESTIONNAIRES: GAD7 TOTAL SCORE: 2

## 2021-04-21 ENCOUNTER — MYC MEDICAL ADVICE (OUTPATIENT)
Dept: FAMILY MEDICINE | Facility: OTHER | Age: 46
End: 2021-04-21

## 2021-04-21 ENCOUNTER — OFFICE VISIT (OUTPATIENT)
Dept: PSYCHIATRY | Facility: OTHER | Age: 46
End: 2021-04-21
Attending: PSYCHIATRY & NEUROLOGY
Payer: COMMERCIAL

## 2021-04-21 VITALS
OXYGEN SATURATION: 96 % | RESPIRATION RATE: 20 BRPM | TEMPERATURE: 97.4 F | BODY MASS INDEX: 30.04 KG/M2 | WEIGHT: 203.4 LBS | HEART RATE: 70 BPM | DIASTOLIC BLOOD PRESSURE: 60 MMHG | SYSTOLIC BLOOD PRESSURE: 112 MMHG

## 2021-04-21 DIAGNOSIS — F32.A DEPRESSION, UNSPECIFIED DEPRESSION TYPE: Primary | ICD-10-CM

## 2021-04-21 DIAGNOSIS — G47.10 HYPERSOMNIA: ICD-10-CM

## 2021-04-21 PROCEDURE — 99215 OFFICE O/P EST HI 40 MIN: CPT | Performed by: PSYCHIATRY & NEUROLOGY

## 2021-04-21 PROCEDURE — 99417 PROLNG OP E/M EACH 15 MIN: CPT | Performed by: PSYCHIATRY & NEUROLOGY

## 2021-04-21 RX ORDER — ARMODAFINIL 250 MG/1
250 TABLET ORAL EVERY MORNING
Qty: 30 TABLET | Refills: 5 | Status: SHIPPED | OUTPATIENT
Start: 2021-04-21 | End: 2021-06-15

## 2021-04-21 RX ORDER — ARMODAFINIL 250 MG/1
250 TABLET ORAL EVERY MORNING
Qty: 10 TABLET | Refills: 3 | Status: SHIPPED | OUTPATIENT
Start: 2021-04-21 | End: 2021-06-15

## 2021-04-21 ASSESSMENT — ANXIETY QUESTIONNAIRES
2. NOT BEING ABLE TO STOP OR CONTROL WORRYING: SEVERAL DAYS
IF YOU CHECKED OFF ANY PROBLEMS ON THIS QUESTIONNAIRE, HOW DIFFICULT HAVE THESE PROBLEMS MADE IT FOR YOU TO DO YOUR WORK, TAKE CARE OF THINGS AT HOME, OR GET ALONG WITH OTHER PEOPLE: SOMEWHAT DIFFICULT
GAD7 TOTAL SCORE: 5
7. FEELING AFRAID AS IF SOMETHING AWFUL MIGHT HAPPEN: NOT AT ALL
3. WORRYING TOO MUCH ABOUT DIFFERENT THINGS: SEVERAL DAYS
6. BECOMING EASILY ANNOYED OR IRRITABLE: SEVERAL DAYS
1. FEELING NERVOUS, ANXIOUS, OR ON EDGE: SEVERAL DAYS
5. BEING SO RESTLESS THAT IT IS HARD TO SIT STILL: NOT AT ALL

## 2021-04-21 ASSESSMENT — PATIENT HEALTH QUESTIONNAIRE - PHQ9
SUM OF ALL RESPONSES TO PHQ QUESTIONS 1-9: 5
5. POOR APPETITE OR OVEREATING: SEVERAL DAYS

## 2021-04-21 ASSESSMENT — PAIN SCALES - GENERAL: PAINLEVEL: NO PAIN (0)

## 2021-04-21 NOTE — PROGRESS NOTES
Psychiatric Progress Note/Visit  April 21, 2021    Identifying Data:  This is a 46-year-old man seen for follow-up psychiatric medication management visit for treatment of depression and anxiety    Interval History:  Patient was seen for psychiatric evaluation on March 31, 2021.  At that time we chose to discontinue citalopram and start patient on Trintellix 10 mg a day.  Patient reports that he is having partial response to the Trintellix and chose to increase to 20 mg a day approximately 1 week ago (as patient was directed in his previous visit).  He notes that he started to feel better for a while but then began to feel depressed again and reports that he is again having trouble getting going in the morning.  He also notes that he has been having some sexual side effects and they may be getting worse on the higher dosage of medicine.  He does report that he is having very little problems with anxiety, even though he still remains depressed.    Mental Status Exam:  Patient's anxiety is less than previously, now considered minimal.  Patient's depression remains moderate to marked.  Patient reports decreased frequency of passive suicidal thoughts.  Remainder of MSE is essentially unchanged from March 31, 2021    Current Psychiatric Medications:  Trintellix 20 mg a day    Lab Tests/Results:  No laboratory studies were ordered previously and none are being ordered today    Diagnosis:  Major depression, recurrent, moderate  Anxiety, unspecified  Rule out shift work sleep disorder  Rule out SAD    Impression/Assessment:  Patient had an initial positive response to Trintellix, especially after increasing to 20 mg a day.  However this response is not maintained and he is having some sexual side effects.  We discussed a number of possible options and decided on a trial of armodafinil, especially because patient reports that he has to wake up by at least 6:00 in the morning 5 days a week.  We also discussed checking thyroid  levels for possible thyroid augmentation in the future and rechecking vitamin D levels, because he has been low in the past and is only taking over-the-counter supplements.    Plan:  1.  Continue Trintellix 20 mg a day for depression and anxiety, patient was given the option to decrease back to 10 mg a day if side effects caused too many problems.  2.  Trial of armodafinil 250 mg in the morning to help treat depression and possible shift work sleep disorder  3.  Get blood work to check vitamin D levels and thyroid panel  4.  Follow-up with Dr. Padilla in 3 weeks    Time spent on day of visit 75 minutes-5 minutes (8:16 AM-8:21 AM) review of EMR prior to visit 58 minutes (9:54 AM through 1015 2 AM) face-to-face meeting with patient, including counseling on above issues, discussion of risk/benefits, alternatives and possible side effects to medications, prescription of medications and laboratory studies in the EMR, 12 minutes (10:51 AM through 11:03 AM) documentation in the EMR,    Vinicius Padilla MD

## 2021-04-21 NOTE — NURSING NOTE
"Chief Complaint   Patient presents with     Depression       Initial /60 (BP Location: Right arm, Patient Position: Sitting, Cuff Size: Adult Large)   Pulse 70   Temp 97.4  F (36.3  C) (Tympanic)   Resp 20   Wt 92.3 kg (203 lb 6.4 oz)   SpO2 96%   BMI 30.04 kg/m   Estimated body mass index is 30.04 kg/m  as calculated from the following:    Height as of 3/31/21: 1.753 m (5' 9\").    Weight as of this encounter: 92.3 kg (203 lb 6.4 oz).  Medication Reconciliation: complete    SANUJANA ALBA, BARIN  "

## 2021-04-22 ASSESSMENT — ANXIETY QUESTIONNAIRES: GAD7 TOTAL SCORE: 5

## 2021-04-23 ENCOUNTER — MYC MEDICAL ADVICE (OUTPATIENT)
Dept: PSYCHIATRY | Facility: OTHER | Age: 46
End: 2021-04-23

## 2021-05-20 ENCOUNTER — IMMUNIZATION (OUTPATIENT)
Dept: FAMILY MEDICINE | Facility: OTHER | Age: 46
End: 2021-05-20
Attending: FAMILY MEDICINE
Payer: COMMERCIAL

## 2021-05-20 PROCEDURE — 91300 PR COVID VAC PFIZER DIL RECON 30 MCG/0.3 ML IM: CPT

## 2021-05-20 PROCEDURE — 0001A PR COVID VAC PFIZER DIL RECON 30 MCG/0.3 ML IM: CPT

## 2021-05-25 ENCOUNTER — OFFICE VISIT (OUTPATIENT)
Dept: PSYCHIATRY | Facility: OTHER | Age: 46
End: 2021-05-25
Attending: PSYCHIATRY & NEUROLOGY
Payer: COMMERCIAL

## 2021-05-25 VITALS
BODY MASS INDEX: 30.57 KG/M2 | WEIGHT: 207 LBS | TEMPERATURE: 97.4 F | RESPIRATION RATE: 16 BRPM | SYSTOLIC BLOOD PRESSURE: 102 MMHG | OXYGEN SATURATION: 96 % | DIASTOLIC BLOOD PRESSURE: 80 MMHG | HEART RATE: 80 BPM

## 2021-05-25 DIAGNOSIS — T88.7XXA MEDICATION SIDE EFFECTS: ICD-10-CM

## 2021-05-25 DIAGNOSIS — F32.A DEPRESSION, UNSPECIFIED DEPRESSION TYPE: Primary | ICD-10-CM

## 2021-05-25 PROCEDURE — 99214 OFFICE O/P EST MOD 30 MIN: CPT | Performed by: PSYCHIATRY & NEUROLOGY

## 2021-05-25 RX ORDER — ARIPIPRAZOLE 5 MG/1
5 TABLET ORAL DAILY
Qty: 30 TABLET | Refills: 4 | Status: SHIPPED | OUTPATIENT
Start: 2021-05-25 | End: 2022-04-12

## 2021-05-25 ASSESSMENT — ANXIETY QUESTIONNAIRES
1. FEELING NERVOUS, ANXIOUS, OR ON EDGE: NOT AT ALL
3. WORRYING TOO MUCH ABOUT DIFFERENT THINGS: NOT AT ALL
7. FEELING AFRAID AS IF SOMETHING AWFUL MIGHT HAPPEN: NOT AT ALL
2. NOT BEING ABLE TO STOP OR CONTROL WORRYING: NOT AT ALL
6. BECOMING EASILY ANNOYED OR IRRITABLE: NOT AT ALL
5. BEING SO RESTLESS THAT IT IS HARD TO SIT STILL: NOT AT ALL
GAD7 TOTAL SCORE: 0

## 2021-05-25 ASSESSMENT — PAIN SCALES - GENERAL: PAINLEVEL: NO PAIN (0)

## 2021-05-25 ASSESSMENT — PATIENT HEALTH QUESTIONNAIRE - PHQ9: 5. POOR APPETITE OR OVEREATING: NOT AT ALL

## 2021-05-25 NOTE — PROGRESS NOTES
"Psychiatric Progress Note/Visit  May 25, 2021    Identifying Data:  This is a 46-year-old man seen for follow-up psychiatric medication management visit for treatment of depression and anxiety    Interval History:  He was most recently seen on April 21, 2021.  At that time we added armodafinil to try to help with his depressed mood.  In addition patient was given the option to decrease his Trintellix dosage from 20 mg a day to 10 mg a day to decrease side effects.  Patient called on April 23, 2021 saying that he did not like how he felt on armodafinil and he stopped it.  He also requested to restart Abilify at 2.5 mg a day, as he still had a supply of 5 mg pills left.  Today patient reports that \"I feel good\" on 10 mg of Trintellix a day and 2.5 mg of Abilify.  He is comfortable keeping these medications at these dosages for now.    Mental Status Exam:  Anxiety today continues to decrease, at the minimal to absent level and depression has also decreased at the minimal to mild level.  The remainder of the MSE is essentially unchanged from April 21, 2021.    Current Psychiatric Medications:  Trintellix 10 mg a day  Abilify 2.5 mg a day    Lab Tests/Results:  Patient did not get laboratory studies done, but we will also order fasting lipid panel, CMP, hemoglobin A1c, and CBC to be done along with vitamin D level and thyroid panel prior to next visit    Diagnosis:  Major depression, recurrent, moderate  Anxiety, unspecified  Rule out seasonal affective disorder    Impression/Assessment:  Patient did not tolerate the addition of armodafinil, but has had a very good response to the addition of low-dose Abilify to the 10 mg dosage of Trintellix.  Both anxiety and depression continue to improve and patient is content to continue these current medication dosages for another 2 months and then reassess.    Plan:  1.  Add Abilify 2.5 mg a day as an  adjunctive treatment of depression  2.  Continue Trintellix 10 mg a day for " depression and anxiety  3.  Get blood work done, as above, prior to next visit  4.  Follow-up with Dr. Padilla in 2 months    Time spent on day of visit 37 minutes-5 minutes (8:51 AM-8:56 AM)  review of EMR prior to visit, 23 minutes (2:41 PM through 3:04 PM)  face-to-face meeting with patient, including discussion of risk/benefits, alternatives and possible side effects to medication, counseling on above issues, and prescription of medications in the EMR, 9 minutes (3:04 PM through 3:13 PM)  documentation in the EMR      Vinicius Padilla MD

## 2021-05-25 NOTE — NURSING NOTE
Meliton Finnegan is a 46 year old male presenting for a follow up of his depression and anxiety  Medication Reconciliation: complete    Patricia Abel LPN  5/25/2021 2:31 PM

## 2021-05-26 ASSESSMENT — ANXIETY QUESTIONNAIRES: GAD7 TOTAL SCORE: 0

## 2021-06-10 ENCOUNTER — IMMUNIZATION (OUTPATIENT)
Dept: FAMILY MEDICINE | Facility: OTHER | Age: 46
End: 2021-06-10
Attending: FAMILY MEDICINE
Payer: COMMERCIAL

## 2021-06-10 PROCEDURE — 0002A PR COVID VAC PFIZER DIL RECON 30 MCG/0.3 ML IM: CPT

## 2021-06-10 PROCEDURE — 91300 PR COVID VAC PFIZER DIL RECON 30 MCG/0.3 ML IM: CPT

## 2021-06-14 ENCOUNTER — MYC MEDICAL ADVICE (OUTPATIENT)
Dept: FAMILY MEDICINE | Facility: OTHER | Age: 46
End: 2021-06-14

## 2021-06-15 ENCOUNTER — OFFICE VISIT (OUTPATIENT)
Dept: FAMILY MEDICINE | Facility: OTHER | Age: 46
End: 2021-06-15
Attending: NURSE PRACTITIONER
Payer: COMMERCIAL

## 2021-06-15 ENCOUNTER — HOSPITAL ENCOUNTER (OUTPATIENT)
Dept: GENERAL RADIOLOGY | Facility: OTHER | Age: 46
End: 2021-06-15
Attending: NURSE PRACTITIONER
Payer: COMMERCIAL

## 2021-06-15 ENCOUNTER — MYC MEDICAL ADVICE (OUTPATIENT)
Dept: FAMILY MEDICINE | Facility: OTHER | Age: 46
End: 2021-06-15

## 2021-06-15 VITALS
SYSTOLIC BLOOD PRESSURE: 142 MMHG | BODY MASS INDEX: 28.91 KG/M2 | RESPIRATION RATE: 17 BRPM | TEMPERATURE: 96.8 F | HEIGHT: 69 IN | WEIGHT: 195.2 LBS | OXYGEN SATURATION: 96 % | HEART RATE: 87 BPM | DIASTOLIC BLOOD PRESSURE: 98 MMHG

## 2021-06-15 DIAGNOSIS — M54.42 ACUTE BILATERAL LOW BACK PAIN WITH LEFT-SIDED SCIATICA: ICD-10-CM

## 2021-06-15 DIAGNOSIS — M54.42 ACUTE BILATERAL LOW BACK PAIN WITH LEFT-SIDED SCIATICA: Primary | ICD-10-CM

## 2021-06-15 PROCEDURE — 99213 OFFICE O/P EST LOW 20 MIN: CPT | Performed by: NURSE PRACTITIONER

## 2021-06-15 PROCEDURE — 72100 X-RAY EXAM L-S SPINE 2/3 VWS: CPT

## 2021-06-15 RX ORDER — CYCLOBENZAPRINE HCL 10 MG
10 TABLET ORAL 3 TIMES DAILY PRN
Qty: 30 TABLET | Refills: 0 | Status: SHIPPED | OUTPATIENT
Start: 2021-06-15 | End: 2021-11-23

## 2021-06-15 ASSESSMENT — PAIN SCALES - GENERAL: PAINLEVEL: MODERATE PAIN (4)

## 2021-06-15 ASSESSMENT — MIFFLIN-ST. JEOR: SCORE: 1765.8

## 2021-06-15 NOTE — PROGRESS NOTES
"HPI:    Meliton Finnegan is a 46 year old male who presents to clinic today for low back concerns.  He reports having low back pain with radiation down his left leg.  He reports approximately 2 and half months ago he was putting his dock in and doing a lot of heavy lifting.  That same day he was working on some retaining walls around his house.  He did not have any specific injuries but noticed approximately 2 to 3 days later that he had significant pain in his low back with radiation down his left leg.  He went to see his chiropractor and had 3-4 visits which seem to be helping.  Over the past few days he has noticed worsening pain.  Yesterday he was bending over doing some weeding in his yard and he was unable to stand back up.  He laid in his yard for over an hour because of the pain.  He has used ibuprofen occasionally which is somewhat helpful.  Denies any history of back issues.  No loss of bowel or bladder function.    Past Medical History:   Diagnosis Date     Epigastric pain     12/6/2017       Current Outpatient Medications   Medication Sig Dispense Refill     ARIPiprazole (ABILIFY) 5 MG tablet Take 1 tablet (5 mg) by mouth daily 30 tablet 4     cyclobenzaprine (FLEXERIL) 10 MG tablet Take 1 tablet (10 mg) by mouth 3 times daily as needed for muscle spasms 30 tablet 0     vortioxetine (TRINTELLIX) 10 MG tablet Take 1 tablet (10 mg) by mouth daily 30 tablet 4     vortioxetine (TRINTELLIX) 10 MG tablet Take 1 tablet (10 mg) by mouth daily 30 tablet 4       No Known Allergies    ROS:  Pertinent positives and negatives are noted in HPI.    EXAM:  BP (!) 142/98 (BP Location: Right arm, Patient Position: Sitting, Cuff Size: Adult Regular)   Pulse 87   Temp 96.8  F (36  C) (Temporal)   Resp 17   Ht 1.753 m (5' 9\")   Wt 88.5 kg (195 lb 3.2 oz)   SpO2 96%   BMI 28.83 kg/m    General appearance: well appearing male, in no acute distress  Musculoskeletal: No tenderness over lumbar spine, tender over left SI " area with palpation.  Negative straight leg raise.  He does show signs of pain when he is going from sitting to standing and laying to sitting.  Dermatological: no rashes or lesions  Psychological: normal affect, alert and pleasant  Xray: xray independently reviewed and no acute issues appreciated; pending radiology over-read    ASSESSMENT AND PLAN:    1. Acute bilateral low back pain with left-sided sciatica      2-week of acute low back pain with left-sided sciatica.  He feels like something is significantly wrong in his back and would like to have an MRI.  X-ray was stable.  Will treat with naproxen and Flexeril to calm the muscles and treat inflammation.  Also encouraged ice to area and continue chiropractor as needed.  He is aware that insurance may not cover the MRI as he is only had pain for 2 weeks and has not tried alternate treatments.  MRI was ordered per request and will follow up as needed.      RAJIV Murray CNP..................6/15/2021 1:44 PM

## 2021-06-15 NOTE — NURSING NOTE
"Chief Complaint   Patient presents with     Back Pain     low back and down left leg (sciatica)     Patient presents to clinic with low back pain. He states that he has had it ongoing for a couple of months. He states he believes it is sciatic related, going to the chiro was helping, but not so much anymore. Pain is at an 8 most of the time.     Initial BP (!) 142/98 (BP Location: Right arm, Patient Position: Sitting, Cuff Size: Adult Regular)   Pulse 87   Temp 96.8  F (36  C) (Temporal)   Resp 17   Ht 1.753 m (5' 9\")   Wt 88.5 kg (195 lb 3.2 oz)   SpO2 96%   BMI 28.83 kg/m   Estimated body mass index is 28.83 kg/m  as calculated from the following:    Height as of this encounter: 1.753 m (5' 9\").    Weight as of this encounter: 88.5 kg (195 lb 3.2 oz).         Medication Reconciliation: Complete      Kaela Garcia   "

## 2021-06-16 ENCOUNTER — HOSPITAL ENCOUNTER (OUTPATIENT)
Dept: MRI IMAGING | Facility: OTHER | Age: 46
Discharge: HOME | End: 2021-06-16
Attending: NURSE PRACTITIONER | Admitting: NURSE PRACTITIONER
Payer: COMMERCIAL

## 2021-06-16 DIAGNOSIS — M54.42 ACUTE BILATERAL LOW BACK PAIN WITH LEFT-SIDED SCIATICA: ICD-10-CM

## 2021-06-16 PROCEDURE — 72148 MRI LUMBAR SPINE W/O DYE: CPT

## 2021-06-17 ENCOUNTER — MYC MEDICAL ADVICE (OUTPATIENT)
Dept: FAMILY MEDICINE | Facility: OTHER | Age: 46
End: 2021-06-17

## 2021-06-17 DIAGNOSIS — M54.42 ACUTE BILATERAL LOW BACK PAIN WITH LEFT-SIDED SCIATICA: Primary | ICD-10-CM

## 2021-07-26 ENCOUNTER — OFFICE VISIT (OUTPATIENT)
Dept: PSYCHIATRY | Facility: OTHER | Age: 46
End: 2021-07-26
Attending: PSYCHIATRY & NEUROLOGY
Payer: COMMERCIAL

## 2021-07-26 VITALS
RESPIRATION RATE: 16 BRPM | WEIGHT: 202.2 LBS | HEIGHT: 69 IN | BODY MASS INDEX: 29.95 KG/M2 | SYSTOLIC BLOOD PRESSURE: 130 MMHG | HEART RATE: 62 BPM | DIASTOLIC BLOOD PRESSURE: 84 MMHG | TEMPERATURE: 97 F | OXYGEN SATURATION: 98 %

## 2021-07-26 DIAGNOSIS — F32.A DEPRESSION, UNSPECIFIED DEPRESSION TYPE: Primary | ICD-10-CM

## 2021-07-26 PROCEDURE — 99214 OFFICE O/P EST MOD 30 MIN: CPT | Performed by: PSYCHIATRY & NEUROLOGY

## 2021-07-26 ASSESSMENT — MIFFLIN-ST. JEOR: SCORE: 1797.55

## 2021-07-26 ASSESSMENT — PATIENT HEALTH QUESTIONNAIRE - PHQ9: SUM OF ALL RESPONSES TO PHQ QUESTIONS 1-9: 0

## 2021-07-26 ASSESSMENT — PAIN SCALES - GENERAL: PAINLEVEL: NO PAIN (0)

## 2021-07-26 NOTE — PROGRESS NOTES
"Psychiatric Progress Note/Visit  July 26, 2021    Identifying Data:  This is a 46-year-old man seen for follow-up psychiatric medication management visit for treatment of depression and anxiety    Interval History:  Patient was most recently seen on May 25, 2021.  At that time he had stopped his armodafanil due to a negative reaction and had started on Abilify.  He reported that he was doing \"good\" on a combination of Trintellix 10 mg and Abilify 2.5 mg a day.  Today patient reports that overall he has been doing \"pretty good\", but he does note that on some days he feels that his \"depression is lingering\" with decreased motivation and not feeling good when he gets up in the morning.    Mental Status Exam:  Anxiety remains minimal to absent, depression remains minimal to mild.  Remainder of MSE is essentially unchanged from May 25, 2021.    Current Psychiatric Medications:  Trintellix 10 mg a day  Abilify 2.5 mg a day    Lab Tests/Results:  Patient's weight today was 202 pounds and 2 ounces.  His weight on May 27, 2021 was 207 pounds.  His weight noted in the EMR on Michelle 15, 2021 was 195 pounds and 3 ounces.  Patient has not yet done his fasting blood work.    Diagnosis:  Major depression, recurrent, moderate  Anxiety, unspecified  Rule out seasonal affective disorder    Impression/Assessment:  Patient continues to have a partial response to the combination of Abilify 2.5 mg and Trintellix 10 mg a day.  However he  continues to have noticeable problems with depression at times.  We discussed the possibility of increasing Abilify, but patient is concerned that it appeared to cause significant weight gain  at a higher dose.  In addition we talked about possibly increasing the Trintellix dose, but patient says that he had increased sexual side effects at the higher dose and still has some occasional residual sexual side effects.    Plan:  1.  Trial of increased dosage of Abilify to 5 mg a day, for depression  2.  If " patient can begin to notice an improvement in his depression after 1-2 weeks of the 5 mg dosage of Abilify, he is instructed to get his fasting blood work done.  If he cannot tolerate the higher dosage of Abilify, he is instructed to decrease back to 2.5 mg a day and to hold off getting his blood done until we consider adding a different mood stabilizing medicine.  3.  Continue Trintellix 10 mg a day for depression  4.  Follow-up with Dr. Pdailla in 1 month    Time spent on day of visit 36 minutes-5 minutes (11:48 AM through 11:53 AM)  review of EMR prior to visit, 21 minutes (3:52 PM through 4:13 PM)  face-to-face meeting with patient, including  discussion of medication dosage adjustments and counseling on above issues, 10 minutes (4:14 PM through 4:24 PM)  documentation in the EMR      Vinicius Padilla MD

## 2021-08-16 ENCOUNTER — ALLIED HEALTH/NURSE VISIT (OUTPATIENT)
Dept: FAMILY MEDICINE | Facility: OTHER | Age: 46
End: 2021-08-16
Attending: PHYSICIAN ASSISTANT
Payer: COMMERCIAL

## 2021-08-16 DIAGNOSIS — Z20.822 ENCOUNTER FOR LABORATORY TESTING FOR COVID-19 VIRUS: Primary | ICD-10-CM

## 2021-08-16 DIAGNOSIS — Z20.822 EXPOSURE TO 2019 NOVEL CORONAVIRUS: ICD-10-CM

## 2021-08-16 LAB — SARS-COV-2 RNA RESP QL NAA+PROBE: NEGATIVE

## 2021-08-16 PROCEDURE — C9803 HOPD COVID-19 SPEC COLLECT: HCPCS

## 2021-08-16 PROCEDURE — U0003 INFECTIOUS AGENT DETECTION BY NUCLEIC ACID (DNA OR RNA); SEVERE ACUTE RESPIRATORY SYNDROME CORONAVIRUS 2 (SARS-COV-2) (CORONAVIRUS DISEASE [COVID-19]), AMPLIFIED PROBE TECHNIQUE, MAKING USE OF HIGH THROUGHPUT TECHNOLOGIES AS DESCRIBED BY CMS-2020-01-R: HCPCS | Mod: ZL

## 2021-08-18 ENCOUNTER — MYC MEDICAL ADVICE (OUTPATIENT)
Dept: FAMILY MEDICINE | Facility: OTHER | Age: 46
End: 2021-08-18

## 2021-08-18 DIAGNOSIS — M54.42 ACUTE BILATERAL LOW BACK PAIN WITH LEFT-SIDED SCIATICA: Primary | ICD-10-CM

## 2021-09-08 ENCOUNTER — OFFICE VISIT (OUTPATIENT)
Dept: PSYCHIATRY | Facility: OTHER | Age: 46
End: 2021-09-08
Attending: PSYCHIATRY & NEUROLOGY
Payer: COMMERCIAL

## 2021-09-08 VITALS
HEIGHT: 69 IN | WEIGHT: 202 LBS | HEART RATE: 88 BPM | BODY MASS INDEX: 29.92 KG/M2 | DIASTOLIC BLOOD PRESSURE: 80 MMHG | TEMPERATURE: 97.2 F | OXYGEN SATURATION: 98 % | RESPIRATION RATE: 16 BRPM | SYSTOLIC BLOOD PRESSURE: 128 MMHG

## 2021-09-08 DIAGNOSIS — F32.A DEPRESSION, UNSPECIFIED DEPRESSION TYPE: Primary | ICD-10-CM

## 2021-09-08 PROCEDURE — 99215 OFFICE O/P EST HI 40 MIN: CPT | Performed by: PSYCHIATRY & NEUROLOGY

## 2021-09-08 ASSESSMENT — PAIN SCALES - GENERAL: PAINLEVEL: NO PAIN (0)

## 2021-09-08 ASSESSMENT — MIFFLIN-ST. JEOR: SCORE: 1796.65

## 2021-09-08 NOTE — PROGRESS NOTES
"Psychiatric Progress Note/Visit  September 8, 2021    Identifying Data:  This is a 46-year-old man seen for follow-up psychiatric medication management visit for treatment of depression, anxiety and seasonal affective disorder    Interval History:  Patient was most recently seen on July 26, 2021.  At that time he had stopped armodafinil because he did not like how he felt on it.  He was continuing to take Abilify 2.5 mg and Trintellix 10 mg a day.  He was doing reasonably well but complained of \"depression lingering\" and decreased motivation.  We discussed possibly increasing Abilify to 5 mg a day and continuing Trintellix at 10 mg a day.  Today patient reports that he \"quit taking my medication about a week ago\".  He reported that he was feeling \"numb... no real feelings\" on his medications.  He reports no withdrawal problems and no return of depressive or anxiety symptoms.  He went on to say that \"yesterday was the best day I've had in years\".  He notes that he ran 3 miles yesterday.    Mental Status Exam:  Anxiety has continued to decrease, essentially absent.  Depression has also decreased, in the minimal to absent range.  Remainder of MSE is essentially unchanged from July 26, 2021.      Current Psychiatric Medications:  None (he had been taking Trintellix 10 mg a day and Abilify 2.5 mg a day until he stopped both of these)    Lab Tests/Results:  Patient's weight today was 202 pounds.  His weight on July 26, 2021 was 202 pounds and 3 ounces.  His weight on May 27 was 207 pounds.    Diagnosis:  Major depression, single episode, continuous, moderate, in early remission  Anxiety, unspecified  Seasonal affective disorder    Impression/Assessment:  Patient chose to stop his medications on his own because he felt they were numbing him too much.  So far he is doing well with no impairment from depression or anxiety.  We discussed my recommendation to restart Trintellix if he has 3-4+ days of impairment/symptoms in a " navya.  We also discussed my recommendation to begin to use his full-spectrum light, starting 1 October and continuing at least through the end of March next year.  He was reminded to use the light for 1 hour a day and to have it within 1 to 2 feet of him.    Plan:  1.  Continue to observe patient without any psychiatric medications  2.  Continue to exercise aerobically for at least 45 minutes a day at least 4 days a week to treat depression  3.  Use full-spectrum light therapy for 1 hour a day, October through March or April, for seasonal affective disorder  4.  Follow-up with Dr. Padilla on an as-needed basis    Time spent on day of visit 48 minutes-9 minutes (8:06 AM through 8:15 AM)  review of EMR prior to visit, 24 minutes (9:19 AM through 9:43 AM)  face-to-face meeting with patient, including discussion of risk/benefits, alternatives and possible side effects to medication, counseling on above issues, 15 minutes (9:43 AM through 9:58 AM)  documentation in the EMR      Vinicius Padilla MD

## 2021-09-17 ENCOUNTER — MYC MEDICAL ADVICE (OUTPATIENT)
Dept: PSYCHIATRY | Facility: OTHER | Age: 46
End: 2021-09-17

## 2021-09-17 ENCOUNTER — TELEPHONE (OUTPATIENT)
Dept: FAMILY MEDICINE | Facility: OTHER | Age: 46
End: 2021-09-17
Payer: COMMERCIAL

## 2021-09-17 NOTE — TELEPHONE ENCOUNTER
I think starting the medications again is a good idea. You should be okay to start both medications at previous dosages.

## 2021-09-22 ENCOUNTER — HOSPITAL ENCOUNTER (OUTPATIENT)
Dept: PHYSICAL THERAPY | Facility: OTHER | Age: 46
Setting detail: THERAPIES SERIES
End: 2021-09-22
Attending: FAMILY MEDICINE
Payer: COMMERCIAL

## 2021-09-22 PROCEDURE — 97110 THERAPEUTIC EXERCISES: CPT | Mod: GP

## 2021-09-22 PROCEDURE — 97161 PT EVAL LOW COMPLEX 20 MIN: CPT | Mod: GP

## 2021-09-23 NOTE — PROGRESS NOTES
09/22/21 1200   General Information   Type of Visit Initial OP Ortho PT Evaluation   Start of Care Date 09/22/21   Referring Physician Dr. Mccarty    Patient/Family Goals Statement decrease pain and improve mobility.    Orders Evaluate and Treat   Date of Order 08/18/21   Certification Required? No   Medical Diagnosis Acute bilateral low back pain with left-sided sciatica M54.42   Surgical/Medical history reviewed Yes   Body Part(s)   Body Part(s) Lumbar Spine/SI   Presentation and Etiology   Pertinent history of current problem (include personal factors and/or comorbidities that impact the POC) Left sided low back pain began in May after lifting retaining wall block.  He sustatined a reinjury swinging a golf club a few weeks later.  Symptoms improved with chiropractic care.  Currenlty he is feeling better but has a constant dull ache in the left side of the low back. Intermittent pain into the left LE.  He reports he would like to find ways to resolve symptoms and avoid reinjury.     Impairments A. Pain;B. Decreased WB tolerance;E. Decreased flexibility;D. Decreased ROM   Functional Limitations perform activities of daily living;perform desired leisure / sports activities   Symptom Location Left side of of low back, intermittent radicular symptoms down the posterior thigh into the lateral lower leg   How/Where did it occur   (Lifting retaining wall block, and putting in docks)   Onset date of current episode/exacerbation   (May 2021)   Chronicity Recurrent   Pain rating (0-10 point scale) Best (/10);Worst (/10)   Best (/10) 2/10   Worst (/10) 8/10   Pain quality E. Shooting;F. Stabbing   Frequency of pain/symptoms B. Intermittent   Pain/symptoms exacerbated by A. Sitting  (Sitting with increased left lower extremity radicular sympto)   Pain/symptoms eased by D. Nothing   Progression of symptoms since onset: Improved   Prior Level of Function   Prior Level of Function-Mobility No limitations   Prior Level of  Function-ADLs No limitations   Current Level of Function   Current Community Support Family/friend caregiver   Patient role/employment history A. Employed   Fall Risk Screen   Fall screen completed by PT   Have you fallen 2 or more times in the past year? No   Have you fallen and had an injury in the past year? No   Is patient a fall risk? No   Lumbar Spine/SI Objective Findings   Observation No limitations noted with transfers    Gait/Locomotion Normal gait    Flexion ROM finger to ankles with reports of discomfort over the left low back and left LE     Extension ROM 30 degrees, decreased left LE symptoms   Hip Flexion (L2) Strength 5/5B    Knee Flexion Strength 5/5B    Knee Extension (L3) Strength 5/5B    Ankle Dorsiflexion (L4) Strength 5/5B    Great Toe Extension (L5) Strength 5/5B    Ankle Plantar Flexion (S1) Strength 5/5B    SLR increased neural tension on the left    Repeated Extension Prone decreased left LE symptoms    Slump Test increased neural tension on the left    Palpation no specific tenderness with palpation    Planned Therapy Interventions   Planned Therapy Interventions joint mobilization;manual therapy;neuromuscular re-education;ROM;strengthening;stretching   Clinical Impression   Criteria for Skilled Therapeutic Interventions Met yes, treatment indicated   PT Diagnosis Acute bilateral low back pain with left-sided sciatica M54.42   Influenced by the following impairments pain, Left LE radicular symptoms   Functional limitations due to impairments sitting    Clinical Presentation Stable/Uncomplicated   Clinical Presentation Rationale symptoms are consistent with current diagnosis    Clinical Decision Making (Complexity) Low complexity   Therapy Frequency   (2-3 visits)   Predicted Duration of Therapy Intervention (days/wks) 8 weeks    Risk & Benefits of therapy have been explained Yes   Patient, Family & other staff in agreement with plan of care Yes   Education Assessment   Preferred Learning  Style Listening;Demonstration;Pictures/video   Barriers to Learning No barriers   ORTHO GOALS   PT Ortho Eval Goals 1;2;3   Ortho Goal 1   Goal Identifier ROM    Goal Description Demonstrate the ability to forward flex the lumbar spine and  an object from the ground without limitation or pain.   Target Date 11/18/21   Ortho Goal 2   Goal Identifier Left lower extremity radicular symptoms   Goal Description Patient will report the resolution of left lower extremity radicular symptoms to allow sitting for 60 minutes in a car without difficulty.   Target Date 11/18/21   Total Evaluation Time   PT Maria Eugenia, Low Complexity Minutes ([id number]) 20

## 2021-10-09 ENCOUNTER — HEALTH MAINTENANCE LETTER (OUTPATIENT)
Age: 46
End: 2021-10-09

## 2021-10-09 ENCOUNTER — OFFICE VISIT (OUTPATIENT)
Dept: FAMILY MEDICINE | Facility: OTHER | Age: 46
End: 2021-10-09
Attending: PHYSICIAN ASSISTANT
Payer: COMMERCIAL

## 2021-10-09 VITALS
OXYGEN SATURATION: 99 % | BODY MASS INDEX: 29.53 KG/M2 | SYSTOLIC BLOOD PRESSURE: 144 MMHG | HEIGHT: 69 IN | HEART RATE: 77 BPM | TEMPERATURE: 97.3 F | RESPIRATION RATE: 18 BRPM | WEIGHT: 199.4 LBS | DIASTOLIC BLOOD PRESSURE: 80 MMHG

## 2021-10-09 DIAGNOSIS — M54.42 ACUTE BILATERAL LOW BACK PAIN WITH LEFT-SIDED SCIATICA: Primary | ICD-10-CM

## 2021-10-09 PROCEDURE — 99213 OFFICE O/P EST LOW 20 MIN: CPT | Performed by: PHYSICIAN ASSISTANT

## 2021-10-09 RX ORDER — CYCLOBENZAPRINE HCL 10 MG
10 TABLET ORAL 3 TIMES DAILY PRN
Qty: 30 TABLET | Refills: 1 | Status: SHIPPED | OUTPATIENT
Start: 2021-10-09 | End: 2021-11-23

## 2021-10-09 ASSESSMENT — PAIN SCALES - GENERAL: PAINLEVEL: SEVERE PAIN (6)

## 2021-10-09 ASSESSMENT — MIFFLIN-ST. JEOR: SCORE: 1784.85

## 2021-10-09 NOTE — PROGRESS NOTES
ASSESSMENT/PLAN:    I have reviewed the nursing notes.  I have reviewed the findings, diagnosis, plan and need for follow up with the patient.    1. Acute bilateral low back pain with left-sided sciatica  - cyclobenzaprine (FLEXERIL) 10 MG tablet; Take 1 tablet (10 mg) by mouth 3 times daily as needed for muscle spasms  Dispense: 30 tablet; Refill: 1  - Vital stable. PE consistent with Sprain of lumbosacral spine. Will place on muscle relaxer: cyclobenzaprine (Flexeril). Continue with gentle stretching and home PT regimen. Discussed not driving/operating machinery while on muscle relaxer as can be sedating. Recommended est and heat are recommended and topical Capsaicin. Discussed to be mindful of lifting and posture. May consider chiropractor, massage, acupuncture. May use over-the-counter Tylenol or ibuprofen PRN. If fevers, chills, worsening pains or signs of cauda equina syndrome occur (saddle anesthesia, loss of bowel or bladder function, etc.) patient agreeable to follow up promptly. Discussed that back pain can last 6-8 weeks. Patient is in agreement and understanding of the above treatment plan. All questions and concerns were addressed and answered to patient's satisfaction. AVS reviewed with patient.     Discussed warning signs/symptoms indicative of need to f/u    Follow up if symptoms persist or worsen or concerns    I explained my diagnostic considerations and recommendations to the patient, who voiced understanding and agreement with the treatment plan. All questions were answered. We discussed potential side effects of any prescribed or recommended therapies, as well as expectations for response to treatments.    Marivel Reina PA-C  10/9/2021  11:34 AM    HPI:    Meliton Finnegan is a 46 year old male  who presents to [facility] today for concerns of back pain located at low back left    Duration of symptoms: 1.5 weeks  Pain: 6/10  Pain progression: worse  Injury: leveling cabin  recently  Mechanical symptoms (popping, locking, catching): No  Palliative: rest, standing, gentle ROM  Provocative: sitting  Presence of sciatic symptoms: left sided which radiates to level of popliteal fossa currently, but can radiate to level of calcaneus     Treatments Tried: physical therapy and Naproxen/Ibuprofen.     Prior history of similar symptoms: yes, seen earlier this summer of similar issue, was on Flexeril and tolerated this medication well with good symptom relief. Recently had PT evaluation and pain resolved, was told did not need to come back. Does have comfort level performing exercises at home    No fevers, weight loss, bowel / bladder incontinence or localized weakness.    PCP: MD Bibiana    Past Medical History:   Diagnosis Date     Epigastric pain     12/6/2017     Past Surgical History:   Procedure Laterality Date     ESOPHAGOSCOPY, GASTROSCOPY, DUODENOSCOPY (EGD), COMBINED      12/7/2017     EXTRACTION(S) DENTAL      under anesthesia     FINGER SURGERY      Tendon repair, right fifth finger, under anesthesia     OTHER SURGICAL HISTORY      [id number],OUTER EAR SURGERY,Left ear surgery for cartilage reconstruction     OTHER SURGICAL HISTORY      2017,[id number],SHOULDER SURGERY,Bilateral     VASECTOMY      No Comments Provided     VASECTOMY      reversal scheduled for 12/06     Social History     Tobacco Use     Smoking status: Never Smoker     Smokeless tobacco: Former User     Types: Chew   Substance Use Topics     Alcohol use: Yes     Comment: Alcoholic Drinks/day: Occasional     Current Outpatient Medications   Medication Sig Dispense Refill     ARIPiprazole (ABILIFY) 5 MG tablet Take 1 tablet (5 mg) by mouth daily 30 tablet 4     cyclobenzaprine (FLEXERIL) 10 MG tablet Take 1 tablet (10 mg) by mouth 3 times daily as needed for muscle spasms 30 tablet 1     vortioxetine (TRINTELLIX) 10 MG tablet Take 1 tablet (10 mg) by mouth daily 30 tablet 4     cyclobenzaprine (FLEXERIL) 10 MG tablet Take 1  "tablet (10 mg) by mouth 3 times daily as needed for muscle spasms (Patient not taking: Reported on 7/26/2021) 30 tablet 0     No Known Allergies  Past medical history, past surgical history, current medications and allergies reviewed and accurate to the best of my knowledge.      ROS:  Refer to HPI    BP (!) 144/80   Pulse 77   Temp 97.3  F (36.3  C) (Tympanic)   Resp 18   Ht 1.753 m (5' 9\")   Wt 90.4 kg (199 lb 6.4 oz)   SpO2 99%   BMI 29.45 kg/m      EXAM:  General Appearance: Well appearing 46-year old male, appropriate appearance for age. No acute distress  Respiratory: normal chest wall and respirations.  Normal effort.  Clear to auscultation bilaterally, no wheezing, crackles or rhonchi.  No increased work of breathing.  No cough appreciated.  Cardiac: RRR with no murmurs  Abdomen: soft, nontender, no rigidity, no rebound tenderness or guarding, normal bowel sounds present  :  No suprapubic tenderness to palpation.  No CVA tenderness to palpation.    Musculoskeletal:    Thoracic/Lumbar Spine:  Inspection:    Lordosis: Normal   Kyphosis: Normal  Tenderness: left para lumbar muscles  ROM: normal flexion, extension, lumbar rotation and bending. Patient does have pain at end ROM at all planes.  Strength: normal strength bilateral LE  Special Test: positive left straight leg raise  Awake, alert, oriented to name, place and time.  Cranial nerves II-XII are grossly intact.  Motor is 5 out of 5 bilaterally.   Dermatological: no rashes noted of exposed skin  Psychological: normal affect, alert, oriented, and pleasant.     Labs:  None     Xray:  MRI Lumbar Spine 06/2021 - mild multilevel DJD noted, small disc protrusion L4-S1.   "

## 2021-10-09 NOTE — PATIENT INSTRUCTIONS
Please refer to your AVS for follow up and pain/symptoms management recommendations (I.e.: medications, helpful conservative treatment modalities, appropriate follow up if need to a specialist or family practice, etc.). Please return to urgent care if your symptoms change or worsen.     Discharge instructions:  -If you were prescribed a medication(s), please take this as prescribed/directed  -Monitor your symptoms, if changing/worsening, return to UC/ER or PCP for follow up    Back Pain/Strain - you were seen in the urgent care today for back pain - your AVS will contain additional information as well in regards to your diagnosis.   -For back pain, we recommend alternating tylenol and Ibuprofen as needed (if you are able to take this medications the recommendation is to alternate every 4 hours as needed. I.e.: Ibuprofen at 8am, Tylenol 12pm, Ibuprofen 4pm, etc.).    -Daily maximum of Tylenol is 4000mg (recommend staying under 3000mg)   -Daily maximum of Ibuprofen is 1200mg (take no more than six 200mg pills a day)  --If prescribed Toradol, you may alternate this with Tylenol every 4-6 hours, please do NOT take any additional antiinflammatories such as Naproxen/Aleve, Motrin/Ibuprofen as these are in the same antiinflammatory class as Toradol  -You may have been placed on a muscle relaxer and/or steroid as well, take these as directed.   -Rest and heat are recommended. You may also try topical Capsaicin - may get hot, so test it on a smaller area of skin  - Be mindful of lifting and posture.  - As soon as tolerated, stretch.  - Consider chiropractor, massage, acupuncture.    * Most back pain will go away on it's own in 6-8 weeks. Follow up with family practice with persistent symptoms in 7-10 days.   ** Must be rechecked with: numbness in groin, loss of bowel or bladder function.

## 2021-10-09 NOTE — NURSING NOTE
"Chief Complaint   Patient presents with     Musculoskeletal Problem     low back pain     Patient is here for low back pain that started Saturday. Patient states he had an old injury and re-injured his back on Saturday. Patient last used 800mg Ibuprofen around 7AM.     Initial BP (!) 144/80   Pulse 77   Temp 97.3  F (36.3  C) (Tympanic)   Resp 18   Ht 1.753 m (5' 9\")   Wt 90.4 kg (199 lb 6.4 oz)   SpO2 99%   BMI 29.45 kg/m   Estimated body mass index is 29.45 kg/m  as calculated from the following:    Height as of this encounter: 1.753 m (5' 9\").    Weight as of this encounter: 90.4 kg (199 lb 6.4 oz).  Medication Reconciliation: complete    Tameka Goncalves LPN  "

## 2021-11-09 NOTE — PROGRESS NOTES
Outpatient Physical Therapy Discharge Note     Patient: Meliton Finnegan  : 1975    Beginning/End Dates of Reporting Period:  2021 to 2021    Referring Provider: Dr. Mccarty    Therapy Diagnosis: Acute bilateral low back pain with left-sided sciatica M54.42     Patient was seen for initial evaluation on 2021.  Patient was able to proceed with independent home exercise program after the initial evaluation.  He did not require any additional follow-up visits.    Objective Measurements: See evaluation on 2021      Goals: Progress towards goals not assessed due to patient being seen for initial evaluation only.    Plan:  Discharge from therapy.    Discharge:    Reason for Discharge: Patient was able to proceed with independent home program after initial evaluation.      Discharge Plan: Patient to continue home program.

## 2021-11-18 ENCOUNTER — APPOINTMENT (OUTPATIENT)
Dept: FAMILY MEDICINE | Facility: OTHER | Age: 46
End: 2021-11-18
Attending: FAMILY MEDICINE

## 2021-11-18 PROCEDURE — 999N001196 HC STATISTIC UA W/ REFLEX TO MICRO GICH: Mod: ZL | Performed by: FAMILY MEDICINE

## 2021-11-18 PROCEDURE — 99499 UNLISTED E&M SERVICE: CPT | Performed by: FAMILY MEDICINE

## 2021-11-23 ENCOUNTER — OFFICE VISIT (OUTPATIENT)
Dept: PSYCHIATRY | Facility: OTHER | Age: 46
End: 2021-11-23
Attending: PSYCHIATRY & NEUROLOGY
Payer: COMMERCIAL

## 2021-11-23 VITALS
OXYGEN SATURATION: 96 % | RESPIRATION RATE: 18 BRPM | SYSTOLIC BLOOD PRESSURE: 118 MMHG | TEMPERATURE: 97.9 F | DIASTOLIC BLOOD PRESSURE: 78 MMHG | WEIGHT: 200.8 LBS | BODY MASS INDEX: 29.65 KG/M2 | HEART RATE: 88 BPM

## 2021-11-23 DIAGNOSIS — F32.A DEPRESSION, UNSPECIFIED DEPRESSION TYPE: Primary | ICD-10-CM

## 2021-11-23 PROCEDURE — 99215 OFFICE O/P EST HI 40 MIN: CPT | Performed by: PSYCHIATRY & NEUROLOGY

## 2021-11-23 RX ORDER — ARIPIPRAZOLE 10 MG/1
10 TABLET ORAL DAILY
Qty: 30 TABLET | Refills: 4 | Status: SHIPPED | OUTPATIENT
Start: 2021-11-23 | End: 2022-09-20

## 2021-11-23 ASSESSMENT — PAIN SCALES - GENERAL: PAINLEVEL: NO PAIN (0)

## 2021-11-23 NOTE — PROGRESS NOTES
"Psychiatric Progress Note/Visit  November 23, 2021    Identifying Data:  This is a 46-year-old man seen for follow-up psychiatric medication management visit for treatment of depression, anxiety and seasonal affective disorder    Interval History:  He was seen most recently on September 8, 2021.  At that time he had stopped his medications on his own approximately a week before his appointment, because he was doing well and felt that he could do well without medications.  On September 17, 2021 he contacted us to tell us that he had restarted his medications.  Today he reports that he is doing better than he was off his medicines, but also said  that he \"feels like I am 75%\".  He reported that he he had been doing well briefly after stopping his medicines, but then says that all of a sudden his emotions changed.    Mental Status Exam:  Anxiety has increased, currently mild.  Depression has also increased, currently mild to moderate.  Remainder of MSE is essentially unchanged from September 8, 2021.    Current Psychiatric Medications:  Trintellix 10 mg a day  Abilify 5 mg a day    Lab Tests/Results:  Patient's weight today was 200 pounds and 8 ounces, weight on September 8 was 202 pounds, weight on July 26 was 202 pounds and 3 ounces, weight on May 27, 2021 was 207 pounds.  Laboratory studies have not yet been done, but have been ordered and I am recommending that these not be done until we have restabilized him on a dosage of Abilify.    Diagnosis:  Major depression, single episode, moderate  Anxiety, unspecified  Seasonal affective disorder    Impression/Assessment:  He regressed fairly quickly after stopping both of his psychiatric medications, with increased anxiety and depression.  He has been back on both of his medicines for little over a month and is much better, but is still not doing as well as he was when he was stable on his medicines.  We discussed the option to increase his Abilify dosage to 10 mg (a " typical dose when this is used as an adjunctive treatment for depression) continue his Trintellix at 10 mg a day.    Plan:  1.  Increase Abilify to 10 mg a day for depression  2.  Continue Trintellix 10 mg a day for depression and anxiety  3.  Full-spectrum light therapy 1 hour a day for SAD  4.  Follow-up with Dr. Padilla in 1 month    Time spent on day of visit 47 minutes-7 minutes (9:22 AM to 9:29 AM)  review of EMR prior to visit, 30 minutes (3:13 PM through 3:43 PM)  face-to-face meeting with patient, including discussion of risk/benefits, alternatives and possible side effects to medication, counseling on above issues, and prescription of medications in the EMR, 10 minutes (3:43 PM through 3:53 PM)  documentation in the EMR      Vinicius Padilla MD

## 2021-11-23 NOTE — NURSING NOTE
"Chief Complaint   Patient presents with     Clinic Care Coordination - Follow-up     medication follow up       Initial /78 (BP Location: Right arm, Patient Position: Sitting, Cuff Size: Adult Large)   Pulse 88   Temp 97.9  F (36.6  C) (Tympanic)   Resp 18   Wt 91.1 kg (200 lb 12.8 oz)   SpO2 96%   BMI 29.65 kg/m   Estimated body mass index is 29.65 kg/m  as calculated from the following:    Height as of 10/9/21: 1.753 m (5' 9\").    Weight as of this encounter: 91.1 kg (200 lb 12.8 oz).  Medication Reconciliation: complete    Umm Caban LPN  "

## 2021-12-04 ENCOUNTER — HEALTH MAINTENANCE LETTER (OUTPATIENT)
Age: 46
End: 2021-12-04

## 2022-01-29 ENCOUNTER — HEALTH MAINTENANCE LETTER (OUTPATIENT)
Age: 47
End: 2022-01-29

## 2022-01-31 ENCOUNTER — OFFICE VISIT (OUTPATIENT)
Dept: PSYCHIATRY | Facility: OTHER | Age: 47
End: 2022-01-31
Attending: PSYCHIATRY & NEUROLOGY
Payer: COMMERCIAL

## 2022-01-31 VITALS
OXYGEN SATURATION: 97 % | RESPIRATION RATE: 16 BRPM | WEIGHT: 200.4 LBS | BODY MASS INDEX: 29.59 KG/M2 | DIASTOLIC BLOOD PRESSURE: 82 MMHG | HEART RATE: 61 BPM | SYSTOLIC BLOOD PRESSURE: 120 MMHG | TEMPERATURE: 96.6 F

## 2022-01-31 DIAGNOSIS — T88.7XXA MEDICATION SIDE EFFECTS: ICD-10-CM

## 2022-01-31 DIAGNOSIS — F32.A DEPRESSION, UNSPECIFIED DEPRESSION TYPE: Primary | ICD-10-CM

## 2022-01-31 LAB
ALBUMIN SERPL-MCNC: 4.8 G/DL (ref 3.5–5.7)
ALP SERPL-CCNC: 40 U/L (ref 34–104)
ALT SERPL W P-5'-P-CCNC: 25 U/L (ref 7–52)
ANION GAP SERPL CALCULATED.3IONS-SCNC: 6 MMOL/L (ref 3–14)
AST SERPL W P-5'-P-CCNC: 16 U/L (ref 13–39)
BASOPHILS # BLD AUTO: 0 10E3/UL (ref 0–0.2)
BASOPHILS NFR BLD AUTO: 0 %
BILIRUB SERPL-MCNC: 0.8 MG/DL (ref 0.3–1)
BUN SERPL-MCNC: 17 MG/DL (ref 7–25)
CALCIUM SERPL-MCNC: 9.8 MG/DL (ref 8.6–10.3)
CHLORIDE BLD-SCNC: 105 MMOL/L (ref 98–107)
CHOLEST SERPL-MCNC: 235 MG/DL
CO2 SERPL-SCNC: 29 MMOL/L (ref 21–31)
CREAT SERPL-MCNC: 1.26 MG/DL (ref 0.7–1.3)
DEPRECATED CALCIDIOL+CALCIFEROL SERPL-MC: 20 UG/L (ref 30–100)
EOSINOPHIL # BLD AUTO: 0.1 10E3/UL (ref 0–0.7)
EOSINOPHIL NFR BLD AUTO: 2 %
ERYTHROCYTE [DISTWIDTH] IN BLOOD BY AUTOMATED COUNT: 12.1 % (ref 10–15)
FASTING STATUS PATIENT QL REPORTED: YES
GFR SERPL CREATININE-BSD FRML MDRD: 72 ML/MIN/1.73M2
GLUCOSE BLD-MCNC: 96 MG/DL (ref 70–105)
HBA1C MFR BLD: 5.4 % (ref 4–6.2)
HCT VFR BLD AUTO: 46.3 % (ref 40–53)
HDLC SERPL-MCNC: 40 MG/DL (ref 23–92)
HGB BLD-MCNC: 16.1 G/DL (ref 13.3–17.7)
IMM GRANULOCYTES # BLD: 0 10E3/UL
IMM GRANULOCYTES NFR BLD: 0 %
LDLC SERPL CALC-MCNC: 148 MG/DL
LYMPHOCYTES # BLD AUTO: 1.3 10E3/UL (ref 0.8–5.3)
LYMPHOCYTES NFR BLD AUTO: 24 %
MCH RBC QN AUTO: 31.8 PG (ref 26.5–33)
MCHC RBC AUTO-ENTMCNC: 34.8 G/DL (ref 31.5–36.5)
MCV RBC AUTO: 92 FL (ref 78–100)
MONOCYTES # BLD AUTO: 0.3 10E3/UL (ref 0–1.3)
MONOCYTES NFR BLD AUTO: 6 %
NEUTROPHILS # BLD AUTO: 3.8 10E3/UL (ref 1.6–8.3)
NEUTROPHILS NFR BLD AUTO: 68 %
NONHDLC SERPL-MCNC: 195 MG/DL
NRBC # BLD AUTO: 0 10E3/UL
NRBC BLD AUTO-RTO: 0 /100
PLATELET # BLD AUTO: 167 10E3/UL (ref 150–450)
POTASSIUM BLD-SCNC: 4.7 MMOL/L (ref 3.5–5.1)
PROT SERPL-MCNC: 6.7 G/DL (ref 6.4–8.9)
RBC # BLD AUTO: 5.06 10E6/UL (ref 4.4–5.9)
SODIUM SERPL-SCNC: 140 MMOL/L (ref 134–144)
TRIGL SERPL-MCNC: 237 MG/DL
WBC # BLD AUTO: 5.6 10E3/UL (ref 4–11)

## 2022-01-31 PROCEDURE — 82306 VITAMIN D 25 HYDROXY: CPT | Mod: ZL | Performed by: PSYCHIATRY & NEUROLOGY

## 2022-01-31 PROCEDURE — 83036 HEMOGLOBIN GLYCOSYLATED A1C: CPT | Mod: ZL | Performed by: PSYCHIATRY & NEUROLOGY

## 2022-01-31 PROCEDURE — 80061 LIPID PANEL: CPT | Mod: ZL | Performed by: PSYCHIATRY & NEUROLOGY

## 2022-01-31 PROCEDURE — 99215 OFFICE O/P EST HI 40 MIN: CPT | Performed by: PSYCHIATRY & NEUROLOGY

## 2022-01-31 PROCEDURE — 36415 COLL VENOUS BLD VENIPUNCTURE: CPT | Mod: ZL | Performed by: PSYCHIATRY & NEUROLOGY

## 2022-01-31 PROCEDURE — 82040 ASSAY OF SERUM ALBUMIN: CPT | Mod: ZL | Performed by: PSYCHIATRY & NEUROLOGY

## 2022-01-31 PROCEDURE — 80053 COMPREHEN METABOLIC PANEL: CPT | Mod: ZL | Performed by: PSYCHIATRY & NEUROLOGY

## 2022-01-31 PROCEDURE — 85025 COMPLETE CBC W/AUTO DIFF WBC: CPT | Mod: ZL | Performed by: PSYCHIATRY & NEUROLOGY

## 2022-01-31 RX ORDER — DESIPRAMINE HYDROCHLORIDE 25 MG/1
25 TABLET ORAL 2 TIMES DAILY
Qty: 60 TABLET | Refills: 4 | Status: SHIPPED | OUTPATIENT
Start: 2022-01-31 | End: 2022-09-20

## 2022-01-31 ASSESSMENT — PATIENT HEALTH QUESTIONNAIRE - PHQ9
SUM OF ALL RESPONSES TO PHQ QUESTIONS 1-9: 2
SUM OF ALL RESPONSES TO PHQ QUESTIONS 1-9: 2
10. IF YOU CHECKED OFF ANY PROBLEMS, HOW DIFFICULT HAVE THESE PROBLEMS MADE IT FOR YOU TO DO YOUR WORK, TAKE CARE OF THINGS AT HOME, OR GET ALONG WITH OTHER PEOPLE: SOMEWHAT DIFFICULT

## 2022-01-31 ASSESSMENT — ANXIETY QUESTIONNAIRES
2. NOT BEING ABLE TO STOP OR CONTROL WORRYING: NOT AT ALL
GAD7 TOTAL SCORE: 0
7. FEELING AFRAID AS IF SOMETHING AWFUL MIGHT HAPPEN: NOT AT ALL
GAD7 TOTAL SCORE: 0
1. FEELING NERVOUS, ANXIOUS, OR ON EDGE: NOT AT ALL
4. TROUBLE RELAXING: NOT AT ALL
3. WORRYING TOO MUCH ABOUT DIFFERENT THINGS: NOT AT ALL
7. FEELING AFRAID AS IF SOMETHING AWFUL MIGHT HAPPEN: NOT AT ALL
6. BECOMING EASILY ANNOYED OR IRRITABLE: NOT AT ALL
GAD7 TOTAL SCORE: 0
5. BEING SO RESTLESS THAT IT IS HARD TO SIT STILL: NOT AT ALL

## 2022-01-31 ASSESSMENT — PAIN SCALES - GENERAL: PAINLEVEL: NO PAIN (0)

## 2022-01-31 NOTE — PATIENT INSTRUCTIONS
Desipramine 25 mg:  Start with 1 tablet in the morning for 3-7 days,  Then increase to 2 tablets a day (all in the morning or divided into 1 tablet twice a day)  After 1-2 weeks, if you are not starting to feel little bit better, increase to 3 tablets (75 mg) a day,  All at once or divided dosing

## 2022-01-31 NOTE — NURSING NOTE
"Patient presents to the clinic today to follow up medications and adjustment disorder.  Sailaja Licona LPN 1/31/2022   1:35 PM    Chief Complaint   Patient presents with     RECHECK     Medications, adjustment disorder       Initial /82 (BP Location: Right arm, Patient Position: Sitting, Cuff Size: Adult Regular)   Pulse 61   Temp (!) 96.6  F (35.9  C) (Tympanic)   Resp 16   Wt 90.9 kg (200 lb 6.4 oz)   SpO2 97%   BMI 29.59 kg/m   Estimated body mass index is 29.59 kg/m  as calculated from the following:    Height as of 10/9/21: 1.753 m (5' 9\").    Weight as of this encounter: 90.9 kg (200 lb 6.4 oz).  Medication Reconciliation: complete  Sailaja Licona LPN    "

## 2022-01-31 NOTE — PROGRESS NOTES
"Psychiatric Progress Note/Visit  January 31, 2022    Identifying Data:  This is a 46-year-old man seen for follow-up psychiatric medication management visit for treatment of depression, anxiety and seasonal affective disorder    Interval History:  He was seen most recently on November 23, 2021.  At that time he had stopped all his own medications on his own in September, because he complained of feeling \"numb\" on his medicines.  However he started to not like how he felt and restarted his medicines again in the middle of September.  At the time of our most recent visit he says that he was starting to do better than he was doing without his medicines and described himself as being at \"75%\".  We decided to increase his Abilify to 10 mg a day, continue Trintellix 10 mg a day and start using full-spectrum light therapy 1 hour a day for seasonal affective disorder.  Today he reports that he felt worse on all the increased dosages of medications, including trying to increase Trintellix up to 20 mg a day.  He has gone back to taking 5 mg of Abilify and 10 mg of Trintellix daily.  He is still getting some sexual side effects on the Trintellix and describes himself as just getting by.  He notes that he is not enjoying much of anything, but does not have significant anxiety.    Mental Status Exam:  Anxiety has decreased slightly, currently minimal to mild.  Depression has also decreased slightly, currently mild to moderate.  Remainder of MSE is essentially unchanged from November 23, 2021.    Current Psychiatric Medications:  Abilify 5 mg a day  Trintellix 10 mg a day    Lab Tests/Results:  His weight today was 200 pounds and 4 ounces.  Weight on November 23, 2021 was 200 pounds and 8 ounces, weight on September 8 202 pounds, weight on July 26 was 202 pounds and 3 ounces weight on May 27, 2021 was 207 pounds.  Fasting blood work was ordered be done today, including the following: Lipid panel, CMP, CBC with differential and " platelets, hemoglobin A1c, thyroid panel, vitamin D level    Diagnosis:  Major depression, single episode, moderate  Anxiety, unspecified  Seasonal affective disorder    Impression/Assessment:  He could not tolerate increased dosages of Abilify and Trintellix and has decreased them back to tolerable levels.  However he still has sexual side effects and still has significant depression.  We reviewed problems with sexual side effects from every serotonin medicine he has tried.  We discussed trying low-dose desipramine and if well tolerated and effective we will consider coming off of Trintellix, but maintaining Abilify at 5 mg a day.    Plan:  1.   Add desipramine, 50-75 mg a day, for depression and anxiety  2.   Continue Trintellix 10 mg a day for depression and anxiety  3.   Continue Abilify 5 mg a day for depression  4.   Continue full-spectrum light therapy 1 hour a day through at least March  5.   Fasting blood work as noted above  6.   Screening EKG prior to starting tricyclic antidepressant treatment  7.   Follow-up with Dr. Padilla in 1 month or next available appointment after that    Time spent on day of visit 57 minutes- - 10 minutes (8:11 AM - 8:21 AM)  review of EMR prior to visit, 37 minutes (1:36 PM through 2:13 PM)  face-to-face meeting with patient, including discussion of risk/benefits, alternatives and possible side effects to medication, verbal and written instructions on medication dosage adjustments, counseling on above issues, and prescription of medications in the EMR, 10 minutes (2:13 PM through 2:23 PM)  documentation in the EMR      Vinicius Padilla MD    Answers for HPI/ROS submitted by the patient on 1/31/2022  If you checked off any problems, how difficult have these problems made it for you to do your work, take care of things at home, or get along with other people?: Somewhat difficult  PHQ9 TOTAL SCORE: 2  LEANA 7 TOTAL SCORE: 0

## 2022-02-01 ASSESSMENT — ANXIETY QUESTIONNAIRES: GAD7 TOTAL SCORE: 0

## 2022-02-01 ASSESSMENT — PATIENT HEALTH QUESTIONNAIRE - PHQ9: SUM OF ALL RESPONSES TO PHQ QUESTIONS 1-9: 2

## 2022-04-12 ENCOUNTER — OFFICE VISIT (OUTPATIENT)
Dept: PSYCHIATRY | Facility: OTHER | Age: 47
End: 2022-04-12
Attending: PSYCHIATRY & NEUROLOGY
Payer: COMMERCIAL

## 2022-04-12 VITALS
RESPIRATION RATE: 12 BRPM | HEART RATE: 68 BPM | OXYGEN SATURATION: 100 % | TEMPERATURE: 97 F | DIASTOLIC BLOOD PRESSURE: 72 MMHG | BODY MASS INDEX: 29.45 KG/M2 | SYSTOLIC BLOOD PRESSURE: 132 MMHG | WEIGHT: 199.4 LBS

## 2022-04-12 DIAGNOSIS — N52.9 ERECTILE DYSFUNCTION, UNSPECIFIED ERECTILE DYSFUNCTION TYPE: Primary | ICD-10-CM

## 2022-04-12 DIAGNOSIS — Z13.9 SCREENING FOR CONDITION: ICD-10-CM

## 2022-04-12 LAB
ATRIAL RATE - MUSE: 79 BPM
DIASTOLIC BLOOD PRESSURE - MUSE: NORMAL MMHG
INTERPRETATION ECG - MUSE: NORMAL
P AXIS - MUSE: 43 DEGREES
PR INTERVAL - MUSE: 132 MS
QRS DURATION - MUSE: 74 MS
QT - MUSE: 366 MS
QTC - MUSE: 419 MS
R AXIS - MUSE: -12 DEGREES
SYSTOLIC BLOOD PRESSURE - MUSE: NORMAL MMHG
T AXIS - MUSE: 25 DEGREES
VENTRICULAR RATE- MUSE: 79 BPM

## 2022-04-12 PROCEDURE — 93000 ELECTROCARDIOGRAM COMPLETE: CPT | Performed by: INTERNAL MEDICINE

## 2022-04-12 PROCEDURE — 99215 OFFICE O/P EST HI 40 MIN: CPT | Performed by: PSYCHIATRY & NEUROLOGY

## 2022-04-12 RX ORDER — SILDENAFIL 100 MG/1
100 TABLET, FILM COATED ORAL DAILY PRN
Qty: 30 TABLET | Refills: 1 | Status: SHIPPED | OUTPATIENT
Start: 2022-04-12 | End: 2022-09-20

## 2022-04-12 ASSESSMENT — PATIENT HEALTH QUESTIONNAIRE - PHQ9
SUM OF ALL RESPONSES TO PHQ QUESTIONS 1-9: 0
SUM OF ALL RESPONSES TO PHQ QUESTIONS 1-9: 0
10. IF YOU CHECKED OFF ANY PROBLEMS, HOW DIFFICULT HAVE THESE PROBLEMS MADE IT FOR YOU TO DO YOUR WORK, TAKE CARE OF THINGS AT HOME, OR GET ALONG WITH OTHER PEOPLE: NOT DIFFICULT AT ALL

## 2022-04-12 ASSESSMENT — ANXIETY QUESTIONNAIRES
1. FEELING NERVOUS, ANXIOUS, OR ON EDGE: NOT AT ALL
7. FEELING AFRAID AS IF SOMETHING AWFUL MIGHT HAPPEN: NOT AT ALL
7. FEELING AFRAID AS IF SOMETHING AWFUL MIGHT HAPPEN: NOT AT ALL
6. BECOMING EASILY ANNOYED OR IRRITABLE: NOT AT ALL
2. NOT BEING ABLE TO STOP OR CONTROL WORRYING: NOT AT ALL
GAD7 TOTAL SCORE: 0
GAD7 TOTAL SCORE: 0
5. BEING SO RESTLESS THAT IT IS HARD TO SIT STILL: NOT AT ALL
3. WORRYING TOO MUCH ABOUT DIFFERENT THINGS: NOT AT ALL
4. TROUBLE RELAXING: NOT AT ALL
GAD7 TOTAL SCORE: 0

## 2022-04-12 ASSESSMENT — PAIN SCALES - GENERAL: PAINLEVEL: NO PAIN (0)

## 2022-04-12 NOTE — PROGRESS NOTES
"Psychiatric Progress Note/Visit  April 12, 2022    Identifying Data:  This is a 47-year-old man seen for follow-up psychiatric medication management visit for treatment of depression, anxiety and SAD    Interval History:  He was seen most recently on January 31, 2022.  At that time he reported that he felt worse when he increased the dosages of Trintellix and Abilify, and he went back to taking 5 mg of Abilify and 10 mg of Trintellix.  He still reported some sexual side effects.  He did note that anxiety had decreased to the point where there was no significant problem, but he was still experiencing depression and reported that he could not enjoy anything.  We decided to add a trial of low-dose desipramine and continue his other medications unchanged.  Today he reports that he stopped taking the Trintellix, but continued to take 5 mg of Abilify and has been taking only 25 mg of desipramine also.  He reports no adverse effects and says \"I think I am doing well\".  He did discuss some problems with ED and I told him that I would prescribe a starting prescription of Viagra, but he would need to follow with Dr. Mccarty, his PCP, for further evaluation and treatment.  He also talked briefly about being tired of the long winter.  He reported that he was using his full-spectrum light fairly regularly earlier, but more recently has been inconsistent.    Mental Status Exam:  Anxiety continues to decrease, currently minimal to absent.  Depression has decreased, currently minimal.  Remainder of MSE is essentially unchanged from January 31, 2022.    Current Psychiatric Medications:  Abilify 5 mg a day  Desipramine 25 mg a day    Lab Tests/Results:  Blood work was done on January 31, 2022 with the following results: Hemoglobin A1c 5.4, CBC with differential and platelets within normal limits, CMP within normal limits.  Lipid panel showed the following increases: Cholesterol 235, triglycerides 237, , non-HDL cholesterol 195.  " Vitamin D level was 20-insufficient.    Diagnosis:  Major depression, single episode, moderate  Anxiety, unspecified  SAD  Vitamin D insufficiency  ED    Impression/Assessment:  He has had a very positive response to the addition of low-dose desipramine with significant decrease in his depression.  He has not had any negative effect from stopping Trintellix and he continues to do well on low-dose Abilify.  His lipid panel showed many elevations and I have recommended that he he discuss this with his PCP, as well as discussing evaluation and treatment of  ED. I have also reordered an EKG, that will be done today.  I reviewed an EKG in the chart from 2018 and it was considered normal with no elevation of QTc.    Plan:  1.   Continue desipramine 25 mg and Abilify 5 mg for depression and anxiety  2.   Trial of Viagra,  mg, for ED  3.   Follow-up with Dr. Padilla in 3 months or sooner if needed    Time spent on day of visit 48 minutes-12 minutes (7:40 AM through 7:52 AM) review of EMR prior to visit, 23 minutes (10:47 AM through 11:10 AM) face-to-face meeting with patient, including discussion of risk/benefits, alternatives and possible side effects to medication, counseling on above issues, and prescription of medications in the EMR, 13 minutes (11:10 AM through 11:23 AM) documentation in the EMR      Vinicius Padilla MD    Answers for HPI/ROS submitted by the patient on 4/12/2022  If you checked off any problems, how difficult have these problems made it for you to do your work, take care of things at home, or get along with other people?: Not difficult at all  PHQ9 TOTAL SCORE: 0  LEANA 7 TOTAL SCORE: 0

## 2022-04-12 NOTE — NURSING NOTE
Chief Complaint   Patient presents with     Follow Up         Medication Reconciliation: amy Kent

## 2022-04-13 ASSESSMENT — ANXIETY QUESTIONNAIRES: GAD7 TOTAL SCORE: 0

## 2022-04-13 ASSESSMENT — PATIENT HEALTH QUESTIONNAIRE - PHQ9: SUM OF ALL RESPONSES TO PHQ QUESTIONS 1-9: 0

## 2022-07-18 ENCOUNTER — OFFICE VISIT (OUTPATIENT)
Dept: PSYCHIATRY | Facility: OTHER | Age: 47
End: 2022-07-18
Attending: PSYCHIATRY & NEUROLOGY
Payer: COMMERCIAL

## 2022-07-18 VITALS
OXYGEN SATURATION: 96 % | BODY MASS INDEX: 29.44 KG/M2 | HEIGHT: 69 IN | HEART RATE: 88 BPM | TEMPERATURE: 97.9 F | RESPIRATION RATE: 16 BRPM | DIASTOLIC BLOOD PRESSURE: 82 MMHG | SYSTOLIC BLOOD PRESSURE: 132 MMHG | WEIGHT: 198.8 LBS

## 2022-07-18 DIAGNOSIS — Z13.220 SCREENING FOR HYPERLIPIDEMIA: ICD-10-CM

## 2022-07-18 DIAGNOSIS — E55.9 VITAMIN D DEFICIENCY: ICD-10-CM

## 2022-07-18 DIAGNOSIS — R73.9 HYPERGLYCEMIA: ICD-10-CM

## 2022-07-18 DIAGNOSIS — T88.7XXA MEDICATION SIDE EFFECTS: Primary | ICD-10-CM

## 2022-07-18 PROCEDURE — 99215 OFFICE O/P EST HI 40 MIN: CPT | Performed by: PSYCHIATRY & NEUROLOGY

## 2022-07-18 ASSESSMENT — ANXIETY QUESTIONNAIRES
GAD7 TOTAL SCORE: 0
8. IF YOU CHECKED OFF ANY PROBLEMS, HOW DIFFICULT HAVE THESE MADE IT FOR YOU TO DO YOUR WORK, TAKE CARE OF THINGS AT HOME, OR GET ALONG WITH OTHER PEOPLE?: NOT DIFFICULT AT ALL
6. BECOMING EASILY ANNOYED OR IRRITABLE: NOT AT ALL
1. FEELING NERVOUS, ANXIOUS, OR ON EDGE: NOT AT ALL
2. NOT BEING ABLE TO STOP OR CONTROL WORRYING: NOT AT ALL
4. TROUBLE RELAXING: NOT AT ALL
GAD7 TOTAL SCORE: 0
5. BEING SO RESTLESS THAT IT IS HARD TO SIT STILL: NOT AT ALL
7. FEELING AFRAID AS IF SOMETHING AWFUL MIGHT HAPPEN: NOT AT ALL
7. FEELING AFRAID AS IF SOMETHING AWFUL MIGHT HAPPEN: NOT AT ALL
GAD7 TOTAL SCORE: 0
3. WORRYING TOO MUCH ABOUT DIFFERENT THINGS: NOT AT ALL

## 2022-07-18 ASSESSMENT — PATIENT HEALTH QUESTIONNAIRE - PHQ9
10. IF YOU CHECKED OFF ANY PROBLEMS, HOW DIFFICULT HAVE THESE PROBLEMS MADE IT FOR YOU TO DO YOUR WORK, TAKE CARE OF THINGS AT HOME, OR GET ALONG WITH OTHER PEOPLE: NOT DIFFICULT AT ALL
SUM OF ALL RESPONSES TO PHQ QUESTIONS 1-9: 1
SUM OF ALL RESPONSES TO PHQ QUESTIONS 1-9: 1

## 2022-07-18 ASSESSMENT — PAIN SCALES - GENERAL: PAINLEVEL: NO PAIN (0)

## 2022-07-18 NOTE — NURSING NOTE
"Chief Complaint   Patient presents with     RECHECK     Adjustment disorder         Initial BP (!) 132/90   Pulse 88   Temp 97.9  F (36.6  C) (Temporal)   Resp 16   Ht 1.753 m (5' 9\")   Wt 90.2 kg (198 lb 12.8 oz)   SpO2 96%   BMI 29.36 kg/m   Estimated body mass index is 29.36 kg/m  as calculated from the following:    Height as of this encounter: 1.753 m (5' 9\").    Weight as of this encounter: 90.2 kg (198 lb 12.8 oz).         Norma J. Gosselin, LPN   "

## 2022-07-18 NOTE — PROGRESS NOTES
"Psychiatric Progress Note/Visit  July 18, 2022    Identifying Data:  This is a 47-year-old man seen for follow-up psychiatric medication management visit for treatment of depression and anxiety    Interval History:  He was seen most recently on April 12, 2022.  At that time he reported that he was \"doing well\" on 25 mg of desipramine and 5 mg of Abilify daily.  He also reported some problems with ED.  A one-time trial prescription of Viagra was ordered and his psychiatric medications were continued at the same dosages.  Today he reports that he continues to do reasonably well, but reports that he has less motivation and less enjoyment when he is engaged in pleasurable activities.  We discussed increasing Abilify and possibly further increasing desipramine.  He also reports that he used Viagra several times with good effects, but has not gone through the entire prescription.  He is encouraged to contact Dr. Mccarty for any refills.    Mental Status Exam:  Anxiety remains minimal to absent, depression has increased slightly, but still minimal.  Remainder of MSE is essentially unchanged from April 12, 2022.    Current Psychiatric Medications:  Abilify 5 mg a day  Desipramine 25 mg a day    Lab Tests/Results:  Weight today was 198 pounds and 8 ounces.  Weight on April 12, 2022 was 199 pounds and 6 ounces, weight on January 31, 2022 was 200 pounds and 4 ounces, weight on September 8, 2021 was 202 pounds, weight on May 27, 2021 was 207 pounds.  Fasting blood work was last done on January 31, 2022.  The following fasting blood work was ordered today, to be done prior to next visit: Lipid panel, CMP, CBC with differential and platelets, hemoglobin A1c, vitamin D level.    Diagnosis:  Major depression, single episode, moderate  Anxiety, unspecified  Seasonal affective disorder  History of vitamin D deficiency  History of ED    Impression/Assessment:  He continues to do reasonly well on low dosages of his 2 current psychiatric " medications.  However it does appear that his depression has become slightly worse and we discussed increasing Abilify and then after 2-4+ weeks possibly increasing desipramine    Plan:  1.   Increase Abilify to 10 mg daily for depression  2.   Continue desipramine 25 mg a day for depression and anxiety  3.   Fasting blood work, as noted above  4.   Follow-up with Dr. Padilla in 1-2 months.    Time spent on day of visit 44 minutes - 10 minutes (7:17 AM through 7:27 AM) review of EMR prior to visit, 24 minutes (8:35 AM through 8:59 AM) face-to-face meeting with patient, including discussion of risk/benefits, alternatives and possible side effects to medication, counseling on above issues, and review of medications in the EMR, 10 minutes (8:59 AM through 9:09 AM) documentation in the EMR      Vinicius Padilla MD    Answers for HPI/ROS submitted by the patient on 7/18/2022  If you checked off any problems, how difficult have these problems made it for you to do your work, take care of things at home, or get along with other people?: Not difficult at all  PHQ9 TOTAL SCORE: 1  LEANA 7 TOTAL SCORE: 0

## 2022-09-17 ENCOUNTER — HEALTH MAINTENANCE LETTER (OUTPATIENT)
Age: 47
End: 2022-09-17

## 2022-09-20 ENCOUNTER — OFFICE VISIT (OUTPATIENT)
Dept: PSYCHIATRY | Facility: OTHER | Age: 47
End: 2022-09-20
Attending: PSYCHIATRY & NEUROLOGY
Payer: COMMERCIAL

## 2022-09-20 VITALS
BODY MASS INDEX: 29.27 KG/M2 | SYSTOLIC BLOOD PRESSURE: 128 MMHG | TEMPERATURE: 97.8 F | OXYGEN SATURATION: 95 % | DIASTOLIC BLOOD PRESSURE: 82 MMHG | HEART RATE: 88 BPM | RESPIRATION RATE: 16 BRPM | WEIGHT: 198.2 LBS

## 2022-09-20 DIAGNOSIS — E55.9 VITAMIN D DEFICIENCY: Primary | ICD-10-CM

## 2022-09-20 PROCEDURE — 99215 OFFICE O/P EST HI 40 MIN: CPT | Performed by: PSYCHIATRY & NEUROLOGY

## 2022-09-20 RX ORDER — CHOLECALCIFEROL (VITAMIN D3) 50 MCG
1 TABLET ORAL DAILY
Qty: 30 TABLET | Refills: 5 | Status: SHIPPED | OUTPATIENT
Start: 2022-09-20 | End: 2023-03-28

## 2022-09-20 ASSESSMENT — ANXIETY QUESTIONNAIRES
3. WORRYING TOO MUCH ABOUT DIFFERENT THINGS: NOT AT ALL
2. NOT BEING ABLE TO STOP OR CONTROL WORRYING: NOT AT ALL
6. BECOMING EASILY ANNOYED OR IRRITABLE: NOT AT ALL
GAD7 TOTAL SCORE: 0
1. FEELING NERVOUS, ANXIOUS, OR ON EDGE: NOT AT ALL
8. IF YOU CHECKED OFF ANY PROBLEMS, HOW DIFFICULT HAVE THESE MADE IT FOR YOU TO DO YOUR WORK, TAKE CARE OF THINGS AT HOME, OR GET ALONG WITH OTHER PEOPLE?: NOT DIFFICULT AT ALL
7. FEELING AFRAID AS IF SOMETHING AWFUL MIGHT HAPPEN: NOT AT ALL
GAD7 TOTAL SCORE: 0
4. TROUBLE RELAXING: NOT AT ALL
5. BEING SO RESTLESS THAT IT IS HARD TO SIT STILL: NOT AT ALL
7. FEELING AFRAID AS IF SOMETHING AWFUL MIGHT HAPPEN: NOT AT ALL
IF YOU CHECKED OFF ANY PROBLEMS ON THIS QUESTIONNAIRE, HOW DIFFICULT HAVE THESE PROBLEMS MADE IT FOR YOU TO DO YOUR WORK, TAKE CARE OF THINGS AT HOME, OR GET ALONG WITH OTHER PEOPLE: NOT DIFFICULT AT ALL
GAD7 TOTAL SCORE: 0

## 2022-09-20 ASSESSMENT — PATIENT HEALTH QUESTIONNAIRE - PHQ9
10. IF YOU CHECKED OFF ANY PROBLEMS, HOW DIFFICULT HAVE THESE PROBLEMS MADE IT FOR YOU TO DO YOUR WORK, TAKE CARE OF THINGS AT HOME, OR GET ALONG WITH OTHER PEOPLE: NOT DIFFICULT AT ALL
SUM OF ALL RESPONSES TO PHQ QUESTIONS 1-9: 0
SUM OF ALL RESPONSES TO PHQ QUESTIONS 1-9: 0

## 2022-09-20 ASSESSMENT — PAIN SCALES - GENERAL: PAINLEVEL: NO PAIN (0)

## 2022-09-20 NOTE — NURSING NOTE
"Chief Complaint   Patient presents with     Recheck Medication     Follow up. Not taking meds, feeling good.           Initial /82   Pulse 88   Temp 97.8  F (36.6  C) (Tympanic)   Resp 16   Wt 89.9 kg (198 lb 3.2 oz)   SpO2 95%   BMI 29.27 kg/m   Estimated body mass index is 29.27 kg/m  as calculated from the following:    Height as of 7/18/22: 1.753 m (5' 9\").    Weight as of this encounter: 89.9 kg (198 lb 3.2 oz).     FOOD SECURITY SCREENING QUESTIONS:    The next two questions are to help us understand your food security.  If you are feeling you need any assistance in this area, we have resources available to support you today.    Hunger Vital Signs:  Within the past 12 months we worried whether our food would run out before we got money to buy more. Never  Within the past 12 months the food we bought just didn't last and we didn't have money to get more. Never      Namita A Horace   "

## 2022-09-20 NOTE — PROGRESS NOTES
"Psychiatric Progress Note/Visit  September 20, 2022    Identifying Data:  This is a 47-year-old man seen for follow-up psychiatric medication management visit for treatment of depression, anxiety and seasonal affective disorder    Interval History:  He was seen most recently on July 18, 2022.  At that time he reported that he was doing reasonably well, but complained of decreased motivation and decreased enjoyment.  We increased Abilify from 5 mg to 10 mg daily and continued desipramine at 25 mg a day.  Today he reports that he stopped all his medications about 3 weeks ago.  He says that \"I felt good and thought they weren't   doing me any good\".  He reports today that he still feels well with no return of depression or anxiety.    Mental Status Exam:  Anxiety remains minimal to absent, depression has decreased and is currently minimal to absent.  Remainder of MSE is essentially unchanged from July 18, 2022.    Current Psychiatric Medications:  Currently none  Previously he was taking 10 mg of Abilify and 25 mg of desipramine daily    Lab Tests/Results:  Laboratory studies were ordered on July 18, 2022, but were not done.  His weight today was 198 pounds and 2 ounces, weight on July 18, 2022 was 198 pounds and 8 ounces, weight on April 12, 2022 was 199 pounds and 6 ounces, weight on January 31, 2022 was 200 pounds and 4 ounces, weight on September 8, 2021 was 202 pounds, weight on May 27, 2021 was 207.    Diagnosis:  Major depression, single episode, mild, in early remission  Anxiety, unspecified  Seasonal affective disorder  History of vitamin D deficiency    Impression/Assessment:  Meliton had decided to stop all of his medications on his own.  He continues to do well after about 3 weeks off of medication.  We had a long discussion about medication half-lives and major depression.  We also discussed restarting vitamin D therapy and restarting full-spectrum light therapy.    Plan:  1.   Continue to observe off of " Abilify and desipramine, with the option to restart Abilify first and then add desipramine if needed.  2.   Start using his full-spectrum light 1 hour a day, 1-2 feet away, for seasonal affective disorder  3.   Start taking vitamin D3 2000 international units daily for vitamin D deficiency  4.   Follow-up appointment with Dr. Padilla has already been scheduled for approximately 4 weeks, October 18, 2022.      Time spent on day of visit 50 minutes- 12 minutes (8:29 AM through 8:41 AM) review of EMR prior to visit, 27 minutes (11:41 AM through 12:08 PM) face-to-face meeting with patient, including discussion of risk/benefits, alternatives and possible side effects to medication, counseling on above issues, and prescription of medications in the EMR, 11 minutes (12:08 PM through 12:19 PM) documentation in the EMR      Vinicius Padilla MD    Answers for HPI/ROS submitted by the patient on 9/20/2022  If you checked off any problems, how difficult have these problems made it for you to do your work, take care of things at home, or get along with other people?: Not difficult at all  PHQ9 TOTAL SCORE: 0  LEANA 7 TOTAL SCORE: 0

## 2022-10-07 NOTE — ED NOTES
Patient informed per Dr. Lugo, that we are unable to get his MRI results from [facility] and that our assessment and treatment plan would be based on doing lab work and our own testing. Patient states that he has already had all of this done and he just wants his MRI results. Informed patient that the best way to go about this would be to present to [facility]. States he will leave and go there. Notified Dr. Lugo of patient's decision.    
No

## 2022-10-10 ENCOUNTER — OFFICE VISIT (OUTPATIENT)
Dept: FAMILY MEDICINE | Facility: OTHER | Age: 47
End: 2022-10-10
Attending: FAMILY MEDICINE
Payer: COMMERCIAL

## 2022-10-10 VITALS
TEMPERATURE: 97.3 F | SYSTOLIC BLOOD PRESSURE: 130 MMHG | DIASTOLIC BLOOD PRESSURE: 84 MMHG | BODY MASS INDEX: 29.95 KG/M2 | OXYGEN SATURATION: 98 % | RESPIRATION RATE: 16 BRPM | WEIGHT: 202.8 LBS | HEART RATE: 87 BPM

## 2022-10-10 DIAGNOSIS — N52.9 ERECTILE DYSFUNCTION, UNSPECIFIED ERECTILE DYSFUNCTION TYPE: Primary | ICD-10-CM

## 2022-10-10 PROCEDURE — 99213 OFFICE O/P EST LOW 20 MIN: CPT | Performed by: FAMILY MEDICINE

## 2022-10-10 RX ORDER — SILDENAFIL 100 MG/1
100 TABLET, FILM COATED ORAL DAILY PRN
Qty: 30 TABLET | Refills: 11 | Status: SHIPPED | OUTPATIENT
Start: 2022-10-10 | End: 2023-11-17

## 2022-10-10 ASSESSMENT — ANXIETY QUESTIONNAIRES
IF YOU CHECKED OFF ANY PROBLEMS ON THIS QUESTIONNAIRE, HOW DIFFICULT HAVE THESE PROBLEMS MADE IT FOR YOU TO DO YOUR WORK, TAKE CARE OF THINGS AT HOME, OR GET ALONG WITH OTHER PEOPLE: NOT DIFFICULT AT ALL
7. FEELING AFRAID AS IF SOMETHING AWFUL MIGHT HAPPEN: NOT AT ALL
GAD7 TOTAL SCORE: 0
3. WORRYING TOO MUCH ABOUT DIFFERENT THINGS: NOT AT ALL
1. FEELING NERVOUS, ANXIOUS, OR ON EDGE: NOT AT ALL
8. IF YOU CHECKED OFF ANY PROBLEMS, HOW DIFFICULT HAVE THESE MADE IT FOR YOU TO DO YOUR WORK, TAKE CARE OF THINGS AT HOME, OR GET ALONG WITH OTHER PEOPLE?: NOT DIFFICULT AT ALL
4. TROUBLE RELAXING: NOT AT ALL
5. BEING SO RESTLESS THAT IT IS HARD TO SIT STILL: NOT AT ALL
GAD7 TOTAL SCORE: 0
7. FEELING AFRAID AS IF SOMETHING AWFUL MIGHT HAPPEN: NOT AT ALL
2. NOT BEING ABLE TO STOP OR CONTROL WORRYING: NOT AT ALL
GAD7 TOTAL SCORE: 0
6. BECOMING EASILY ANNOYED OR IRRITABLE: NOT AT ALL

## 2022-10-10 ASSESSMENT — PATIENT HEALTH QUESTIONNAIRE - PHQ9
SUM OF ALL RESPONSES TO PHQ QUESTIONS 1-9: 0
SUM OF ALL RESPONSES TO PHQ QUESTIONS 1-9: 0

## 2022-10-10 NOTE — PROGRESS NOTES
"  Assessment & Plan     (N52.9) Erectile dysfunction, unspecified erectile dysfunction type  (primary encounter diagnosis)  Comment: stable  Plan: sildenafil (VIAGRA) 100 MG tablet        refilled               BMI:   Estimated body mass index is 29.95 kg/m  as calculated from the following:    Height as of 7/18/22: 1.753 m (5' 9\").    Weight as of this encounter: 92 kg (202 lb 12.8 oz).   Weight management plan: Discussed healthy diet and exercise guidelines        No follow-ups on file.    Tolu Mccarty MD  [facility] AND [facility]    Js Coelho is a 47 year old, presenting for the following health issues:  Recheck Medication      History of Present Illness       Mental Health Follow-up:  Patient presents to follow-up on Depression & Anxiety.Patient's depression since last visit has been:  Good  The patient is not having other symptoms associated with depression.  Patient's anxiety since last visit has been:  Good  The patient is not having other symptoms associated with anxiety.  Any significant life events: No  Patient is not feeling anxious or having panic attacks.  Patient has no concerns about alcohol or drug use.    He eats 2-3 servings of fruits and vegetables daily.He consumes 1 sweetened beverage(s) daily.He exercises with enough effort to increase his heart rate 9 or less minutes per day.  He exercises with enough effort to increase his heart rate 3 or less days per week.   He is taking medications regularly.    Today's PHQ-9         PHQ-9 Total Score: 0    PHQ-9 Q9 Thoughts of better off dead/self-harm past 2 weeks :   Not at all      Today's LEANA-7 Score: 0     He has concerns about erectile dysfunction.  Sees a psychiatrist who had gave him viagra once and was told to follow up with me for refills.  This worked well. Has had a long time GF.    The 10-year ASCVD risk score (Gil BHATT, et al., 2019) is: 3.6%    Values used to calculate the score:      Age: 47 years      Sex: Male      " Is Non- : No      Diabetic: No      Tobacco smoker: No      Systolic Blood Pressure: 130 mmHg      Is BP treated: No      HDL Cholesterol: 40 mg/dL      Total Cholesterol: 235 mg/dL          Review of Systems         Objective    /84   Pulse 87   Temp 97.3  F (36.3  C) (Tympanic)   Resp 16   Wt 92 kg (202 lb 12.8 oz)   SpO2 98%   BMI 29.95 kg/m    Body mass index is 29.95 kg/m .  Physical Exam  Constitutional:       Appearance: Normal appearance.   Neurological:      General: No focal deficit present.      Mental Status: He is alert and oriented to person, place, and time.   Psychiatric:         Mood and Affect: Mood normal.         Behavior: Behavior normal.         Thought Content: Thought content normal.

## 2022-10-10 NOTE — NURSING NOTE
"Chief Complaint   Patient presents with     Recheck Medication       Initial /84   Pulse 87   Temp 97.3  F (36.3  C) (Tympanic)   Resp 16   Wt 92 kg (202 lb 12.8 oz)   SpO2 98%   BMI 29.95 kg/m   Estimated body mass index is 29.95 kg/m  as calculated from the following:    Height as of 7/18/22: 1.753 m (5' 9\").    Weight as of this encounter: 92 kg (202 lb 12.8 oz).  Medication Reconciliation: complete    FOOD SECURITY SCREENING QUESTIONS  Hunger Vital Signs:  Within the past 12 months we worried whether our food would run out before we got money to buy more. Never  Within the past 12 months the food we bought just didn't last and we didn't have money to get more. Never  Joya Burnham LPN 10/10/2022 8:21 AM    Do you have a healthcare directive? No        "

## 2022-10-18 ENCOUNTER — OFFICE VISIT (OUTPATIENT)
Dept: PSYCHIATRY | Facility: OTHER | Age: 47
End: 2022-10-18
Attending: PSYCHIATRY & NEUROLOGY
Payer: COMMERCIAL

## 2022-10-18 VITALS
HEART RATE: 101 BPM | TEMPERATURE: 97 F | OXYGEN SATURATION: 98 % | DIASTOLIC BLOOD PRESSURE: 87 MMHG | RESPIRATION RATE: 20 BRPM | WEIGHT: 200.1 LBS | SYSTOLIC BLOOD PRESSURE: 130 MMHG | BODY MASS INDEX: 29.55 KG/M2

## 2022-10-18 DIAGNOSIS — F32.A DEPRESSION, UNSPECIFIED DEPRESSION TYPE: Primary | ICD-10-CM

## 2022-10-18 PROCEDURE — 99215 OFFICE O/P EST HI 40 MIN: CPT | Performed by: PSYCHIATRY & NEUROLOGY

## 2022-10-18 RX ORDER — DESIPRAMINE HYDROCHLORIDE 25 MG/1
25 TABLET ORAL DAILY
Qty: 90 TABLET | Refills: 1 | Status: SHIPPED | OUTPATIENT
Start: 2022-10-18 | End: 2023-01-11 | Stop reason: DRUGHIGH

## 2022-10-18 RX ORDER — ARIPIPRAZOLE 10 MG/1
10 TABLET ORAL DAILY
Qty: 90 TABLET | Refills: 1 | Status: SHIPPED | OUTPATIENT
Start: 2022-10-18 | End: 2023-03-28

## 2022-10-18 ASSESSMENT — PATIENT HEALTH QUESTIONNAIRE - PHQ9
SUM OF ALL RESPONSES TO PHQ QUESTIONS 1-9: 0
10. IF YOU CHECKED OFF ANY PROBLEMS, HOW DIFFICULT HAVE THESE PROBLEMS MADE IT FOR YOU TO DO YOUR WORK, TAKE CARE OF THINGS AT HOME, OR GET ALONG WITH OTHER PEOPLE: NOT DIFFICULT AT ALL
SUM OF ALL RESPONSES TO PHQ QUESTIONS 1-9: 0

## 2022-10-18 ASSESSMENT — PAIN SCALES - GENERAL: PAINLEVEL: NO PAIN (0)

## 2022-10-18 NOTE — NURSING NOTE
"Chief Complaint   Patient presents with     RECHECK     Follow-up adjustment disorder       Initial /87 (BP Location: Right arm, Patient Position: Sitting, Cuff Size: Adult Regular)   Pulse 101   Temp 97  F (36.1  C) (Tympanic)   Resp 20   Wt 90.8 kg (200 lb 1.6 oz)   SpO2 98%   BMI 29.55 kg/m   Estimated body mass index is 29.55 kg/m  as calculated from the following:    Height as of 7/18/22: 1.753 m (5' 9\").    Weight as of this encounter: 90.8 kg (200 lb 1.6 oz).  Medication Reconciliation: complete       FOOD SECURITY SCREENING QUESTIONS:    The next two questions are to help us understand your food security.  If you are feeling you need any assistance in this area, we have resources available to support you today.    Hunger Vital Signs:  Within the past 12 months we worried whether our food would run out before we got money to buy more. Never  Within the past 12 months the food we bought just didn't last and we didn't have money to get more. Never  Elidia Baldwin,LPN on 10/18/2022 at 9:12 AM\    Elidia Baldwin    Advance Care Directive reviewed    "
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
## 2022-12-08 ENCOUNTER — MYC MEDICAL ADVICE (OUTPATIENT)
Dept: PSYCHIATRY | Facility: OTHER | Age: 47
End: 2022-12-08

## 2022-12-08 DIAGNOSIS — T88.7XXA MEDICATION SIDE EFFECTS: Primary | ICD-10-CM

## 2022-12-08 NOTE — TELEPHONE ENCOUNTER
I would suggest increasing desipramine to 50 mg a day. This can be taken as 25 mg twice a day or 50 mg (25mg X 2) once a day.    After at least 1 week on 50 mg a day, please get fasting blood work done (previously ordered).  I have just added a blood level for desipramine. Please be sure to let the clinician know that I have ordered blood work on 2 different times.    Do not take any desipramine in the morning before the blood test. After the test,  take your total daily dosage as you have been taking it.  Depending on your response and blood level, I can send a new prescription.

## 2022-12-21 ENCOUNTER — LAB (OUTPATIENT)
Dept: LAB | Facility: OTHER | Age: 47
End: 2022-12-21
Attending: PSYCHIATRY & NEUROLOGY
Payer: COMMERCIAL

## 2022-12-21 DIAGNOSIS — Z13.220 SCREENING FOR HYPERLIPIDEMIA: ICD-10-CM

## 2022-12-21 DIAGNOSIS — E55.9 VITAMIN D DEFICIENCY: ICD-10-CM

## 2022-12-21 DIAGNOSIS — R73.9 HYPERGLYCEMIA: ICD-10-CM

## 2022-12-21 DIAGNOSIS — T88.7XXA MEDICATION SIDE EFFECTS: ICD-10-CM

## 2022-12-21 LAB
ALBUMIN SERPL BCG-MCNC: 4.7 G/DL (ref 3.5–5.2)
ALP SERPL-CCNC: 53 U/L (ref 40–129)
ALT SERPL W P-5'-P-CCNC: 29 U/L (ref 10–50)
ANION GAP SERPL CALCULATED.3IONS-SCNC: 9 MMOL/L (ref 7–15)
AST SERPL W P-5'-P-CCNC: 22 U/L (ref 10–50)
BASOPHILS # BLD AUTO: 0 10E3/UL (ref 0–0.2)
BASOPHILS NFR BLD AUTO: 1 %
BILIRUB SERPL-MCNC: 0.6 MG/DL
BUN SERPL-MCNC: 18.6 MG/DL (ref 6–20)
CALCIUM SERPL-MCNC: 9.4 MG/DL (ref 8.6–10)
CHLORIDE SERPL-SCNC: 100 MMOL/L (ref 98–107)
CHOLEST SERPL-MCNC: 251 MG/DL
CREAT SERPL-MCNC: 1.16 MG/DL (ref 0.67–1.17)
DEPRECATED HCO3 PLAS-SCNC: 28 MMOL/L (ref 22–29)
EOSINOPHIL # BLD AUTO: 0.1 10E3/UL (ref 0–0.7)
EOSINOPHIL NFR BLD AUTO: 1 %
ERYTHROCYTE [DISTWIDTH] IN BLOOD BY AUTOMATED COUNT: 12.4 % (ref 10–15)
GFR SERPL CREATININE-BSD FRML MDRD: 79 ML/MIN/1.73M2
GLUCOSE SERPL-MCNC: 100 MG/DL (ref 70–99)
HBA1C MFR BLD: 5.4 % (ref 4–6.2)
HCT VFR BLD AUTO: 47.4 % (ref 40–53)
HDLC SERPL-MCNC: 49 MG/DL
HGB BLD-MCNC: 16.3 G/DL (ref 13.3–17.7)
IMM GRANULOCYTES # BLD: 0 10E3/UL
IMM GRANULOCYTES NFR BLD: 1 %
LDLC SERPL CALC-MCNC: 165 MG/DL
LYMPHOCYTES # BLD AUTO: 1.2 10E3/UL (ref 0.8–5.3)
LYMPHOCYTES NFR BLD AUTO: 24 %
MCH RBC QN AUTO: 31 PG (ref 26.5–33)
MCHC RBC AUTO-ENTMCNC: 34.4 G/DL (ref 31.5–36.5)
MCV RBC AUTO: 90 FL (ref 78–100)
MONOCYTES # BLD AUTO: 0.3 10E3/UL (ref 0–1.3)
MONOCYTES NFR BLD AUTO: 6 %
NEUTROPHILS # BLD AUTO: 3.4 10E3/UL (ref 1.6–8.3)
NEUTROPHILS NFR BLD AUTO: 67 %
NONHDLC SERPL-MCNC: 202 MG/DL
NRBC # BLD AUTO: 0 10E3/UL
NRBC BLD AUTO-RTO: 0 /100
PLATELET # BLD AUTO: 165 10E3/UL (ref 150–450)
POTASSIUM SERPL-SCNC: 4 MMOL/L (ref 3.4–5.3)
PROT SERPL-MCNC: 7.1 G/DL (ref 6.4–8.3)
RBC # BLD AUTO: 5.26 10E6/UL (ref 4.4–5.9)
SODIUM SERPL-SCNC: 137 MMOL/L (ref 136–145)
TRIGL SERPL-MCNC: 183 MG/DL
WBC # BLD AUTO: 5 10E3/UL (ref 4–11)

## 2022-12-21 PROCEDURE — 82306 VITAMIN D 25 HYDROXY: CPT | Mod: ZL

## 2022-12-21 PROCEDURE — 85004 AUTOMATED DIFF WBC COUNT: CPT | Mod: ZL

## 2022-12-21 PROCEDURE — 80061 LIPID PANEL: CPT | Mod: ZL

## 2022-12-21 PROCEDURE — 80053 COMPREHEN METABOLIC PANEL: CPT | Mod: ZL

## 2022-12-21 PROCEDURE — 80335 ANTIDEPRESSANT TRICYCLIC 1/2: CPT | Mod: ZL

## 2022-12-21 PROCEDURE — 36415 COLL VENOUS BLD VENIPUNCTURE: CPT | Mod: ZL

## 2022-12-21 PROCEDURE — 83036 HEMOGLOBIN GLYCOSYLATED A1C: CPT | Mod: ZL

## 2022-12-22 LAB — DEPRECATED CALCIDIOL+CALCIFEROL SERPL-MC: 18 UG/L (ref 20–75)

## 2022-12-24 LAB
DESIPRAMINE SERPL-MCNC: 16 NG/ML
IMIPRAMINE SERPL-MCNC: <10 NG/ML
IMIPRAMINE+DESIPR SERPL-MCNC: 16 NG/ML

## 2023-01-11 ENCOUNTER — OFFICE VISIT (OUTPATIENT)
Dept: PSYCHIATRY | Facility: OTHER | Age: 48
End: 2023-01-11
Attending: PSYCHIATRY & NEUROLOGY
Payer: COMMERCIAL

## 2023-01-11 VITALS
BODY MASS INDEX: 29.33 KG/M2 | DIASTOLIC BLOOD PRESSURE: 88 MMHG | OXYGEN SATURATION: 99 % | TEMPERATURE: 98.1 F | WEIGHT: 198.6 LBS | RESPIRATION RATE: 18 BRPM | SYSTOLIC BLOOD PRESSURE: 129 MMHG

## 2023-01-11 DIAGNOSIS — T88.7XXA MEDICATION SIDE EFFECTS: Primary | ICD-10-CM

## 2023-01-11 DIAGNOSIS — Z13.220 SCREENING FOR HYPERLIPIDEMIA: ICD-10-CM

## 2023-01-11 DIAGNOSIS — E22.1 SECONDARY HYPERPROLACTINEMIA (H): ICD-10-CM

## 2023-01-11 DIAGNOSIS — E55.9 VITAMIN D DEFICIENCY: ICD-10-CM

## 2023-01-11 DIAGNOSIS — R73.9 HYPERGLYCEMIA: ICD-10-CM

## 2023-01-11 DIAGNOSIS — F32.A DEPRESSION, UNSPECIFIED DEPRESSION TYPE: ICD-10-CM

## 2023-01-11 PROCEDURE — 99215 OFFICE O/P EST HI 40 MIN: CPT | Performed by: PSYCHIATRY & NEUROLOGY

## 2023-01-11 RX ORDER — ERGOCALCIFEROL 1.25 MG/1
50000 CAPSULE, LIQUID FILLED ORAL WEEKLY
Qty: 12 CAPSULE | Refills: 1 | Status: SHIPPED | OUTPATIENT
Start: 2023-01-11 | End: 2023-01-11 | Stop reason: DRUGHIGH

## 2023-01-11 RX ORDER — DESIPRAMINE HYDROCHLORIDE 50 MG/1
100 TABLET ORAL DAILY
Qty: 180 TABLET | Refills: 1 | Status: SHIPPED | OUTPATIENT
Start: 2023-01-11 | End: 2023-06-27

## 2023-01-11 ASSESSMENT — ANXIETY QUESTIONNAIRES
5. BEING SO RESTLESS THAT IT IS HARD TO SIT STILL: NOT AT ALL
3. WORRYING TOO MUCH ABOUT DIFFERENT THINGS: NOT AT ALL
4. TROUBLE RELAXING: NOT AT ALL
1. FEELING NERVOUS, ANXIOUS, OR ON EDGE: NOT AT ALL
GAD7 TOTAL SCORE: 0
8. IF YOU CHECKED OFF ANY PROBLEMS, HOW DIFFICULT HAVE THESE MADE IT FOR YOU TO DO YOUR WORK, TAKE CARE OF THINGS AT HOME, OR GET ALONG WITH OTHER PEOPLE?: NOT DIFFICULT AT ALL
GAD7 TOTAL SCORE: 0
IF YOU CHECKED OFF ANY PROBLEMS ON THIS QUESTIONNAIRE, HOW DIFFICULT HAVE THESE PROBLEMS MADE IT FOR YOU TO DO YOUR WORK, TAKE CARE OF THINGS AT HOME, OR GET ALONG WITH OTHER PEOPLE: NOT DIFFICULT AT ALL
7. FEELING AFRAID AS IF SOMETHING AWFUL MIGHT HAPPEN: NOT AT ALL
7. FEELING AFRAID AS IF SOMETHING AWFUL MIGHT HAPPEN: NOT AT ALL
6. BECOMING EASILY ANNOYED OR IRRITABLE: NOT AT ALL
2. NOT BEING ABLE TO STOP OR CONTROL WORRYING: NOT AT ALL
GAD7 TOTAL SCORE: 0

## 2023-01-11 ASSESSMENT — PATIENT HEALTH QUESTIONNAIRE - PHQ9
SUM OF ALL RESPONSES TO PHQ QUESTIONS 1-9: 2
10. IF YOU CHECKED OFF ANY PROBLEMS, HOW DIFFICULT HAVE THESE PROBLEMS MADE IT FOR YOU TO DO YOUR WORK, TAKE CARE OF THINGS AT HOME, OR GET ALONG WITH OTHER PEOPLE: SOMEWHAT DIFFICULT
SUM OF ALL RESPONSES TO PHQ QUESTIONS 1-9: 2

## 2023-01-11 ASSESSMENT — PAIN SCALES - GENERAL: PAINLEVEL: NO PAIN (0)

## 2023-01-11 NOTE — PROGRESS NOTES
"Psychiatric Progress Note/Visit  January 11, 2023    Identifying Data:  This is a 47-year-old man seen for follow-up psychiatric medication management visit for treatment of depression, anxiety, seasonal affective disorder and vitamin D deficiency    Interval History:  He was seen most recently on October 18, 2022.  At that time he had just recently restarted his Abilify and desipramine and was starting to feel better on his medications.  He was not having any side effects.  He was using vitamin D daily, but has not yet started full-spectrum light treatment for seasonal affective disorder.  We did not make any medication changes, but I encouraged him to start using his full-spectrum light daily.  On December 8, 2022 he contacted us saying that he was \"not feeling very good\".  I recommended that he increase his desipramine from 25 mg a day to 50 mg a day.  Today he reports that he is feeling better on the 50 mg dosage of desipramine and is not having any side effects.  He went on to say that he \"feels okay\" but also complained of \"no motivation\" and no enjoyment.  He has been inconsistent in using his full-spectrum light and he stopped taking his vitamin D, mainly because he had trouble remembering to do these.    Mental Status Exam:  Anxiety remains minimal, depression has increased slightly, currently mild.  Remainder of MSE is essentially unchanged from October 18, 2022.    Current Psychiatric Medications:  Abilify 10 mg a day  Desipramine 50 mg a day  Vitamin D3 2000 international units a day    Lab Tests/Results:  Fasting blood work was done on December 21, 2022 with the following results: Hemoglobin A1c 5.4, CBC with differential and platelets within normal limits, CMP with no significant laboratory abnormalities (glucose minimally elevated at 100), vitamin D level very low at 18, lipid panel was done on 12/21 and I am including results from 11 months ago: Cholesterol elevated at 251 (235), triglycerides " elevated at 183 (237), LDL increased at 165 (148), non-HDL cholesterol increased at 202 (195).  Serum imipramine plus desipramine level very low at 16 (therapeutic range 150-300)  His weight today was 198 pounds and 6 ounces, weight on October 18, 2022 was 200 pounds 1 ounce, weight on September 20, 2022 was 198 pounds and 2 ounces, weight on January 31, 2022 was 200 pounds and 4 ounces, weight on May 27, 2021 was 207 pounds.  I have reordered fasting blood work, including vitamin D level and desipramine levels to be done in approximately 2 months, prior to our next visit    Diagnosis:  Major depression, single episode, mild  Anxiety, unspecified  Seasonal affective disorder  Vitamin D deficiency    Impression/Assessment:  Jemal is tolerating the increased dosage of desipramine with some clinical improvement.  However he continues to have symptoms of depression that could benefit from further intervention.  I have encouraged him to work on using his full-spectrum light at least 30-60 minutes a day.  I again reiterated the benefit of aerobic exercise 45 minutes a day 4 days a week or more, but he may not be able to do this with his current schedule.  We also discussed increasing desipramine and increasing vitamin D.    Plan:  1.   Increase desipramine to 100 mg a day for depression and anxiety  2.   Increase vitamin D to vitamin D2 50,000 international units once a week for vitamin D deficiency (and depression)  3.   Continue Abilify 10 mg a day for depression  4.   Start using full-spectrum light for 30-60 minutes a day for seasonal affective disorder  5.   Repeat blood work, as noted above, prior to next visit  6.   Follow-up with Dr. Padilla in 2 months, or sooner if needed    Time spent on day of visit 63 minutes- 13 minutes (8:35 AM through 8:48 AM) review of EMR prior to visit, 36 minutes (10:18 AM through 10:54 AM) face-to-face meeting with patient, including discussion of risk/benefits, alternatives and  possible side effects to medication, counseling on above issues, and prescription of medications in the EMR, 14 minutes (10:54 AM through 11:08 AM) documentation in the EMR      Vinicius Padilla MD    Answers for HPI/ROS submitted by the patient on 1/11/2023  If you checked off any problems, how difficult have these problems made it for you to do your work, take care of things at home, or get along with other people?: Somewhat difficult  PHQ9 TOTAL SCORE: 2  LEANA 7 TOTAL SCORE: 0

## 2023-01-11 NOTE — NURSING NOTE
"Chief Complaint   Patient presents with     Recheck Medication     Follow up           Initial Temp 98.1  F (36.7  C) (Tympanic)   Resp 18   Wt 90.1 kg (198 lb 9.6 oz)   SpO2 99%   BMI 29.33 kg/m   Estimated body mass index is 29.33 kg/m  as calculated from the following:    Height as of 7/18/22: 1.753 m (5' 9\").    Weight as of this encounter: 90.1 kg (198 lb 9.6 oz).     FOOD SECURITY SCREENING QUESTIONS:    The next two questions are to help us understand your food security.  If you are feeling you need any assistance in this area, we have resources available to support you today.    Hunger Vital Signs:  Within the past 12 months we worried whether our food would run out before we got money to buy more. Never  Within the past 12 months the food we bought just didn't last and we didn't have money to get more. Never      Namita A [place]   "

## 2023-03-28 ENCOUNTER — OFFICE VISIT (OUTPATIENT)
Dept: PSYCHIATRY | Facility: OTHER | Age: 48
End: 2023-03-28
Attending: PSYCHIATRY & NEUROLOGY
Payer: COMMERCIAL

## 2023-03-28 VITALS
TEMPERATURE: 97.8 F | SYSTOLIC BLOOD PRESSURE: 140 MMHG | HEIGHT: 69 IN | BODY MASS INDEX: 29.71 KG/M2 | DIASTOLIC BLOOD PRESSURE: 85 MMHG | RESPIRATION RATE: 18 BRPM | OXYGEN SATURATION: 100 % | WEIGHT: 200.6 LBS

## 2023-03-28 DIAGNOSIS — E55.9 VITAMIN D DEFICIENCY: Primary | ICD-10-CM

## 2023-03-28 DIAGNOSIS — F32.A DEPRESSION, UNSPECIFIED DEPRESSION TYPE: ICD-10-CM

## 2023-03-28 PROCEDURE — 99215 OFFICE O/P EST HI 40 MIN: CPT | Performed by: PSYCHIATRY & NEUROLOGY

## 2023-03-28 RX ORDER — ERGOCALCIFEROL 1.25 MG/1
50000 CAPSULE, LIQUID FILLED ORAL WEEKLY
Qty: 13 CAPSULE | Refills: 1 | Status: SHIPPED | OUTPATIENT
Start: 2023-03-28

## 2023-03-28 RX ORDER — ARIPIPRAZOLE 10 MG/1
10 TABLET ORAL DAILY
Qty: 90 TABLET | Refills: 1 | Status: SHIPPED | OUTPATIENT
Start: 2023-03-28 | End: 2023-09-13 | Stop reason: DRUGHIGH

## 2023-03-28 ASSESSMENT — ANXIETY QUESTIONNAIRES
IF YOU CHECKED OFF ANY PROBLEMS ON THIS QUESTIONNAIRE, HOW DIFFICULT HAVE THESE PROBLEMS MADE IT FOR YOU TO DO YOUR WORK, TAKE CARE OF THINGS AT HOME, OR GET ALONG WITH OTHER PEOPLE: NOT DIFFICULT AT ALL
3. WORRYING TOO MUCH ABOUT DIFFERENT THINGS: NOT AT ALL
7. FEELING AFRAID AS IF SOMETHING AWFUL MIGHT HAPPEN: NOT AT ALL
2. NOT BEING ABLE TO STOP OR CONTROL WORRYING: NOT AT ALL
8. IF YOU CHECKED OFF ANY PROBLEMS, HOW DIFFICULT HAVE THESE MADE IT FOR YOU TO DO YOUR WORK, TAKE CARE OF THINGS AT HOME, OR GET ALONG WITH OTHER PEOPLE?: NOT DIFFICULT AT ALL
1. FEELING NERVOUS, ANXIOUS, OR ON EDGE: NOT AT ALL
6. BECOMING EASILY ANNOYED OR IRRITABLE: NOT AT ALL
GAD7 TOTAL SCORE: 0
7. FEELING AFRAID AS IF SOMETHING AWFUL MIGHT HAPPEN: NOT AT ALL
4. TROUBLE RELAXING: NOT AT ALL
5. BEING SO RESTLESS THAT IT IS HARD TO SIT STILL: NOT AT ALL
GAD7 TOTAL SCORE: 0
GAD7 TOTAL SCORE: 0

## 2023-03-28 ASSESSMENT — PATIENT HEALTH QUESTIONNAIRE - PHQ9
SUM OF ALL RESPONSES TO PHQ QUESTIONS 1-9: 1
10. IF YOU CHECKED OFF ANY PROBLEMS, HOW DIFFICULT HAVE THESE PROBLEMS MADE IT FOR YOU TO DO YOUR WORK, TAKE CARE OF THINGS AT HOME, OR GET ALONG WITH OTHER PEOPLE: NOT DIFFICULT AT ALL
SUM OF ALL RESPONSES TO PHQ QUESTIONS 1-9: 1

## 2023-03-28 ASSESSMENT — PAIN SCALES - GENERAL: PAINLEVEL: NO PAIN (0)

## 2023-03-28 NOTE — NURSING NOTE
"Chief Complaint   Patient presents with     Recheck Medication     Follow up           Initial BP (!) 163/95   Temp 97.8  F (36.6  C) (Tympanic)   Resp 18   Ht 1.753 m (5' 9\")   Wt 91 kg (200 lb 9.6 oz)   SpO2 100%   BMI 29.62 kg/m   Estimated body mass index is 29.62 kg/m  as calculated from the following:    Height as of this encounter: 1.753 m (5' 9\").    Weight as of this encounter: 91 kg (200 lb 9.6 oz).     FOOD SECURITY SCREENING QUESTIONS:    The next two questions are to help us understand your food security.  If you are feeling you need any assistance in this area, we have resources available to support you today.    Hunger Vital Signs:  Within the past 12 months we worried whether our food would run out before we got money to buy more. Never  Within the past 12 months the food we bought just didn't last and we didn't have money to get more. Never      Namita A [place]   "

## 2023-03-28 NOTE — PROGRESS NOTES
"Psychiatric Progress Note/Visit  March 28, 2023    Identifying Data:  This is a 48-year-old man seen for follow-up psychiatric medication management visit for treatment of depression, anxiety and seasonal affective disorder    Interval History:  He was seen most recently on January 11, 2023.  At that time he reported that he felt better on the recently increased dosage of desipramine to 50 mg a day.  He was having no adverse effects.  He noted that he felt \"okay\" but complained of \"no motivation\" and no enjoyment.  He was using his full-spectrum light therapy inconsistently and had stopped taking vitamin D as he had trouble remembering to take it.  We discussed increasing desipramine to 100 mg a day and changing to vitamin D2 50,000 units a week.  Today he reports that his mood is \"good\", but went on to say that he does not \"have that extra drive I used to have\".  He is tolerating the increased dosage of desipramine with no adverse effects and does report that he is able to take care of of all his obligations every day.  He apparently did not get the new vitamin D prescription, and I will reorder it and refer him to Dr. Mccarty for follow-up for vitamin D deficiency.    Mental Status Exam:  Anxiety remains minimal, depression remains mild.  Remainder of MSE is essentially unchanged from January 11, 2023.    Current Psychiatric Medications:  Desipramine 100 mg a day  Abilify 10 mg a day    Lab Tests/Results:  His most recent antidepressant level was below therapeutic range at 16 (150-300).  Laboratory studies were ordered on general 1/11//2023, but not yet done.  These include vitamin D level and lipid panel.  His weight today was 200 pounds and 6 ounces, weight on January 11, 2023 was 198 pounds and 6 ounces, weight on October 18, 2022 was 200 pounds, weight on January 31, 2022 was 200 pounds and 4 ounces, weight on May 27, 2021 was 207 pounds.    Diagnosis:  Major depression, single episode, mild  Anxiety, " unspecified  Seasonal affective disorder  Vitamin D deficiency    Impression/Assessment:  Meliton has responded well to the increased dosage of desipramine.  He still has a low level depression with little motivation.  We discussed the option to increase either of his medications or to consider a trial of modafinil (he wakes up at 6 AM every workday).  He also has not used his full-spectrum light very consistently this winter.  At this time he would prefer to see how he does and to try to exercise more.  He does not want to make any medication changes at this time.    Plan:  1.   Add vitamin D2 50,000 international units once a week for vitamin D deficiency  2.   Continue desipramine 100 mg a day for depression and anxiety  3.   Continue Abilify 10 mg a day for depression  4.   Follow-up with Dr. Padilla in 2-3 months, or sooner if needed    Time spent on day of visit 46 minutes- 12 minutes (8:10 AM through 8:22 AM) review of EMR prior to visit, 24 minutes (10:07 AM through 10:31 AM) face-to-face meeting with patient, including discussion of risk/benefits, alternatives and possible side effects to medication, counseling on above issues, and prescription of medications in the EMR, 10 minutes (10:31 AM through 10:41 AM) documentation in the EMR      Vinicius Padilla MD    Answers for HPI/ROS submitted by the patient on 3/28/2023  If you checked off any problems, how difficult have these problems made it for you to do your work, take care of things at home, or get along with other people?: Not difficult at all  PHQ9 TOTAL SCORE: 1  LEANA 7 TOTAL SCORE: 0

## 2023-05-06 ENCOUNTER — HEALTH MAINTENANCE LETTER (OUTPATIENT)
Age: 48
End: 2023-05-06

## 2023-06-12 ENCOUNTER — LAB (OUTPATIENT)
Dept: LAB | Facility: OTHER | Age: 48
End: 2023-06-12
Attending: PSYCHIATRY & NEUROLOGY
Payer: COMMERCIAL

## 2023-06-12 DIAGNOSIS — T88.7XXA MEDICATION SIDE EFFECTS: ICD-10-CM

## 2023-06-12 DIAGNOSIS — E55.9 VITAMIN D DEFICIENCY: ICD-10-CM

## 2023-06-12 DIAGNOSIS — E22.1 SECONDARY HYPERPROLACTINEMIA (H): ICD-10-CM

## 2023-06-12 DIAGNOSIS — R73.9 HYPERGLYCEMIA: ICD-10-CM

## 2023-06-12 DIAGNOSIS — Z13.220 SCREENING FOR HYPERLIPIDEMIA: ICD-10-CM

## 2023-06-12 LAB
ALBUMIN SERPL BCG-MCNC: 4.7 G/DL (ref 3.5–5.2)
ALP SERPL-CCNC: 65 U/L (ref 40–129)
ALT SERPL W P-5'-P-CCNC: 35 U/L (ref 10–50)
ANION GAP SERPL CALCULATED.3IONS-SCNC: 9 MMOL/L (ref 7–15)
AST SERPL W P-5'-P-CCNC: 23 U/L (ref 10–50)
BASOPHILS # BLD AUTO: 0 10E3/UL (ref 0–0.2)
BASOPHILS NFR BLD AUTO: 0 %
BILIRUB SERPL-MCNC: 0.5 MG/DL
BUN SERPL-MCNC: 17.7 MG/DL (ref 6–20)
CALCIUM SERPL-MCNC: 9.1 MG/DL (ref 8.6–10)
CHLORIDE SERPL-SCNC: 103 MMOL/L (ref 98–107)
CHOLEST SERPL-MCNC: 256 MG/DL
CREAT SERPL-MCNC: 1.12 MG/DL (ref 0.67–1.17)
DEPRECATED HCO3 PLAS-SCNC: 28 MMOL/L (ref 22–29)
EOSINOPHIL # BLD AUTO: 0.1 10E3/UL (ref 0–0.7)
EOSINOPHIL NFR BLD AUTO: 1 %
ERYTHROCYTE [DISTWIDTH] IN BLOOD BY AUTOMATED COUNT: 12.7 % (ref 10–15)
GFR SERPL CREATININE-BSD FRML MDRD: 82 ML/MIN/1.73M2
GLUCOSE SERPL-MCNC: 115 MG/DL (ref 70–99)
HBA1C MFR BLD: 5.2 % (ref 4–6.2)
HCT VFR BLD AUTO: 47.9 % (ref 40–53)
HDLC SERPL-MCNC: 52 MG/DL
HGB BLD-MCNC: 16.4 G/DL (ref 13.3–17.7)
IMM GRANULOCYTES # BLD: 0 10E3/UL
IMM GRANULOCYTES NFR BLD: 1 %
LDLC SERPL CALC-MCNC: 152 MG/DL
LYMPHOCYTES # BLD AUTO: 1.2 10E3/UL (ref 0.8–5.3)
LYMPHOCYTES NFR BLD AUTO: 21 %
MCH RBC QN AUTO: 30.8 PG (ref 26.5–33)
MCHC RBC AUTO-ENTMCNC: 34.2 G/DL (ref 31.5–36.5)
MCV RBC AUTO: 90 FL (ref 78–100)
MONOCYTES # BLD AUTO: 0.4 10E3/UL (ref 0–1.3)
MONOCYTES NFR BLD AUTO: 6 %
NEUTROPHILS # BLD AUTO: 4 10E3/UL (ref 1.6–8.3)
NEUTROPHILS NFR BLD AUTO: 71 %
NONHDLC SERPL-MCNC: 204 MG/DL
NRBC # BLD AUTO: 0 10E3/UL
NRBC BLD AUTO-RTO: 0 /100
PLATELET # BLD AUTO: 183 10E3/UL (ref 150–450)
POTASSIUM SERPL-SCNC: 4.1 MMOL/L (ref 3.4–5.3)
PROLACTIN SERPL 3RD IS-MCNC: 1 NG/ML (ref 4–15)
PROT SERPL-MCNC: 7.1 G/DL (ref 6.4–8.3)
RBC # BLD AUTO: 5.32 10E6/UL (ref 4.4–5.9)
SODIUM SERPL-SCNC: 140 MMOL/L (ref 136–145)
TRIGL SERPL-MCNC: 261 MG/DL
WBC # BLD AUTO: 5.6 10E3/UL (ref 4–11)

## 2023-06-12 PROCEDURE — 85004 AUTOMATED DIFF WBC COUNT: CPT | Mod: ZL

## 2023-06-12 PROCEDURE — 82306 VITAMIN D 25 HYDROXY: CPT | Mod: ZL

## 2023-06-12 PROCEDURE — 80053 COMPREHEN METABOLIC PANEL: CPT | Mod: ZL

## 2023-06-12 PROCEDURE — 36415 COLL VENOUS BLD VENIPUNCTURE: CPT | Mod: ZL

## 2023-06-12 PROCEDURE — 83036 HEMOGLOBIN GLYCOSYLATED A1C: CPT | Mod: ZL

## 2023-06-12 PROCEDURE — 80061 LIPID PANEL: CPT | Mod: ZL

## 2023-06-12 PROCEDURE — 84146 ASSAY OF PROLACTIN: CPT | Mod: ZL

## 2023-06-13 LAB — DEPRECATED CALCIDIOL+CALCIFEROL SERPL-MC: 27 UG/L (ref 20–75)

## 2023-06-27 ENCOUNTER — OFFICE VISIT (OUTPATIENT)
Dept: PSYCHIATRY | Facility: OTHER | Age: 48
End: 2023-06-27
Attending: PSYCHIATRY & NEUROLOGY
Payer: COMMERCIAL

## 2023-06-27 VITALS
WEIGHT: 193.5 LBS | BODY MASS INDEX: 28.66 KG/M2 | HEIGHT: 69 IN | HEART RATE: 107 BPM | SYSTOLIC BLOOD PRESSURE: 131 MMHG | DIASTOLIC BLOOD PRESSURE: 93 MMHG | TEMPERATURE: 98 F | OXYGEN SATURATION: 97 % | RESPIRATION RATE: 18 BRPM

## 2023-06-27 DIAGNOSIS — F32.A DEPRESSION, UNSPECIFIED DEPRESSION TYPE: Primary | ICD-10-CM

## 2023-06-27 PROCEDURE — 99215 OFFICE O/P EST HI 40 MIN: CPT | Performed by: PSYCHIATRY & NEUROLOGY

## 2023-06-27 RX ORDER — DESIPRAMINE HYDROCHLORIDE 100 MG/1
100 TABLET ORAL AT BEDTIME
Qty: 90 TABLET | Refills: 1 | Status: SHIPPED | OUTPATIENT
Start: 2023-06-27 | End: 2023-12-01

## 2023-06-27 ASSESSMENT — ANXIETY QUESTIONNAIRES
IF YOU CHECKED OFF ANY PROBLEMS ON THIS QUESTIONNAIRE, HOW DIFFICULT HAVE THESE PROBLEMS MADE IT FOR YOU TO DO YOUR WORK, TAKE CARE OF THINGS AT HOME, OR GET ALONG WITH OTHER PEOPLE: NOT DIFFICULT AT ALL
8. IF YOU CHECKED OFF ANY PROBLEMS, HOW DIFFICULT HAVE THESE MADE IT FOR YOU TO DO YOUR WORK, TAKE CARE OF THINGS AT HOME, OR GET ALONG WITH OTHER PEOPLE?: NOT DIFFICULT AT ALL
GAD7 TOTAL SCORE: 0
5. BEING SO RESTLESS THAT IT IS HARD TO SIT STILL: NOT AT ALL
1. FEELING NERVOUS, ANXIOUS, OR ON EDGE: NOT AT ALL
GAD7 TOTAL SCORE: 0
4. TROUBLE RELAXING: NOT AT ALL
2. NOT BEING ABLE TO STOP OR CONTROL WORRYING: NOT AT ALL
6. BECOMING EASILY ANNOYED OR IRRITABLE: NOT AT ALL
7. FEELING AFRAID AS IF SOMETHING AWFUL MIGHT HAPPEN: NOT AT ALL
7. FEELING AFRAID AS IF SOMETHING AWFUL MIGHT HAPPEN: NOT AT ALL
GAD7 TOTAL SCORE: 0
3. WORRYING TOO MUCH ABOUT DIFFERENT THINGS: NOT AT ALL

## 2023-06-27 ASSESSMENT — PAIN SCALES - GENERAL: PAINLEVEL: NO PAIN (0)

## 2023-06-27 NOTE — PROGRESS NOTES
"Psychiatric Progress Note/Visit  June 27, 2023    Identifying Data:  This is a 48-year-old man seen for follow-up psychiatric medication management visit for treatment of depression and anxiety.    Interval History:  He was seen most recently on March 28, 2023.  At that time he described his mood as \"good\" and was noticing an improvement on the increased dosage (100 mg) of desipramine.  He still reported that he was not having \"that extra drive I used to have\".  He also noted that he had not gotten his previous vitamin D prescription filled and this was reordered and then referred to Dr. Mccarty.  Today he reports that he continues to do well, but still feels that he would like to have more energy.  We discussed his pattern of going to sleep fairly early in the evening (usually before 9:00) and sleeping till about 630.  He notes that he feels a little bit tired when he first gets up, but is not feeling tired throughout his morning.  He goes to sleep so early because his girlfriend goes to bed that early and gets up very early.  We also talked about trying to increase his aerobic exercise, as he has done in the past, to increase his energy.    Mental Status Exam:  Anxiety remains minimal, depression has decreased slightly, currently minimal to mild.  Remainder of MSE is essentially unchanged from March 28, 2023.    Current Psychiatric Medications:  Desipramine 100 mg a day  Abilify 10 mg a day    Lab Tests/Results:  He had blood work done on June 12, 2023 (at 2:25 PM, not fasting) with the following results: Vitamin D level in normal range at 27, hemoglobin A1c 5.2, serum prolactin low at less than 1, CBC with differential and platelets within normal limits, CMP showed glucose slightly elevated at 115, lipid panel showed cholesterol increased at 256 (251 6 months ago, 235 1 year ago), triglycerides increased at 261 (183, 237), LDL increased at 152 (165, 148), non-HDL cholesterol increased at 204 (202, 195)  His weight " today was 193 pounds and 5 ounces, weight on March 28, 2023 was 200 pounds and 6 ounces, weight on January 11, 2023 was 198 pounds and 6 ounces, weight on October 18, 2022 was 200 pounds, weight on January 31, 2022 was 200 pounds and 4 ounces, weight on May 27, 2021 was 207 pounds.    Diagnosis:  Major depression, single episode, mild  Anxiety, unspecified  Seasonal affective disorder  History of vitamin D deficiency    Impression/Assessment:  Jemal continues to do well on a combination of desipramine and Abilify.  He has lost some weight, even though his lipid panel shows some mild elevations.  I have encouraged him to get a repeat EKG, since his desipramine dosage has been increased.    Plan:  1.   Continue desipramine 100 mg a day for depression and anxiety  2.   Continue Abilify 10 mg a day for depression  3.   Repeat EKG  4.   Follow-up with Dr. Padilla in 3 months, or sooner if needed    Time spent on day of visit 42 minutes- 15 minutes (8:07 AM through 8:22 AM) review of EMR prior to visit, 16 minutes (10:56 AM through 11:12 AM) face-to-face meeting with patient, including discussion of risk/benefits, alternatives and possible side effects to medication, counseling on above issues, and prescription of medications in the EMR, 11 minutes (11:12 AM through 11:23 AM) documentation in the EMR      Vinicius Padilla MD    Answers for HPI/ROS submitted by the patient on 6/27/2023  If you checked off any problems, how difficult have these problems made it for you to do your work, take care of things at home, or get along with other people?: Not difficult at all  PHQ9 TOTAL SCORE: 0  LEANA 7 TOTAL SCORE: 0

## 2023-07-05 ENCOUNTER — TELEPHONE (OUTPATIENT)
Dept: PSYCHIATRY | Facility: OTHER | Age: 48
End: 2023-07-05
Payer: COMMERCIAL

## 2023-07-05 DIAGNOSIS — Z79.899 ENCOUNTER FOR MONITORING TRICYCLIC ANTIDEPRESSANT THERAPY: Primary | ICD-10-CM

## 2023-07-05 DIAGNOSIS — Z51.81 ENCOUNTER FOR MONITORING TRICYCLIC ANTIDEPRESSANT THERAPY: Primary | ICD-10-CM

## 2023-07-05 NOTE — TELEPHONE ENCOUNTER
Please put in a referral that you wanted the patient to have so Radiology can schedule.        Anabel Rome on 7/5/2023 at 10:09 AM

## 2023-09-12 ENCOUNTER — ALLIED HEALTH/NURSE VISIT (OUTPATIENT)
Dept: FAMILY MEDICINE | Facility: OTHER | Age: 48
End: 2023-09-12
Attending: FAMILY MEDICINE
Payer: COMMERCIAL

## 2023-09-12 DIAGNOSIS — Z79.899 ENCOUNTER FOR MONITORING TRICYCLIC ANTIDEPRESSANT THERAPY: Primary | ICD-10-CM

## 2023-09-12 DIAGNOSIS — Z51.81 ENCOUNTER FOR MONITORING TRICYCLIC ANTIDEPRESSANT THERAPY: Primary | ICD-10-CM

## 2023-09-12 LAB
ATRIAL RATE - MUSE: 88 BPM
DIASTOLIC BLOOD PRESSURE - MUSE: NORMAL MMHG
INTERPRETATION ECG - MUSE: NORMAL
P AXIS - MUSE: 45 DEGREES
PR INTERVAL - MUSE: 142 MS
QRS DURATION - MUSE: 82 MS
QT - MUSE: 358 MS
QTC - MUSE: 433 MS
R AXIS - MUSE: -18 DEGREES
SYSTOLIC BLOOD PRESSURE - MUSE: NORMAL MMHG
T AXIS - MUSE: 58 DEGREES
VENTRICULAR RATE- MUSE: 88 BPM

## 2023-09-12 PROCEDURE — 93000 ELECTROCARDIOGRAM COMPLETE: CPT | Performed by: STUDENT IN AN ORGANIZED HEALTH CARE EDUCATION/TRAINING PROGRAM

## 2023-09-12 ASSESSMENT — PATIENT HEALTH QUESTIONNAIRE - PHQ9
SUM OF ALL RESPONSES TO PHQ QUESTIONS 1-9: 1
10. IF YOU CHECKED OFF ANY PROBLEMS, HOW DIFFICULT HAVE THESE PROBLEMS MADE IT FOR YOU TO DO YOUR WORK, TAKE CARE OF THINGS AT HOME, OR GET ALONG WITH OTHER PEOPLE: NOT DIFFICULT AT ALL
10. IF YOU CHECKED OFF ANY PROBLEMS, HOW DIFFICULT HAVE THESE PROBLEMS MADE IT FOR YOU TO DO YOUR WORK, TAKE CARE OF THINGS AT HOME, OR GET ALONG WITH OTHER PEOPLE: NOT DIFFICULT AT ALL
SUM OF ALL RESPONSES TO PHQ QUESTIONS 1-9: 1

## 2023-09-12 NOTE — PROGRESS NOTES
Pt presented to nurse injection room to get an EKG, orders in Epic. EKG ordered by Dr. Padilla for monitoring antidepressant therapy. EKG performed and transmitted, then imported to [facility].    LAURYN SHAH RN on 9/12/2023 at 8:34 AM

## 2023-09-13 ENCOUNTER — OFFICE VISIT (OUTPATIENT)
Dept: PSYCHIATRY | Facility: OTHER | Age: 48
End: 2023-09-13
Attending: PSYCHIATRY & NEUROLOGY
Payer: COMMERCIAL

## 2023-09-13 VITALS
RESPIRATION RATE: 18 BRPM | OXYGEN SATURATION: 96 % | HEIGHT: 70 IN | TEMPERATURE: 97.1 F | DIASTOLIC BLOOD PRESSURE: 87 MMHG | SYSTOLIC BLOOD PRESSURE: 127 MMHG | BODY MASS INDEX: 28.47 KG/M2 | WEIGHT: 198.9 LBS

## 2023-09-13 DIAGNOSIS — F32.A DEPRESSION, UNSPECIFIED DEPRESSION TYPE: Primary | ICD-10-CM

## 2023-09-13 PROCEDURE — 99417 PROLNG OP E/M EACH 15 MIN: CPT | Performed by: PSYCHIATRY & NEUROLOGY

## 2023-09-13 PROCEDURE — 99215 OFFICE O/P EST HI 40 MIN: CPT | Performed by: PSYCHIATRY & NEUROLOGY

## 2023-09-13 RX ORDER — ARIPIPRAZOLE 5 MG/1
7.5 TABLET ORAL DAILY
Qty: 135 TABLET | Refills: 1 | Status: SHIPPED | OUTPATIENT
Start: 2023-09-13 | End: 2024-05-20

## 2023-09-13 ASSESSMENT — ANXIETY QUESTIONNAIRES
6. BECOMING EASILY ANNOYED OR IRRITABLE: NOT AT ALL
GAD7 TOTAL SCORE: 0
7. FEELING AFRAID AS IF SOMETHING AWFUL MIGHT HAPPEN: NOT AT ALL
3. WORRYING TOO MUCH ABOUT DIFFERENT THINGS: NOT AT ALL
4. TROUBLE RELAXING: NOT AT ALL
2. NOT BEING ABLE TO STOP OR CONTROL WORRYING: NOT AT ALL
1. FEELING NERVOUS, ANXIOUS, OR ON EDGE: NOT AT ALL
IF YOU CHECKED OFF ANY PROBLEMS ON THIS QUESTIONNAIRE, HOW DIFFICULT HAVE THESE PROBLEMS MADE IT FOR YOU TO DO YOUR WORK, TAKE CARE OF THINGS AT HOME, OR GET ALONG WITH OTHER PEOPLE: NOT DIFFICULT AT ALL
GAD7 TOTAL SCORE: 0
5. BEING SO RESTLESS THAT IT IS HARD TO SIT STILL: NOT AT ALL

## 2023-09-13 ASSESSMENT — PAIN SCALES - GENERAL: PAINLEVEL: NO PAIN (0)

## 2023-09-13 NOTE — NURSING NOTE
"Chief Complaint   Patient presents with    Recheck Medication     Follow up           Initial /87 (BP Location: Right arm, Patient Position: Sitting, Cuff Size: Adult Regular)   Temp 97.1  F (36.2  C) (Tympanic)   Resp 18   Ht 1.778 m (5' 10\")   Wt 90.2 kg (198 lb 14.4 oz)   SpO2 96%   BMI 28.54 kg/m   Estimated body mass index is 28.54 kg/m  as calculated from the following:    Height as of this encounter: 1.778 m (5' 10\").    Weight as of this encounter: 90.2 kg (198 lb 14.4 oz).     FOOD SECURITY SCREENING QUESTIONS:    The next two questions are to help us understand your food security.  If you are feeling you need any assistance in this area, we have resources available to support you today.    Hunger Vital Signs:  Within the past 12 months we worried whether our food would run out before we got money to buy more. Never  Within the past 12 months the food we bought just didn't last and we didn't have money to get more. Never      Namita A [place]   "

## 2023-09-13 NOTE — PROGRESS NOTES
Psychiatric Progress Note/Visit  September 13, 2023    Identifying Data:  This is a 48-year-old man seen for follow-up psychiatric medication management visit for treatment of depression, anxiety and seasonal affective disorder.    Interval History:  He was seen most recently on June 27, 2023.  At that time he reported that he was continuing to do well, but wanted to have more energy.  He noted that when he got up in the morning he was tired for a while but felt better as the day went on.  He also noted that he tends to go to sleep early and get up early to help accommodate his girlfriend's work schedule.  I encouraged him to increase his aerobic exercise, which has been helpful for him in the past.    Today he reports that he continues to do reasonably well, even though his energy is not quite as good as he would like it to be.  He said he has been doing aerobic exercise about twice a week.  I encouraged him to try to get at least 4 days a week of 45 minutes of aerobic exercise.    He also brought up the possibility of beginning to come off of medication.  We had a long discussion about risks and benefits.  It is unclear whether this is his first episode of depression or whether he has had other episodes in the past.  He says that he felt that he has been depressed in the past, but got better without treatment.    Mental Status Exam:  Anxiety remains minimal, depression continues to decrease and is considered minimal.  Remainder of MSE is essentially unchanged from June 27, 2023.    Current Psychiatric Medications:  Desipramine 100 mg a day  Abilify 10 mg a day    Lab Tests/Results:  ECG was done on 9/12/2023 and was read as sinus rhythm and normal ECG.    Laboratory studies were done on June 12, 2023 and reviewed previously.  We discussed delaying future blood work, as he would like to try to get off of Abilify, and we would not need to do routine laboratory studies if he is not taking Abilify.    His weight today  was 198 pounds and 9 ounces, weight on June 27, 2023 was 193 pounds and 5 ounces, weight on March 28, 2023 was 200 pounds and 6 ounces, weight on January 11, 2023 was 198 pounds and 6 ounces, weight on October 18, 2022 was 200 pounds, weight on January 31, 2022 was 200 pounds and 4 ounces, weight on May 27, 2021 was 207 pounds.    Diagnosis:  Major depression, single episode, mild  Anxiety, unspecified  Seasonal affective disorder  History of vitamin D deficiency    Impression/Assessment:  Jemal continues to do well on his current psychiatric medications.  He feels that if he could maintain his present state of functioning without medications he would be satisfied with that.  As he is desirable of starting to come off medicines, we will start by weaning down his Abilify.    Plan:  Begin to wean off of Abilify for depression, decreasing dosage by 2.5-5 mg every 2-4 weeks, as tolerated  Continue desipramine 100 mg a day for depression and anxiety  Follow-up with Dr. Padilla in 2-3 months, or sooner if needed    Time spent on day of visit 74 minutes - 14 minutes (8:10 AM through 8:24 AM) review of EMR prior to visit, 45 minutes (9:19 AM through 10:04 AM) face-to-face meeting with patient, including discussion of risk/benefits, alternatives and possible side effects to medication, counseling on above issues, and prescription of medications in the EMR, 15 minutes (4 AM through 10:19 AM) documentation in the EMR      Vinicius Padilla MD  Answers submitted by the patient for this visit:  Patient Health Questionnaire (Submitted on 9/12/2023)  If you checked off any problems, how difficult have these problems made it for you to do your work, take care of things at home, or get along with other people?: Not difficult at all  PHQ9 TOTAL SCORE: 1  LEANA-7 (Submitted on 9/13/2023)  LEANA 7 TOTAL SCORE: 0

## 2023-11-09 ENCOUNTER — APPOINTMENT (OUTPATIENT)
Dept: FAMILY MEDICINE | Facility: OTHER | Age: 48
End: 2023-11-09
Attending: FAMILY MEDICINE

## 2023-11-09 PROCEDURE — 99499 UNLISTED E&M SERVICE: CPT | Performed by: FAMILY MEDICINE

## 2023-11-09 PROCEDURE — 999N001196 HC STATISTIC UA W/ REFLEX TO MICRO GICH: Mod: ZL | Performed by: FAMILY MEDICINE

## 2023-11-16 DIAGNOSIS — N52.9 ERECTILE DYSFUNCTION, UNSPECIFIED ERECTILE DYSFUNCTION TYPE: ICD-10-CM

## 2023-11-17 RX ORDER — SILDENAFIL 100 MG/1
TABLET, FILM COATED ORAL
Qty: 30 TABLET | Refills: 11 | Status: SHIPPED | OUTPATIENT
Start: 2023-11-17 | End: 2024-09-16

## 2023-12-01 ENCOUNTER — MYC REFILL (OUTPATIENT)
Dept: PSYCHIATRY | Facility: OTHER | Age: 48
End: 2023-12-01
Payer: COMMERCIAL

## 2023-12-01 DIAGNOSIS — F32.A DEPRESSION, UNSPECIFIED DEPRESSION TYPE: ICD-10-CM

## 2023-12-04 RX ORDER — DESIPRAMINE HYDROCHLORIDE 100 MG/1
100 TABLET ORAL AT BEDTIME
Qty: 90 TABLET | Refills: 1 | Status: SHIPPED | OUTPATIENT
Start: 2023-12-04 | End: 2024-02-19 | Stop reason: DRUGHIGH

## 2023-12-04 RX ORDER — DESIPRAMINE HYDROCHLORIDE 100 MG/1
100 TABLET ORAL AT BEDTIME
Qty: 90 TABLET | Refills: 1 | OUTPATIENT
Start: 2023-12-04

## 2024-02-19 ENCOUNTER — OFFICE VISIT (OUTPATIENT)
Dept: PSYCHIATRY | Facility: OTHER | Age: 49
End: 2024-02-19
Attending: PSYCHIATRY & NEUROLOGY
Payer: COMMERCIAL

## 2024-02-19 VITALS
DIASTOLIC BLOOD PRESSURE: 87 MMHG | TEMPERATURE: 97.6 F | RESPIRATION RATE: 18 BRPM | BODY MASS INDEX: 30.29 KG/M2 | WEIGHT: 204.5 LBS | HEIGHT: 69 IN | HEART RATE: 113 BPM | SYSTOLIC BLOOD PRESSURE: 141 MMHG | OXYGEN SATURATION: 97 %

## 2024-02-19 DIAGNOSIS — Z13.220 SCREENING FOR HYPERLIPIDEMIA: ICD-10-CM

## 2024-02-19 DIAGNOSIS — F32.A DEPRESSION, UNSPECIFIED DEPRESSION TYPE: Primary | ICD-10-CM

## 2024-02-19 PROCEDURE — 99215 OFFICE O/P EST HI 40 MIN: CPT | Performed by: PSYCHIATRY & NEUROLOGY

## 2024-02-19 RX ORDER — DESIPRAMINE HYDROCHLORIDE 25 MG/1
75 TABLET ORAL DAILY
Qty: 270 TABLET | Refills: 1 | Status: SHIPPED | OUTPATIENT
Start: 2024-02-19 | End: 2024-05-20

## 2024-02-19 ASSESSMENT — ANXIETY QUESTIONNAIRES
8. IF YOU CHECKED OFF ANY PROBLEMS, HOW DIFFICULT HAVE THESE MADE IT FOR YOU TO DO YOUR WORK, TAKE CARE OF THINGS AT HOME, OR GET ALONG WITH OTHER PEOPLE?: NOT DIFFICULT AT ALL
2. NOT BEING ABLE TO STOP OR CONTROL WORRYING: NOT AT ALL
GAD7 TOTAL SCORE: 0
5. BEING SO RESTLESS THAT IT IS HARD TO SIT STILL: NOT AT ALL
4. TROUBLE RELAXING: NOT AT ALL
GAD7 TOTAL SCORE: 0
1. FEELING NERVOUS, ANXIOUS, OR ON EDGE: NOT AT ALL
IF YOU CHECKED OFF ANY PROBLEMS ON THIS QUESTIONNAIRE, HOW DIFFICULT HAVE THESE PROBLEMS MADE IT FOR YOU TO DO YOUR WORK, TAKE CARE OF THINGS AT HOME, OR GET ALONG WITH OTHER PEOPLE: NOT DIFFICULT AT ALL
3. WORRYING TOO MUCH ABOUT DIFFERENT THINGS: NOT AT ALL
7. FEELING AFRAID AS IF SOMETHING AWFUL MIGHT HAPPEN: NOT AT ALL
GAD7 TOTAL SCORE: 0
6. BECOMING EASILY ANNOYED OR IRRITABLE: NOT AT ALL
7. FEELING AFRAID AS IF SOMETHING AWFUL MIGHT HAPPEN: NOT AT ALL

## 2024-02-19 ASSESSMENT — PAIN SCALES - GENERAL: PAINLEVEL: NO PAIN (0)

## 2024-02-19 NOTE — NURSING NOTE
"Chief Complaint   Patient presents with    Recheck Medication     Follow up       Initial BP (!) 141/87   Pulse 113   Temp 97.6  F (36.4  C) (Tympanic)   Resp 18   Ht 1.753 m (5' 9\")   Wt 92.8 kg (204 lb 8 oz)   SpO2 97%   BMI 30.20 kg/m   Estimated body mass index is 30.2 kg/m  as calculated from the following:    Height as of this encounter: 1.753 m (5' 9\").    Weight as of this encounter: 92.8 kg (204 lb 8 oz).  Medication Review: complete    The next two questions are to help us understand your food security.  If you are feeling you need any assistance in this area, we have resources available to support you today.           No data to display                  Health Care Directive:  Patient does not have a Health Care Directive or Living Will: Discussed advance care planning with patient; however, patient declined at this time.    Namita Jarvis      "

## 2024-02-19 NOTE — PROGRESS NOTES
Psychiatric Progress Note/Visit  February 19, 2024    Identifying Data:  This is a 48-year-old man seen for psychiatric medication management follow-up visit for treatment of depression, anxiety and seasonal affective disorder    Interval History:  Jemal was seen most recently on September 13, 2023.  At that time he continued doing reasonably well and brought up the possibility of beginning to come off some of his medication.  We discussed his history and medication options and decided to try to wean off of Abilify.    Today he reports that he weaned all the way off of the Abilify, but after a few weeks began to feel more anxious and went back on 5 mg a day.  He says that he quickly returned to his previous baseline.  He continues to do well on 5 mg of Abilify a day and 100 mg of desipramine.  We discussed the option to begin to taper off desipramine.  We also discussed his diagnosis of seasonal affective disorder and previous use of full-spectrum light therapy.  At this time he does not feel that he needs to use his light therapy, as the winter has been so mild.    Mental Status Exam:  Anxiety remains minimal, depression also remains minimal.  Remainder of MSE is essentially unchanged from September 13, 2023.    Current Psychiatric Medications:  Desipramine 100 mg a day  Abilify 5 mg a day    Lab Tests/Results:  Fasting blood work was done on June 12, 2023 and reviewed previously.  I have ordered a repeat fasting lipid panel.    Weight today was 204 pounds and 5 ounces, weight on September 13, 2023 was 198 pounds and 9 ounces, weight on June 27, 2023 was 193 pounds and 5 ounces, weight on March 28, 2023 was 200 pounds and 6 ounces, weight on January 11, 2023 was 198 pounds and 6 ounces    Diagnosis:  Major depression, single episode, mild  Anxiety, unspecified  Seasonal affective disorder  History of vitamin D deficiency    Impression/Assessment:  Jemal was unable to completely come off of Abilify and maintain his  good level of functioning.  However he has been able to decrease Abilify from 10 mg a day to 5 mg a day and is still doing well.  We discussed a trial of slowly coming off of desipramine, if possible.    Plan:  Decrease desipramine by 25 mg a day no more frequently than every 3-4 weeks, for depression  Continue Abilify 5 mg a day for depression and anxiety  Continue to monitor seasonal affective disorder to consider restarting full-spectrum light therapy.  Follow-up with Dr. Padilla in 3 months    Time spent on day of visit 45 minutes - 11 minutes (8:24 AM through 8:35 AM) review of EMR prior to visit, 22 minutes (1:32 PM through 1:54 PM) face-to-face meeting with patient, including discussion of risk/benefits, alternatives and possible side effects to medication, counseling on above issues, and prescription of medications in the EMR, 12 minutes (1:54 PM through 2:06 PM) documentation in the EMR      Vinicius Padilla MD    Answers submitted by the patient for this visit:  Patient Health Questionnaire (Submitted on 2/19/2024)  If you checked off any problems, how difficult have these problems made it for you to do your work, take care of things at home, or get along with other people?: Not difficult at all  PHQ9 TOTAL SCORE: 0  LEANA-7 (Submitted on 2/19/2024)  LEANA 7 TOTAL SCORE: 0

## 2024-05-20 ENCOUNTER — OFFICE VISIT (OUTPATIENT)
Dept: PSYCHIATRY | Facility: OTHER | Age: 49
End: 2024-05-20
Attending: PSYCHIATRY & NEUROLOGY
Payer: COMMERCIAL

## 2024-05-20 VITALS
BODY MASS INDEX: 30.27 KG/M2 | OXYGEN SATURATION: 99 % | WEIGHT: 205 LBS | DIASTOLIC BLOOD PRESSURE: 94 MMHG | SYSTOLIC BLOOD PRESSURE: 149 MMHG | RESPIRATION RATE: 16 BRPM | TEMPERATURE: 97.6 F | HEART RATE: 104 BPM

## 2024-05-20 DIAGNOSIS — E22.1 SECONDARY HYPERPROLACTINEMIA (H): ICD-10-CM

## 2024-05-20 DIAGNOSIS — F32.A DEPRESSION, UNSPECIFIED DEPRESSION TYPE: ICD-10-CM

## 2024-05-20 DIAGNOSIS — T88.7XXA MEDICATION SIDE EFFECTS: ICD-10-CM

## 2024-05-20 DIAGNOSIS — Z13.220 SCREENING FOR HYPERLIPIDEMIA: Primary | ICD-10-CM

## 2024-05-20 DIAGNOSIS — R73.9 HYPERGLYCEMIA: ICD-10-CM

## 2024-05-20 PROCEDURE — 99215 OFFICE O/P EST HI 40 MIN: CPT | Performed by: PSYCHIATRY & NEUROLOGY

## 2024-05-20 RX ORDER — DESIPRAMINE HYDROCHLORIDE 25 MG/1
75 TABLET ORAL DAILY
Qty: 90 TABLET | Refills: 1 | Status: SHIPPED | OUTPATIENT
Start: 2024-05-20

## 2024-05-20 RX ORDER — ARIPIPRAZOLE 5 MG/1
7.5 TABLET ORAL DAILY
Qty: 135 TABLET | Refills: 1 | Status: SHIPPED | OUTPATIENT
Start: 2024-05-20

## 2024-05-20 ASSESSMENT — ANXIETY QUESTIONNAIRES
7. FEELING AFRAID AS IF SOMETHING AWFUL MIGHT HAPPEN: NOT AT ALL
5. BEING SO RESTLESS THAT IT IS HARD TO SIT STILL: NOT AT ALL
1. FEELING NERVOUS, ANXIOUS, OR ON EDGE: NOT AT ALL
7. FEELING AFRAID AS IF SOMETHING AWFUL MIGHT HAPPEN: NOT AT ALL
3. WORRYING TOO MUCH ABOUT DIFFERENT THINGS: NOT AT ALL
GAD7 TOTAL SCORE: 0
4. TROUBLE RELAXING: NOT AT ALL
IF YOU CHECKED OFF ANY PROBLEMS ON THIS QUESTIONNAIRE, HOW DIFFICULT HAVE THESE PROBLEMS MADE IT FOR YOU TO DO YOUR WORK, TAKE CARE OF THINGS AT HOME, OR GET ALONG WITH OTHER PEOPLE: NOT DIFFICULT AT ALL
8. IF YOU CHECKED OFF ANY PROBLEMS, HOW DIFFICULT HAVE THESE MADE IT FOR YOU TO DO YOUR WORK, TAKE CARE OF THINGS AT HOME, OR GET ALONG WITH OTHER PEOPLE?: NOT DIFFICULT AT ALL
6. BECOMING EASILY ANNOYED OR IRRITABLE: NOT AT ALL
2. NOT BEING ABLE TO STOP OR CONTROL WORRYING: NOT AT ALL
GAD7 TOTAL SCORE: 0

## 2024-05-20 ASSESSMENT — PAIN SCALES - GENERAL: PAINLEVEL: NO PAIN (0)

## 2024-05-20 NOTE — PROGRESS NOTES
Psychiatric Progress Note/Visit  May 20, 2024    Identifying Data:  This is a 49-year-old man seen for follow-up psychiatric medication management visit for treatment of depression, anxiety and seasonal affective disorder.    Interval History:  He was seen most recently February 19, 2024.  At that time he reported that he had stopped his Abilify, but became more anxious and went back on 5 mg a day.  He quickly returned to his previous good baseline with 5 mg of Abilify and 100 mg of desipramine daily.  We discussed the option to decrease or possibly work on discontinuing the desipramine.    Today he reports that he continues to do well on 5 mg of Abilify and 75 mg of desipramine daily.  We reviewed his previous antidepressant blood level that was well below therapeutic range.  We discussed the possibility of further decreasing desipramine, but he is comfortable keeping everything where it is as he has no adverse effects and is functioning well.    Mental Status Exam:  Anxiety has decreased gradually, currently minimal to absent.  Depression hss also decreased gradually, currently minimal to absent.  Remainder of MSE is essentially unchanged from February 19, 2024.    Current Psychiatric Medications:  Desipramine 75 mg a day  Abilify 5 mg a day    Lab Tests/Results:  Weight today was 205 pounds, weight October 19, 2024 was 204 pounds and 5 ounces, weight on September 13, 2023 was 198 pounds and 9 ounces, weight on June 27, 2023 was 193 pounds and 5 ounces, weight on March 28, 2023 was 200 pounds and 6 ounces, weight on January 11, 2023 was 198 pounds and 6 ounces.    Following fasting blood work, to monitor potential adverse effects of use of Abilify, was ordered today: Lipid panel (screening for hyperlipidemia), CMP and CBC with differential and platelets (screening for medication side effects), hemoglobin A1c (to monitor for hyperglycemia), serum prolactin level (to monitor for secondary/idiopathic  hyperprolactinemia)    Diagnosis:  Major depression, single episode, mild  Anxiety, unspecified  Seasonal affective disorder  History of vitamin D deficiency    Impression/Assessment:  Jemal has continued to maintain good functioning on a slightly lower dose of desipramine.  Even though his blood levels are well below therapeutic range, he would like to maintain the 75 mg dosage.  We discussed ongoing monitoring his blood work for potential negative effects of Abilify.  We discussed possibly decreasing frequency of our visits to every 6 months in the future.    Plan:  Continue desipramine 75 mg a day for depression and anxiety  Continue Abilify 5 mg a day for depression and anxiety  Obtain fasting blood work, as noted above, prior to next visit  Follow-up with Dr. Padilla in 3 months    Time spent on day of visit 42 minutes - 10 minutes (7:24 AM through 7:34 AM) review of EMR prior to visit, 20 minutes (9:21 AM through 9:41 AM) face-to-face meeting with patient, including discussion of risk/benefits, alternatives and possible side effects to medication, counseling on above issues, and prescription of medications in the EMR, 12 minutes (9:41 AM through 9:53 AM) documentation in the EMR      Vinicius Padilla MD    Answers submitted by the patient for this visit:  LEANA-7 (Submitted on 5/20/2024)  LEANA 7 TOTAL SCORE: 0

## 2024-05-20 NOTE — NURSING NOTE
"Chief Complaint   Patient presents with    Medication Follow-up       Initial There were no vitals taken for this visit. Estimated body mass index is 30.2 kg/m  as calculated from the following:    Height as of 2/19/24: 1.753 m (5' 9\").    Weight as of 2/19/24: 92.8 kg (204 lb 8 oz).  Medication Review: complete    The next two questions are to help us understand your food security.  If you are feeling you need any assistance in this area, we have resources available to support you today.          2/19/2024   SDOH- Food Insecurity   Within the past 12 months, did you worry that your food would run out before you got money to buy more? N   Within the past 12 months, did the food you bought just not last and you didn t have money to get more? N         Health Care Directive:  Patient does not have a Health Care Directive or Living Will: Discussed advance care planning with patient; however, patient declined at this time.    Mally Orozco CNA      "

## 2024-08-19 ENCOUNTER — OFFICE VISIT (OUTPATIENT)
Dept: PSYCHIATRY | Facility: OTHER | Age: 49
End: 2024-08-19
Attending: PSYCHIATRY & NEUROLOGY
Payer: COMMERCIAL

## 2024-08-19 VITALS
DIASTOLIC BLOOD PRESSURE: 93 MMHG | WEIGHT: 200 LBS | SYSTOLIC BLOOD PRESSURE: 132 MMHG | OXYGEN SATURATION: 100 % | RESPIRATION RATE: 17 BRPM | HEIGHT: 69 IN | HEART RATE: 111 BPM | BODY MASS INDEX: 29.62 KG/M2

## 2024-08-19 DIAGNOSIS — F32.A DEPRESSION, UNSPECIFIED DEPRESSION TYPE: Primary | ICD-10-CM

## 2024-08-19 PROCEDURE — 99215 OFFICE O/P EST HI 40 MIN: CPT | Performed by: PSYCHIATRY & NEUROLOGY

## 2024-08-19 ASSESSMENT — ANXIETY QUESTIONNAIRES
GAD7 TOTAL SCORE: 0
6. BECOMING EASILY ANNOYED OR IRRITABLE: NOT AT ALL
2. NOT BEING ABLE TO STOP OR CONTROL WORRYING: NOT AT ALL
1. FEELING NERVOUS, ANXIOUS, OR ON EDGE: NOT AT ALL
5. BEING SO RESTLESS THAT IT IS HARD TO SIT STILL: NOT AT ALL
3. WORRYING TOO MUCH ABOUT DIFFERENT THINGS: NOT AT ALL
8. IF YOU CHECKED OFF ANY PROBLEMS, HOW DIFFICULT HAVE THESE MADE IT FOR YOU TO DO YOUR WORK, TAKE CARE OF THINGS AT HOME, OR GET ALONG WITH OTHER PEOPLE?: NOT DIFFICULT AT ALL
IF YOU CHECKED OFF ANY PROBLEMS ON THIS QUESTIONNAIRE, HOW DIFFICULT HAVE THESE PROBLEMS MADE IT FOR YOU TO DO YOUR WORK, TAKE CARE OF THINGS AT HOME, OR GET ALONG WITH OTHER PEOPLE: NOT DIFFICULT AT ALL
7. FEELING AFRAID AS IF SOMETHING AWFUL MIGHT HAPPEN: NOT AT ALL
GAD7 TOTAL SCORE: 0
4. TROUBLE RELAXING: NOT AT ALL
7. FEELING AFRAID AS IF SOMETHING AWFUL MIGHT HAPPEN: NOT AT ALL
GAD7 TOTAL SCORE: 0

## 2024-08-19 ASSESSMENT — PAIN SCALES - GENERAL: PAINLEVEL: NO PAIN (0)

## 2024-08-19 NOTE — PROGRESS NOTES
Psychiatric Progress Note/Visit  August 19, 2024    Identifying Data:  This is a 49-year-old man seen for follow-up psychiatric medication management visit for treatment of depression, anxiety and seasonal affective disorder.    Interval History:  He was seen most recently May 20, 2024.  At that time he reported that he was continuing to do well on 5 mg of Abilify and 75 mg of desipramine daily.  We reviewed his previous antidepressant blood level that was well below therapeutic range.  We discussed the possibility of further decreasing his desipramine, but he was comfortable keeping everything as it was.    Alireza reports that he has decreased his desipramine from 75 mg a day to 50 mg a day about 3 weeks ago.  He has not noticed any adverse effects or withdrawal.  We discussed the possibility of further decreasing the dosage in the future if he is comfortable doing that.  I encouraged him to get his fasting blood work done so we could monitor effects of Abilify, especially his lipid panel.    Mental Status Exam:  Anxiety has continued to decrease and is essentially absent.  Depression has also decreased and is also considered essentially absent.  Remainder of MSE is essentially unchanged from May 20, 2024.    Current Psychiatric Medications:  Desipramine 50 mg a day  Abilify 5 mg a day    Lab Tests/Results:  Weight today was 200 pounds, weight on May 20, 2024 was 205 pounds, weight on October 19, 2023 was 204 pounds and 5 ounces, weight on September 13, 2023 was 198 pounds and 9 ounces, weight on June 27, 2023 was 193 pounds and 5 ounces, weight on March 28, 2023 was 200 pounds and 6 ounces, weight on July 11, 2023 was 198 pounds and 6 ounces.  Weight on Michelle 15, 2021 (shortly after restarting Abilify) was 195 pounds and 3 ounces.  Fasting blood work was ordered on May 20, 2024, but has not yet been done.  He agrees to get this done prior to next visit.    Diagnosis:  Major depression, single episode, mild  Anxiety,  unspecified  Seasonal affective disorder  History of vitamin D deficiency    Impression/Assessment:  Jemal has decreased his desipramine dose slightly (as we had discussed previously) and appears to be stable with no adverse effects on 50 mg compared to 75 mg.  We discussed the possibility of further decreasing the dosage.  We also discussed the importance of getting his fasting blood work done, to continue to monitor his lipid panel which had increased slightly since being on Abilify.    Plan:  Continue desipramine 50 mg a day (with the option to further decrease to 25 mg a day or discontinue) for depression and anxiety  Continue Abilify 5 mg a day for depression and anxiety  Complete fasting blood work prior to next visit  Follow-up with Dr. Padilla in 3 months    Time spent on day of visit 43 minutes that 7 minutes (7:43 AM through 7:50 AM) review of EMR prior to visit, 23 minutes (9:12 AM through 9:35 AM) face-to-face meeting with patient, including discussion of risk/benefits, alternatives and possible side effects to medication, counseling on above issues, and prescription of medications in the EMR, 13 minutes (9:35 AM through 9:48 AM) documentation in the EMR      Vinicius Padilla MD    Answers submitted by the patient for this visit:  Patient Health Questionnaire (Submitted on 8/19/2024)  If you checked off any problems, how difficult have these problems made it for you to do your work, take care of things at home, or get along with other people?: Not difficult at all  PHQ9 TOTAL SCORE: 0  LEANA-7 (Submitted on 8/19/2024)  LEANA 7 TOTAL SCORE: 0

## 2024-10-17 DIAGNOSIS — F32.A DEPRESSION, UNSPECIFIED DEPRESSION TYPE: ICD-10-CM

## 2024-10-17 RX ORDER — DESIPRAMINE HYDROCHLORIDE 25 MG/1
75 TABLET ORAL DAILY
Qty: 90 TABLET | Refills: 0 | Status: SHIPPED | OUTPATIENT
Start: 2024-10-17

## 2024-10-24 ENCOUNTER — OFFICE VISIT (OUTPATIENT)
Dept: FAMILY MEDICINE | Facility: OTHER | Age: 49
End: 2024-10-24
Attending: FAMILY MEDICINE
Payer: COMMERCIAL

## 2024-10-24 VITALS
TEMPERATURE: 98.3 F | BODY MASS INDEX: 31.1 KG/M2 | WEIGHT: 210 LBS | RESPIRATION RATE: 20 BRPM | DIASTOLIC BLOOD PRESSURE: 88 MMHG | SYSTOLIC BLOOD PRESSURE: 138 MMHG | OXYGEN SATURATION: 99 % | HEIGHT: 69 IN | HEART RATE: 118 BPM

## 2024-10-24 DIAGNOSIS — E22.1 SECONDARY HYPERPROLACTINEMIA (H): ICD-10-CM

## 2024-10-24 DIAGNOSIS — R73.9 HYPERGLYCEMIA: ICD-10-CM

## 2024-10-24 DIAGNOSIS — Z00.00 ROUTINE GENERAL MEDICAL EXAMINATION AT A HEALTH CARE FACILITY: Primary | ICD-10-CM

## 2024-10-24 DIAGNOSIS — N52.9 ERECTILE DYSFUNCTION, UNSPECIFIED ERECTILE DYSFUNCTION TYPE: ICD-10-CM

## 2024-10-24 DIAGNOSIS — Z12.11 SCREEN FOR COLON CANCER: ICD-10-CM

## 2024-10-24 DIAGNOSIS — Z13.220 SCREENING FOR HYPERLIPIDEMIA: ICD-10-CM

## 2024-10-24 LAB
ALBUMIN SERPL BCG-MCNC: 4.6 G/DL (ref 3.5–5.2)
ALP SERPL-CCNC: 66 U/L (ref 40–150)
ALT SERPL W P-5'-P-CCNC: 35 U/L (ref 0–70)
ANION GAP SERPL CALCULATED.3IONS-SCNC: 9 MMOL/L (ref 7–15)
AST SERPL W P-5'-P-CCNC: 24 U/L (ref 0–45)
BASOPHILS # BLD AUTO: 0 10E3/UL (ref 0–0.2)
BASOPHILS NFR BLD AUTO: 1 %
BILIRUB SERPL-MCNC: 0.4 MG/DL
BUN SERPL-MCNC: 20.4 MG/DL (ref 6–20)
CALCIUM SERPL-MCNC: 9.4 MG/DL (ref 8.8–10.4)
CHLORIDE SERPL-SCNC: 103 MMOL/L (ref 98–107)
CHOLEST SERPL-MCNC: 256 MG/DL
CREAT SERPL-MCNC: 1.28 MG/DL (ref 0.67–1.17)
EGFRCR SERPLBLD CKD-EPI 2021: 69 ML/MIN/1.73M2
EOSINOPHIL # BLD AUTO: 0.1 10E3/UL (ref 0–0.7)
EOSINOPHIL NFR BLD AUTO: 2 %
ERYTHROCYTE [DISTWIDTH] IN BLOOD BY AUTOMATED COUNT: 12.6 % (ref 10–15)
EST. AVERAGE GLUCOSE BLD GHB EST-MCNC: 105 MG/DL
FASTING STATUS PATIENT QL REPORTED: NO
FASTING STATUS PATIENT QL REPORTED: NO
GLUCOSE SERPL-MCNC: 100 MG/DL (ref 70–99)
HBA1C MFR BLD: 5.3 %
HCO3 SERPL-SCNC: 28 MMOL/L (ref 22–29)
HCT VFR BLD AUTO: 48.2 % (ref 40–53)
HDLC SERPL-MCNC: 52 MG/DL
HGB BLD-MCNC: 16.4 G/DL (ref 13.3–17.7)
IMM GRANULOCYTES # BLD: 0 10E3/UL
IMM GRANULOCYTES NFR BLD: 1 %
LDLC SERPL CALC-MCNC: 142 MG/DL
LYMPHOCYTES # BLD AUTO: 1.2 10E3/UL (ref 0.8–5.3)
LYMPHOCYTES NFR BLD AUTO: 22 %
MCH RBC QN AUTO: 32.3 PG (ref 26.5–33)
MCHC RBC AUTO-ENTMCNC: 34 G/DL (ref 31.5–36.5)
MCV RBC AUTO: 95 FL (ref 78–100)
MONOCYTES # BLD AUTO: 0.4 10E3/UL (ref 0–1.3)
MONOCYTES NFR BLD AUTO: 8 %
NEUTROPHILS # BLD AUTO: 3.5 10E3/UL (ref 1.6–8.3)
NEUTROPHILS NFR BLD AUTO: 67 %
NONHDLC SERPL-MCNC: 204 MG/DL
NRBC # BLD AUTO: 0 10E3/UL
NRBC BLD AUTO-RTO: 0 /100
PLATELET # BLD AUTO: 162 10E3/UL (ref 150–450)
POTASSIUM SERPL-SCNC: 4 MMOL/L (ref 3.4–5.3)
PROLACTIN SERPL 3RD IS-MCNC: 2 NG/ML (ref 4–15)
PROT SERPL-MCNC: 7.3 G/DL (ref 6.4–8.3)
RBC # BLD AUTO: 5.08 10E6/UL (ref 4.4–5.9)
SODIUM SERPL-SCNC: 140 MMOL/L (ref 135–145)
TRIGL SERPL-MCNC: 309 MG/DL
WBC # BLD AUTO: 5.3 10E3/UL (ref 4–11)

## 2024-10-24 PROCEDURE — 85025 COMPLETE CBC W/AUTO DIFF WBC: CPT | Mod: ZL | Performed by: FAMILY MEDICINE

## 2024-10-24 PROCEDURE — 80061 LIPID PANEL: CPT | Mod: ZL | Performed by: FAMILY MEDICINE

## 2024-10-24 PROCEDURE — 83036 HEMOGLOBIN GLYCOSYLATED A1C: CPT | Mod: ZL | Performed by: FAMILY MEDICINE

## 2024-10-24 PROCEDURE — 99396 PREV VISIT EST AGE 40-64: CPT | Performed by: FAMILY MEDICINE

## 2024-10-24 PROCEDURE — 80053 COMPREHEN METABOLIC PANEL: CPT | Mod: ZL | Performed by: FAMILY MEDICINE

## 2024-10-24 PROCEDURE — 84146 ASSAY OF PROLACTIN: CPT | Mod: ZL | Performed by: FAMILY MEDICINE

## 2024-10-24 PROCEDURE — 36415 COLL VENOUS BLD VENIPUNCTURE: CPT | Mod: ZL | Performed by: FAMILY MEDICINE

## 2024-10-24 RX ORDER — SILDENAFIL 100 MG/1
100 TABLET, FILM COATED ORAL PRN
Qty: 30 TABLET | Refills: 4 | Status: SHIPPED | OUTPATIENT
Start: 2024-10-24

## 2024-10-24 SDOH — HEALTH STABILITY: PHYSICAL HEALTH: ON AVERAGE, HOW MANY MINUTES DO YOU ENGAGE IN EXERCISE AT THIS LEVEL?: 10 MIN

## 2024-10-24 SDOH — HEALTH STABILITY: PHYSICAL HEALTH: ON AVERAGE, HOW MANY DAYS PER WEEK DO YOU ENGAGE IN MODERATE TO STRENUOUS EXERCISE (LIKE A BRISK WALK)?: 2 DAYS

## 2024-10-24 ASSESSMENT — PAIN SCALES - GENERAL: PAINLEVEL_OUTOF10: NO PAIN (0)

## 2024-10-24 ASSESSMENT — SOCIAL DETERMINANTS OF HEALTH (SDOH): HOW OFTEN DO YOU GET TOGETHER WITH FRIENDS OR RELATIVES?: TWICE A WEEK

## 2024-10-24 NOTE — PROGRESS NOTES
SUBJECTIVE:   Meliton Finnegan is a 49 year old male who presents to clinic today for the following health issues:  The 10-year ASCVD risk score (Gil BHATT, et al., 2019) is: 4.1%    Values used to calculate the score:      Age: 49 years      Sex: Male      Is Non- : No      Diabetic: No      Tobacco smoker: No      Systolic Blood Pressure: 138 mmHg      Is BP treated: No      HDL Cholesterol: 52 mg/dL      Total Cholesterol: 256 mg/dL      Patient arrives here for his annual physical.  He is also in need of colonoscopy.  Satisfactory also also discussed Cologuard.        Patient Active Problem List    Diagnosis Date Noted     Seasonal affective disorder (H) 11/19/2018     Priority: Medium     Hypercholesterolemia 01/30/2018     Priority: Medium     Overview:   mild       Venereal wart 01/30/2018     Priority: Medium     Epigastric pain 12/06/2017     Priority: Medium     Partial tear of left subscapularis tendon, initial encounter 05/02/2017     Priority: Medium     Adjustment disorder with anxious mood 01/11/2017     Priority: Medium     BPPV (benign paroxysmal positional vertigo) 11/15/2016     Priority: Medium     S/P shoulder surgery 10/26/2016     Priority: Medium     Overview:   Bilateral       Subluxation of tendon of long head of biceps 05/17/2016     Priority: Medium     Labral tear of shoulder, right, initial encounter 05/17/2016     Priority: Medium     Incomplete tear of right rotator cuff 05/17/2016     Priority: Medium     Acromioclavicular joint arthritis 05/17/2016     Priority: Medium     Gastroesophageal reflux disease without esophagitis 12/14/2015     Priority: Medium     Past Medical History:   Diagnosis Date     Epigastric pain     12/6/2017      Past Surgical History:   Procedure Laterality Date     ESOPHAGOSCOPY, GASTROSCOPY, DUODENOSCOPY (EGD), COMBINED      12/7/2017     EXTRACTION(S) DENTAL      under anesthesia     FINGER SURGERY      Tendon repair, right fifth  "finger, under anesthesia     OTHER SURGICAL HISTORY      08523,OUTER EAR SURGERY,Left ear surgery for cartilage reconstruction     OTHER SURGICAL HISTORY      2017,LSF442,SHOULDER SURGERY,Bilateral     VASECTOMY      No Comments Provided     VASECTOMY      reversal scheduled for 12/06       Review of Systems     OBJECTIVE:     /88   Pulse 118   Temp 98.3  F (36.8  C)   Resp 20   Ht 1.753 m (5' 9\")   Wt 95.3 kg (210 lb)   SpO2 99%   BMI 31.01 kg/m    Body mass index is 31.01 kg/m .  Physical Exam    Diagnostic Test Results:  Results for orders placed or performed in visit on 10/24/24 (from the past 24 hours)   Lipid Profile   Result Value Ref Range    Cholesterol 256 (H) <200 mg/dL    Triglycerides 309 (H) <150 mg/dL    Direct Measure HDL 52 >=40 mg/dL    LDL Cholesterol Calculated 142 (H) <100 mg/dL    Non HDL Cholesterol 204 (H) <130 mg/dL    Patient Fasting > 8hrs? No     Narrative    Cholesterol  Desirable: < 200 mg/dL  Borderline High: 200 - 239 mg/dL  High: >= 240 mg/dL    Triglycerides  Normal: < 150 mg/dL  Borderline High: 150 - 199 mg/dL  High: 200-499 mg/dL  Very High: >= 500 mg/dL    Direct Measure HDL  Female: >= 50 mg/dL   Male: >= 40 mg/dL    LDL Cholesterol  Desirable: < 100 mg/dL  Above Desirable: 100 - 129 mg/dL   Borderline High: 130 - 159 mg/dL   High:  160 - 189 mg/dL   Very High: >= 190 mg/dL    Non HDL Cholesterol  Desirable: < 130 mg/dL  Above Desirable: 130 - 159 mg/dL  Borderline High: 160 - 189 mg/dL  High: 190 - 219 mg/dL  Very High: >= 220 mg/dL   Comprehensive Metabolic Panel   Result Value Ref Range    Sodium 140 135 - 145 mmol/L    Potassium 4.0 3.4 - 5.3 mmol/L    Carbon Dioxide (CO2) 28 22 - 29 mmol/L    Anion Gap 9 7 - 15 mmol/L    Urea Nitrogen 20.4 (H) 6.0 - 20.0 mg/dL    Creatinine 1.28 (H) 0.67 - 1.17 mg/dL    GFR Estimate 69 >60 mL/min/1.73m2    Calcium 9.4 8.8 - 10.4 mg/dL    Chloride 103 98 - 107 mmol/L    Glucose 100 (H) 70 - 99 mg/dL    Alkaline Phosphatase 66 " 40 - 150 U/L    AST 24 0 - 45 U/L    ALT 35 0 - 70 U/L    Protein Total 7.3 6.4 - 8.3 g/dL    Albumin 4.6 3.5 - 5.2 g/dL    Bilirubin Total 0.4 <=1.2 mg/dL    Patient Fasting > 8hrs? No    CBC and Differential    Narrative    The following orders were created for panel order CBC and Differential.  Procedure                               Abnormality         Status                     ---------                               -----------         ------                     CBC with platelets and d...[[id number]]                      Final result                 Please view results for these tests on the individual orders.   Hemoglobin A1c   Result Value Ref Range    Estimated Average Glucose 105 <117 mg/dL    Hemoglobin A1C 5.3 <5.7 %   Prolactin   Result Value Ref Range    Prolactin 2 (L) 4 - 15 ng/mL   CBC with platelets and differential   Result Value Ref Range    WBC Count 5.3 4.0 - 11.0 10e3/uL    RBC Count 5.08 4.40 - 5.90 10e6/uL    Hemoglobin 16.4 13.3 - 17.7 g/dL    Hematocrit 48.2 40.0 - 53.0 %    MCV 95 78 - 100 fL    MCH 32.3 26.5 - 33.0 pg    MCHC 34.0 31.5 - 36.5 g/dL    RDW 12.6 10.0 - 15.0 %    Platelet Count 162 150 - 450 10e3/uL    % Neutrophils 67 %    % Lymphocytes 22 %    % Monocytes 8 %    % Eosinophils 2 %    % Basophils 1 %    % Immature Granulocytes 1 %    NRBCs per 100 WBC 0 <1 /100    Absolute Neutrophils 3.5 1.6 - 8.3 10e3/uL    Absolute Lymphocytes 1.2 0.8 - 5.3 10e3/uL    Absolute Monocytes 0.4 0.0 - 1.3 10e3/uL    Absolute Eosinophils 0.1 0.0 - 0.7 10e3/uL    Absolute Basophils 0.0 0.0 - 0.2 10e3/uL    Absolute Immature Granulocytes 0.0 <=0.4 10e3/uL    Absolute NRBCs 0.0 10e3/uL       ASSESSMENT/PLAN:         (Z00.00) Routine general medical examination at a health care facility  (primary encounter diagnosis)  Comment: Preventive preventive  Plan:     (N52.9) Erectile dysfunction, unspecified erectile dysfunction type  Comment:   Plan: sildenafil (VIAGRA) 100 MG tablet            (Z12.11)  Screen for colon cancer  Comment:   Plan: Colonoscopy Screening  Referral  Discussed Cologuard    (Z13.220) Screening for hyperlipidemia  Comment:   Plan: Satisfactory cardiac risk score    (R73.9) Hyperglycemia  Comment:   Plan:     (E22.1) Secondary hyperprolactinemia (H)  Comment:   Plan: Per psychiatry      Prashanth Metz MD  [facility]

## 2024-10-24 NOTE — NURSING NOTE
Patient here for annual physical and medication refills. Last eye exam 2023 and last dental exam 2023. No concerns today. Medication Reconciliation: complete.    Patricia Good LPN  10/24/2024 9:11 AM

## 2024-10-25 DIAGNOSIS — Z12.11 ENCOUNTER FOR SCREENING COLONOSCOPY: Primary | ICD-10-CM

## 2024-10-25 NOTE — TELEPHONE ENCOUNTER
Screening Questions for the Scheduling of Screening Colonoscopies   (If Colonoscopy is diagnostic, Provider should review the chart before scheduling.)  Are you younger than 45 or older than 80?  NO  Do you take aspirin or fish oil?  NO (if yes, tell patient to stop 1 week prior to Colonoscopy)  Do you take warfarin (Coumadin), clopidogrel (Plavix), apixaban (Eliquis), dabigatram (Pradaxa), rivaroxaban (Xarelto) or any blood thinner? NO  Do you take semaglutide (Ozempic or Wegovy), tirzepatide (Mounjaro or Zepbound), liraglutide (Victoza), or dulaglutide (Trulicity)? NO  Do you use oxygen or a CPAP at home?  NO  Do you have kidney disease? NO  Are you on dialysis? NO  Have you had a stroke or heart attack in the last year? NO  Have you had a stent in your heart or any blood vessel in the last year? NO  Have you had a transplant of any organ? NO  Have you had a colonoscopy or upper endoscopy (EGD) before? NO  Date of scheduled Colonoscopy. 01/20/2025  Provider [facility]  Pharmacy [facility]

## 2024-10-28 RX ORDER — POLYETHYLENE GLYCOL 3350, SODIUM CHLORIDE, SODIUM BICARBONATE, POTASSIUM CHLORIDE 420; 11.2; 5.72; 1.48 G/4L; G/4L; G/4L; G/4L
4000 POWDER, FOR SOLUTION ORAL ONCE
Qty: 4000 ML | Refills: 0 | Status: SHIPPED | OUTPATIENT
Start: 2025-01-13 | End: 2025-01-13

## 2024-10-28 RX ORDER — BISACODYL 5 MG/1
TABLET, DELAYED RELEASE ORAL
Qty: 2 TABLET | Refills: 0 | Status: SHIPPED | OUTPATIENT
Start: 2025-01-13

## 2024-11-18 ENCOUNTER — OFFICE VISIT (OUTPATIENT)
Dept: PSYCHIATRY | Facility: OTHER | Age: 49
End: 2024-11-18
Attending: PSYCHIATRY & NEUROLOGY
Payer: COMMERCIAL

## 2024-11-18 VITALS
BODY MASS INDEX: 30.62 KG/M2 | TEMPERATURE: 97.3 F | RESPIRATION RATE: 12 BRPM | OXYGEN SATURATION: 99 % | HEART RATE: 101 BPM | WEIGHT: 206.7 LBS | HEIGHT: 69 IN | SYSTOLIC BLOOD PRESSURE: 143 MMHG | DIASTOLIC BLOOD PRESSURE: 96 MMHG

## 2024-11-18 DIAGNOSIS — F32.A DEPRESSION, UNSPECIFIED DEPRESSION TYPE: ICD-10-CM

## 2024-11-18 RX ORDER — DESIPRAMINE HYDROCHLORIDE 25 MG/1
50 TABLET ORAL DAILY
Qty: 180 TABLET | Refills: 1 | Status: SHIPPED | OUTPATIENT
Start: 2024-11-18

## 2024-11-18 ASSESSMENT — ANXIETY QUESTIONNAIRES
8. IF YOU CHECKED OFF ANY PROBLEMS, HOW DIFFICULT HAVE THESE MADE IT FOR YOU TO DO YOUR WORK, TAKE CARE OF THINGS AT HOME, OR GET ALONG WITH OTHER PEOPLE?: NOT DIFFICULT AT ALL
5. BEING SO RESTLESS THAT IT IS HARD TO SIT STILL: NOT AT ALL
7. FEELING AFRAID AS IF SOMETHING AWFUL MIGHT HAPPEN: NOT AT ALL
IF YOU CHECKED OFF ANY PROBLEMS ON THIS QUESTIONNAIRE, HOW DIFFICULT HAVE THESE PROBLEMS MADE IT FOR YOU TO DO YOUR WORK, TAKE CARE OF THINGS AT HOME, OR GET ALONG WITH OTHER PEOPLE: NOT DIFFICULT AT ALL
2. NOT BEING ABLE TO STOP OR CONTROL WORRYING: NOT AT ALL
4. TROUBLE RELAXING: NOT AT ALL
7. FEELING AFRAID AS IF SOMETHING AWFUL MIGHT HAPPEN: NOT AT ALL
1. FEELING NERVOUS, ANXIOUS, OR ON EDGE: NOT AT ALL
GAD7 TOTAL SCORE: 0
6. BECOMING EASILY ANNOYED OR IRRITABLE: NOT AT ALL
3. WORRYING TOO MUCH ABOUT DIFFERENT THINGS: NOT AT ALL
GAD7 TOTAL SCORE: 0
GAD7 TOTAL SCORE: 0

## 2024-11-18 ASSESSMENT — PAIN SCALES - GENERAL: PAINLEVEL_OUTOF10: NO PAIN (0)

## 2024-11-18 NOTE — PROGRESS NOTES
"Psychiatric Progress Note/Visit  November 18, 2024    Identifying Data:  This is a 49-year-old man seen for psychiatric medication management visit for treatment of depression, anxiety and seasonal affective disorder.    Interval History:  He was seen most recently on August 19, 2024.  At that time he reported that he was able to decrease his imipramine from 75 mg a day to 50 mg a day without any adverse effects and with no loss of clinical effectiveness.  We did not make any medication changes.    Today he reports that he continues to do reasonably well, but says that he is \"lacking motivation\".  He is able to get done what needs to get done, but finds it difficult to be motivated to do other things that he would like to do.  He says that this has been a longstanding problem.  We discussed possible treatment options and reviewed diagnosis of ADHD.  He reports that he had difficulty throughout his schooling and one of his daughters has been diagnosed ADHD and been treated for this.  We also reviewed his diagnosis of seasonal affective disorder and he has not yet started to use full-spectrum light therapy.  I encouraged him to use his full-spectrum light at least 30 minutes a day and that this may help with his overall mood and motivation.    Mental Status Exam:  Anxiety is considered essentially absent.  Depression is also considered essentially absent.  Remainder of MSE is essentially unchanged from August 19, 2024.    Current Psychiatric Medications:  Desipramine 50 mg a day  Abilify 5 mg a day    Lab Tests/Results:  Fasting blood work was done on October 24, 2024 (at 9:55 AM) with the following results: Serum prolactin level 2, hemoglobin A1c 5.2, CBC with differential and platelets within normal limits, CMP showed no significant laboratory abnormalities (BUN slightly elevated at 20.4, creatinine slightly elevated at 1.28, glucose minimally elevated at 100), lipid panel showed cholesterol increased at 256 (256 1 " year ago, 235 two years ago), triglycerides increased at 309 (261, 237), LDL increased at 142 (152, 148), non-HDL cholesterol increased at 204 (204, 195), HDL within normal limits at 52 (52, 40)  Weight today was 206 pounds and 7 ounces, weight on August 19, 2024 was 200 pounds, weight on May 20, 2024 was 205 pounds, weight on October 19, 2023 was 204 pounds and 5 ounces, weight on September 13, 2023 was 198 pounds and 9 ounces, weight on Michelle 15, 2021 (shortly after restarting Abilify) was 195 pounds and 3 ounces.    Diagnosis:  Major depression, single episode, mild  Anxiety, unspecified  Seasonal affective disorder  ADHD  History of vitamin D deficiency    Impression/Assessment:  Jemal continues to do well on modest dosages of his 2 psychiatric medications.  I have encouraged him to start using his full-spectrum light to treat his seasonal affective disorder.  We discussed a possible trial of low-dose Adderall to help with motivation, focus and attention, and completing tasks.  At this time he wants to wait on this option.    Plan:  Continue desipramine 50 mg a day for depression  Continue Abilify 5 mg a day for depression and anxiety  Start using full-spectrum light therapy at least 30 minutes a day through the end of March or April for seasonal affective disorder  Consider trial of low-dose Adderall for ADHD and possibly to help increase motivation  Follow-up with Dr. Padilla in 3 months    Time spent on day of visit 47 minutes - 10 minutes (7:45 AM through 7:55 AM) review of EMR prior to visit, 23 minutes (9:01 AM to 9:24 AM) face-to-face meeting with patient, including discussion of risk/benefits, alternatives and possible side effects to medication, counseling on above issues, and prescription of medications in the EMR, 14 minutes (9:24 AM through 9:38 AM) documentation in the EMR      Vinicius Padilla MD    Answers submitted by the patient for this visit:  Patient Health Questionnaire (Submitted on  11/18/2024)  If you checked off any problems, how difficult have these problems made it for you to do your work, take care of things at home, or get along with other people?: Not difficult at all  PHQ9 TOTAL SCORE: 0  Patient Health Questionnaire (G7) (Submitted on 11/18/2024)  LEANA 7 TOTAL SCORE: 0

## 2024-11-18 NOTE — NURSING NOTE
"Chief Complaint   Patient presents with    Medication Therapy Management       Initial BP (!) 143/96 (BP Location: Right arm, Patient Position: Sitting, Cuff Size: Adult Large)   Pulse 101   Temp 97.3  F (36.3  C) (Tympanic)   Resp 12   Ht 1.753 m (5' 9\")   Wt 93.8 kg (206 lb 11.2 oz)   SpO2 99%   BMI 30.52 kg/m   Estimated body mass index is 30.52 kg/m  as calculated from the following:    Height as of this encounter: 1.753 m (5' 9\").    Weight as of this encounter: 93.8 kg (206 lb 11.2 oz).  Medication Review: complete    The next two questions are to help us understand your food security.  If you are feeling you need any assistance in this area, we have resources available to support you today.          10/24/2024   SDOH- Food Insecurity   Within the past 12 months, did you worry that your food would run out before you got money to buy more? N    Within the past 12 months, did the food you bought just not last and you didn t have money to get more? N        Patient-reported           Dustin Hinds      "

## 2024-12-10 DIAGNOSIS — F32.A DEPRESSION, UNSPECIFIED DEPRESSION TYPE: ICD-10-CM

## 2024-12-10 RX ORDER — ARIPIPRAZOLE 5 MG/1
TABLET ORAL
Qty: 135 TABLET | Refills: 0 | Status: SHIPPED | OUTPATIENT
Start: 2024-12-10

## 2025-01-18 PROBLEM — Z00.00 HEALTHCARE MAINTENANCE: Status: ACTIVE | Noted: 2025-01-18

## 2025-01-20 ENCOUNTER — HOSPITAL ENCOUNTER (OUTPATIENT)
Facility: OTHER | Age: 50
Discharge: HOME | End: 2025-01-20
Attending: SURGERY | Admitting: SURGERY
Payer: COMMERCIAL

## 2025-01-20 ENCOUNTER — ANESTHESIA (OUTPATIENT)
Dept: SURGERY | Facility: OTHER | Age: 50
End: 2025-01-20
Payer: COMMERCIAL

## 2025-01-20 ENCOUNTER — ANESTHESIA EVENT (OUTPATIENT)
Dept: SURGERY | Facility: OTHER | Age: 50
End: 2025-01-20
Payer: COMMERCIAL

## 2025-01-20 VITALS
TEMPERATURE: 96.7 F | HEART RATE: 82 BPM | RESPIRATION RATE: 16 BRPM | DIASTOLIC BLOOD PRESSURE: 75 MMHG | BODY MASS INDEX: 29.45 KG/M2 | WEIGHT: 199.4 LBS | OXYGEN SATURATION: 98 % | SYSTOLIC BLOOD PRESSURE: 114 MMHG

## 2025-01-20 PROBLEM — K57.30 SIGMOID DIVERTICULOSIS: Status: ACTIVE | Noted: 2025-01-20

## 2025-01-20 PROBLEM — K63.5 COLON POLYP: Status: ACTIVE | Noted: 2025-01-20

## 2025-01-20 PROCEDURE — 250N000009 HC RX 250: Performed by: NURSE ANESTHETIST, CERTIFIED REGISTERED

## 2025-01-20 PROCEDURE — 999N000010 HC STATISTIC ANES STAT CODE-CRNA PER MINUTE: Performed by: SURGERY

## 2025-01-20 PROCEDURE — 250N000011 HC RX IP 250 OP 636: Performed by: NURSE ANESTHETIST, CERTIFIED REGISTERED

## 2025-01-20 PROCEDURE — 45378 DIAGNOSTIC COLONOSCOPY: CPT | Performed by: SURGERY

## 2025-01-20 PROCEDURE — 258N000003 HC RX IP 258 OP 636: Performed by: SURGERY

## 2025-01-20 PROCEDURE — 45380 COLONOSCOPY AND BIOPSY: CPT | Mod: PT | Performed by: SURGERY

## 2025-01-20 PROCEDURE — 88305 TISSUE EXAM BY PATHOLOGIST: CPT

## 2025-01-20 RX ORDER — SODIUM CHLORIDE, SODIUM LACTATE, POTASSIUM CHLORIDE, CALCIUM CHLORIDE 600; 310; 30; 20 MG/100ML; MG/100ML; MG/100ML; MG/100ML
INJECTION, SOLUTION INTRAVENOUS CONTINUOUS
Status: DISCONTINUED | OUTPATIENT
Start: 2025-01-20 | End: 2025-01-20 | Stop reason: HOSPADM

## 2025-01-20 RX ORDER — POLYETHYLENE GLYCOL 3350, SODIUM CHLORIDE, SODIUM BICARBONATE, POTASSIUM CHLORIDE 420; 11.2; 5.72; 1.48 G/4L; G/4L; G/4L; G/4L
4000 POWDER, FOR SOLUTION ORAL ONCE
Status: ON HOLD | COMMUNITY
Start: 2025-01-14 | End: 2025-01-20

## 2025-01-20 RX ORDER — PROPOFOL 10 MG/ML
INJECTION, EMULSION INTRAVENOUS CONTINUOUS PRN
Status: DISCONTINUED | OUTPATIENT
Start: 2025-01-20 | End: 2025-01-20

## 2025-01-20 RX ORDER — NALOXONE HYDROCHLORIDE 0.4 MG/ML
0.4 INJECTION, SOLUTION INTRAMUSCULAR; INTRAVENOUS; SUBCUTANEOUS
Status: DISCONTINUED | OUTPATIENT
Start: 2025-01-20 | End: 2025-01-20 | Stop reason: HOSPADM

## 2025-01-20 RX ORDER — PROPOFOL 10 MG/ML
INJECTION, EMULSION INTRAVENOUS PRN
Status: DISCONTINUED | OUTPATIENT
Start: 2025-01-20 | End: 2025-01-20

## 2025-01-20 RX ORDER — NALOXONE HYDROCHLORIDE 0.4 MG/ML
0.2 INJECTION, SOLUTION INTRAMUSCULAR; INTRAVENOUS; SUBCUTANEOUS
Status: DISCONTINUED | OUTPATIENT
Start: 2025-01-20 | End: 2025-01-20 | Stop reason: HOSPADM

## 2025-01-20 RX ORDER — LIDOCAINE 40 MG/G
CREAM TOPICAL
Status: DISCONTINUED | OUTPATIENT
Start: 2025-01-20 | End: 2025-01-20 | Stop reason: HOSPADM

## 2025-01-20 RX ORDER — ONDANSETRON 2 MG/ML
4 INJECTION INTRAMUSCULAR; INTRAVENOUS
Status: DISCONTINUED | OUTPATIENT
Start: 2025-01-20 | End: 2025-01-20 | Stop reason: HOSPADM

## 2025-01-20 RX ORDER — FLUMAZENIL 0.1 MG/ML
0.2 INJECTION, SOLUTION INTRAVENOUS
Status: DISCONTINUED | OUTPATIENT
Start: 2025-01-20 | End: 2025-01-20 | Stop reason: HOSPADM

## 2025-01-20 RX ORDER — LIDOCAINE HYDROCHLORIDE 20 MG/ML
INJECTION, SOLUTION INFILTRATION; PERINEURAL PRN
Status: DISCONTINUED | OUTPATIENT
Start: 2025-01-20 | End: 2025-01-20

## 2025-01-20 RX ADMIN — PROPOFOL 100 MG: 10 INJECTION, EMULSION INTRAVENOUS at 08:08

## 2025-01-20 RX ADMIN — LIDOCAINE HYDROCHLORIDE 40 MG: 20 INJECTION, SOLUTION INFILTRATION; PERINEURAL at 08:08

## 2025-01-20 RX ADMIN — SODIUM CHLORIDE, POTASSIUM CHLORIDE, SODIUM LACTATE AND CALCIUM CHLORIDE: 600; 310; 30; 20 INJECTION, SOLUTION INTRAVENOUS at 07:50

## 2025-01-20 RX ADMIN — PROPOFOL 150 MCG/KG/MIN: 10 INJECTION, EMULSION INTRAVENOUS at 08:08

## 2025-01-20 ASSESSMENT — ACTIVITIES OF DAILY LIVING (ADL)
ADLS_ACUITY_SCORE: 41
ADLS_ACUITY_SCORE: 42

## 2025-01-20 NOTE — ANESTHESIA POSTPROCEDURE EVALUATION
Patient: Meliton Finnegan    Procedure: Procedure(s):  Colonoscopy WITH POLYPECTOMY       Anesthesia Type:  MAC    Note:  Disposition: Outpatient   Postop Pain Control: Uneventful            Sign Out: Well controlled pain   PONV: No   Neuro/Psych: Uneventful            Sign Out: Acceptable/Baseline neuro status   Airway/Respiratory: Uneventful            Sign Out: Acceptable/Baseline resp. status   CV/Hemodynamics: Uneventful            Sign Out: Acceptable CV status; No obvious hypovolemia; No obvious fluid overload   Other NRE: NONE   DID A NON-ROUTINE EVENT OCCUR? No           Last vitals:  Vitals Value Taken Time   /75 01/20/25 0900   Temp 96.7  F (35.9  C) 01/20/25 0836   Pulse 82 01/20/25 0900   Resp 16 01/20/25 0836   SpO2 94 % 01/20/25 0900   Vitals shown include unfiled device data.    Electronically Signed By: RAJIV ART CRNA  January 20, 2025  11:41 AM

## 2025-01-20 NOTE — ANESTHESIA PREPROCEDURE EVALUATION
Anesthesia Pre-Procedure Evaluation    Patient: Meliton Finnegan   MRN: [id number] : 1975        Procedure : Procedure(s):  Colonoscopy          Past Medical History:   Diagnosis Date     Epigastric pain     2017      Past Surgical History:   Procedure Laterality Date     ESOPHAGOSCOPY, GASTROSCOPY, DUODENOSCOPY (EGD), COMBINED      2017     EXTRACTION(S) DENTAL      under anesthesia     FINGER SURGERY      Tendon repair, right fifth finger, under anesthesia     OTHER SURGICAL HISTORY      92034,OUTER EAR SURGERY,Left ear surgery for cartilage reconstruction     OTHER SURGICAL HISTORY      ,[id number],SHOULDER SURGERY,Bilateral     VASECTOMY      No Comments Provided     VASECTOMY      reversal scheduled for       No Known Allergies   Social History     Tobacco Use     Smoking status: Never     Smokeless tobacco: Former     Types: Chew     Quit date: 2024   Substance Use Topics     Alcohol use: Not Currently     Comment: Alcoholic Drinks/day: Occasional      Wt Readings from Last 1 Encounters:   25 90.4 kg (199 lb 6.4 oz)        Anesthesia Evaluation   Pt has had prior anesthetic.     No history of anesthetic complications       ROS/MED HX  ENT/Pulmonary:       Neurologic:       Cardiovascular:       METS/Exercise Tolerance: >4 METS    Hematologic:       Musculoskeletal:       GI/Hepatic:     (+) GERD,                   Renal/Genitourinary:       Endo:       Psychiatric/Substance Use:       Infectious Disease:       Malignancy:       Other:          Physical Exam    Airway  airway exam normal      Mallampati: II   TM distance: > 3 FB   Neck ROM: full   Mouth opening: > 3 cm    Respiratory Devices and Support         Dental       (+) Completely normal teeth      Cardiovascular   cardiovascular exam normal       Rhythm and rate: regular and normal     Pulmonary   pulmonary exam normal        breath sounds clear to auscultation       OUTSIDE LABS:  CBC:   Lab Results   Component Value  "Date    WBC 5.3 10/24/2024    WBC 5.6 06/12/2023    HGB 16.4 10/24/2024    HGB 16.4 06/12/2023    HCT 48.2 10/24/2024    HCT 47.9 06/12/2023     10/24/2024     06/12/2023     BMP:   Lab Results   Component Value Date     10/24/2024     06/12/2023    POTASSIUM 4.0 10/24/2024    POTASSIUM 4.1 06/12/2023    CHLORIDE 103 10/24/2024    CHLORIDE 103 06/12/2023    CO2 28 10/24/2024    CO2 28 06/12/2023    BUN 20.4 (H) 10/24/2024    BUN 17.7 06/12/2023    CR 1.28 (H) 10/24/2024    CR 1.12 06/12/2023     (H) 10/24/2024     (H) 06/12/2023     COAGS: No results found for: \"PTT\", \"INR\", \"FIBR\"  POC: No results found for: \"BGM\", \"HCG\", \"HCGS\"  HEPATIC:   Lab Results   Component Value Date    ALBUMIN 4.6 10/24/2024    PROTTOTAL 7.3 10/24/2024    ALT 35 10/24/2024    AST 24 10/24/2024    ALKPHOS 66 10/24/2024    BILITOTAL 0.4 10/24/2024     OTHER:   Lab Results   Component Value Date    A1C 5.3 10/24/2024    SHERIE 9.4 10/24/2024    CRP 0.0 05/23/2018       Anesthesia Plan    ASA Status:  2    NPO Status:  NPO Appropriate    Anesthesia Type: MAC.     - Reason for MAC: straight local not clinically adequate   Induction: Intravenous.   Maintenance: Balanced.        Consents    Anesthesia Plan(s) and associated risks, benefits, and realistic alternatives discussed. Questions answered and patient/representative(s) expressed understanding.     - Discussed: Risks, Benefits and Alternatives for BOTH SEDATION and the PROCEDURE were discussed     - Discussed with:  Patient, Spouse            Postoperative Care            Comments:    Other Comments: Risks, benefits and alternatives discussed and would like to proceed. General anesthesia ok if indicated.              RAJIV Waller CRNA    I have reviewed the pertinent notes and labs in the chart from the past 30 days and (re)examined the patient.  Any updates or changes from those notes are reflected in this note.                             "

## 2025-01-20 NOTE — DISCHARGE INSTRUCTIONS
[facility]  Adult Discharge Orders & Instructions      For 24 hours after surgery:  Get plenty of rest.  A responsible adult must stay with you for at least 24 hours after you leave the hospital.   You may feel lightheaded.  IF so, sit for a few minutes before standing.  Have someone help you get up.   You may have a slight fever. Call the doctor if your fever is over 101 F (38.3 C) (taken under the tongue) or lasts longer than 24 hours.  You may have a dry mouth, a sore throat, muscle aches or trouble sleeping.  These should go away after 24 hours.  Do not make important or legal decisions.  6.   Do not drive or use heavy equipment.  If you have weakness or tingling, don't drive or use heavy equipment until this feeling goes away.                                                                                                                                                                         To contact a doctor, call    [phone]______________

## 2025-01-20 NOTE — OR NURSING
Patient has been discharged to home at 0915 via ambulatory accompanied by Radha ROY RN    Written discharge instructions were provided to patient.  Prescriptions were none.  Patient states their pain is none, and denies any nausea or dizziness upon discharge.    Patient and adult caring for them verbalize understanding of discharge instructions including no driving until tomorrow and no longer taking narcotic pain medications - no operating mechanical equipment and no making any important decisions.They understand reason for discharge, and necessary follow-up appointments.

## 2025-01-20 NOTE — OP NOTE
PROCEDURE NOTE    DATE OF SERVICE: 1/20/2025    SURGEON: Alen Ariza MD    PRE-OP DIAGNOSIS:    Healthcare maintenance   Screening      POST-OP DIAGNOSIS:  Same  Sigmoid Diverticulosis  Polyp at rectum    PROCEDURE:   Colonoscopy with cold biopsy      ANESTHESIA:  EMORY Hall CRNA    INDICATION FOR THE PROCEDURE: The patient is a 49 year old male in need of Healthcare maintenance  . The patient has no other complaints  . After explaining the risks to include bleeding, perforation, potential inability toreach the cecum, the patient wished to proceed.    PROCEDURE:After adequate sedation, the patient was in the left lateral decubitus position.  Rectal exam was performed.  There was normal tone and no palpable masses .  The colonoscope was introduced into the rectum and advanced to the cecum with Mild difficulty.  The patient's prep was excellent.  The terminal cecum was reached.  The cecum, ascending, transverse, descending and sigmoid colon was with sigmoid diverticulosis .  The scope was retroflexed in the rectum.  The rectum was remarkable for a 0.3 cm raised polyp that was removed by Jumbo forceps .  The scope was straightened and removed.  The patient tolerated the procedure well.     ESTIMATED BLOOD LOSS: none    COMPLICATIONS:  None    TISSUE REMOVED:  Yes    RECOMMEND:      Follow-up pending pathology  Fiber  Given literature on diverticulosis    Alen Ariza MD FACS

## 2025-01-20 NOTE — ANESTHESIA CARE TRANSFER NOTE
Patient: Meliton Finnegan    Procedure: Procedure(s):  Colonoscopy WITH POLYPECTOMY       Diagnosis: Colon cancer screening [Z12.11]  Diagnosis Additional Information: No value filed.    Anesthesia Type:   MAC     Note:    Oropharynx: oropharynx clear of all foreign objects  Level of Consciousness: awake  Oxygen Supplementation: room air    Independent Airway: airway patency satisfactory and stable  Dentition: dentition unchanged  Vital Signs Stable: post-procedure vital signs reviewed and stable  Report to RN Given: handoff report given  Patient transferred to: Phase II    Handoff Report: Identifed the Patient, Identified the Reponsible Provider, Reviewed the pertinent medical history, Discussed the surgical course, Reviewed Intra-OP anesthesia mangement and issues during anesthesia, Set expectations for post-procedure period and Allowed opportunity for questions and acknowledgement of understanding      Vitals:  Vitals Value Taken Time   BP     Temp     Pulse     Resp     SpO2         Electronically Signed By: RAJIV ART CRNA  January 20, 2025  8:36 AM

## 2025-01-20 NOTE — H&P
History and Physical    CHIEF COMPLAINT / REASON FOR PROCEDURE:  Healthcare maintenance      PERTINENT HISTORY   Patient is a 49 year old male who presents today for screening colonoscopy for Healthcare maintenance .   First colonoscopy.  Patient has no complaints.    Past Medical History:   Diagnosis Date    Epigastric pain     12/6/2017     Past Surgical History:   Procedure Laterality Date    ESOPHAGOSCOPY, GASTROSCOPY, DUODENOSCOPY (EGD), COMBINED      12/7/2017    EXTRACTION(S) DENTAL      under anesthesia    FINGER SURGERY      Tendon repair, right fifth finger, under anesthesia    OTHER SURGICAL HISTORY      90048,OUTER EAR SURGERY,Left ear surgery for cartilage reconstruction    OTHER SURGICAL HISTORY      2017,[id number],SHOULDER SURGERY,Bilateral    VASECTOMY      No Comments Provided    VASECTOMY      reversal scheduled for 12/06       Bleeding tendencies:  No    ALLERGIES/SENSITIVITIES: No Known Allergies     CURRENT MEDICATIONS:    Prior to Admission medications    Medication Sig Start Date End Date Taking? Authorizing Provider   ARIPiprazole (ABILIFY) 5 MG tablet TAKE 1 1/2 TABLETS BY MOUTH ONCE DAILY 12/10/24  Yes Vinicius Padilla MD   desipramine (NORPRAMIN) 25 MG tablet Take 2 tablets (50 mg) by mouth daily. 11/18/24  Yes Vinicius Padilla MD   sildenafil (VIAGRA) 100 MG tablet Take 1 tablet (100 mg) by mouth as needed (ED). TAKE ONE TABLET BY MOUTH AS NEEDED 10/24/24   Prashanth Metz MD       Physical Exam:  /88   Pulse 82   Temp (!) 96.5  F (35.8  C) (Tympanic)   Resp 16   Wt 90.4 kg (199 lb 6.4 oz)   SpO2 98%   BMI 29.45 kg/m    EXAM:  Chest/Respiratory Exam: Normal - Clear to auscultation without rales, rhonchi, or wheezing.  Cardiovascular Exam: regular rate and rhythm        PLAN: COLONOSCOPY .  Patient understands risks of bleeding, perforation, potential inability to reach cecum, aspiration and wishes to proceed.

## 2025-01-22 PROBLEM — K63.5 COLON POLYP: Status: RESOLVED | Noted: 2025-01-20 | Resolved: 2025-01-22

## 2025-01-22 PROBLEM — Z00.00 HEALTHCARE MAINTENANCE: Status: RESOLVED | Noted: 2025-01-18 | Resolved: 2025-01-22

## 2025-01-22 LAB
PATH REPORT.COMMENTS IMP SPEC: NORMAL
PATH REPORT.FINAL DX SPEC: NORMAL
PATH REPORT.RELEVANT HX SPEC: NORMAL
PHOTO IMAGE: NORMAL

## 2025-02-18 ENCOUNTER — OFFICE VISIT (OUTPATIENT)
Dept: PSYCHIATRY | Facility: OTHER | Age: 50
End: 2025-02-18
Attending: PSYCHIATRY & NEUROLOGY
Payer: COMMERCIAL

## 2025-02-18 VITALS
OXYGEN SATURATION: 99 % | BODY MASS INDEX: 31.03 KG/M2 | TEMPERATURE: 97 F | DIASTOLIC BLOOD PRESSURE: 77 MMHG | SYSTOLIC BLOOD PRESSURE: 138 MMHG | WEIGHT: 209.5 LBS | RESPIRATION RATE: 12 BRPM | HEIGHT: 69 IN | HEART RATE: 109 BPM

## 2025-02-18 DIAGNOSIS — Z13.220 SCREENING FOR HYPERLIPIDEMIA: ICD-10-CM

## 2025-02-18 DIAGNOSIS — T88.7XXA MEDICATION SIDE EFFECTS: ICD-10-CM

## 2025-02-18 DIAGNOSIS — E22.1 SECONDARY HYPERPROLACTINEMIA: ICD-10-CM

## 2025-02-18 DIAGNOSIS — F32.A DEPRESSION, UNSPECIFIED DEPRESSION TYPE: Primary | ICD-10-CM

## 2025-02-18 DIAGNOSIS — R73.9 HYPERGLYCEMIA: ICD-10-CM

## 2025-02-18 RX ORDER — DESIPRAMINE HYDROCHLORIDE 50 MG/1
50 TABLET ORAL DAILY
Qty: 90 TABLET | Refills: 1 | Status: SHIPPED | OUTPATIENT
Start: 2025-02-18

## 2025-02-18 RX ORDER — ARIPIPRAZOLE 5 MG/1
5 TABLET ORAL DAILY
Qty: 90 TABLET | Refills: 1 | Status: SHIPPED | OUTPATIENT
Start: 2025-02-18

## 2025-02-18 ASSESSMENT — PAIN SCALES - GENERAL: PAINLEVEL_OUTOF10: NO PAIN (0)

## 2025-02-18 ASSESSMENT — ANXIETY QUESTIONNAIRES
2. NOT BEING ABLE TO STOP OR CONTROL WORRYING: NOT AT ALL
GAD7 TOTAL SCORE: 0
5. BEING SO RESTLESS THAT IT IS HARD TO SIT STILL: NOT AT ALL
GAD7 TOTAL SCORE: 0
7. FEELING AFRAID AS IF SOMETHING AWFUL MIGHT HAPPEN: NOT AT ALL
1. FEELING NERVOUS, ANXIOUS, OR ON EDGE: NOT AT ALL
4. TROUBLE RELAXING: NOT AT ALL
3. WORRYING TOO MUCH ABOUT DIFFERENT THINGS: NOT AT ALL
6. BECOMING EASILY ANNOYED OR IRRITABLE: NOT AT ALL
GAD7 TOTAL SCORE: 0
7. FEELING AFRAID AS IF SOMETHING AWFUL MIGHT HAPPEN: NOT AT ALL
8. IF YOU CHECKED OFF ANY PROBLEMS, HOW DIFFICULT HAVE THESE MADE IT FOR YOU TO DO YOUR WORK, TAKE CARE OF THINGS AT HOME, OR GET ALONG WITH OTHER PEOPLE?: NOT DIFFICULT AT ALL
IF YOU CHECKED OFF ANY PROBLEMS ON THIS QUESTIONNAIRE, HOW DIFFICULT HAVE THESE PROBLEMS MADE IT FOR YOU TO DO YOUR WORK, TAKE CARE OF THINGS AT HOME, OR GET ALONG WITH OTHER PEOPLE: NOT DIFFICULT AT ALL

## 2025-02-18 NOTE — PROGRESS NOTES
"Psychiatric Progress Note/Visit  February 18, 2025    Identifying Data:  This is a 49-year-old man seen for psychiatric medication management treatment of depression, anxiety and seasonal affective disorder.    Interval History:  He was seen most recently on November 18, 2024.  At that time he reported doing reasonably well, but said he felt he was \"lacking motivation\".  He was able to get done what needed to be done, but found it difficult to be motivated to do other things.  He said that that was a longstanding problem.  We discussed possible treatment options and reviewed diagnosis of ADHD.  At this time he does not want to consider other treatment.  We also reviewed his seasonal affective disorder and he was recommended to get his full-spectrum light and start using it.    Today he says that he is doing \"good\".  He has his full-spectrum light for seasonal affective disorder, but feels that he does not need to use it at this time.  I let him know that he could start using it anytime and most people notice benefits after a few days.  I reviewed with him EKGs going back to 2012, 2022 and 2023.  His QTc most recently was 433, previously 419 and 408.  These numbers are all within acceptable range and I instructed him that we would only need to repeat an EKG if his dosage of desipramine was increased.  He requested decreasing the frequency of our visits to every 6 months and I agreed.    Mental Status Exam:  Mood remains euthymic with anxiety and depression both considered absent.  MSE is essentially unchanged from November 18, 2024.    Current Psychiatric Medications:  Desipramine 50 mg a day  Abilify 5 mg a day      Lab Tests/Results:  Fasting blood work was done on October 24, 2024 and previously reviewed.  I have ordered the following fasting blood work to be done prior to our next visit in 6 months: Lipid panel, CMP, CBC with differential and platelets, hemoglobin A1c, serum prolactin level.    Weight today was " 209 pounds and 5 ounces, weight on November 18, 2024 was 206 pounds and 7 ounces, weight on August 19, 2024 was 200 pounds, weight on May 20, 2024 was 205 pounds, weight on October 19, 2023 was 204 pounds and 5 ounces, weight on September 19, 2023 was 198 pounds and 9 ounces, weight on Michelle 15, 2021 (shortly after restarting Abilify) was 195 pounds and 3 ounces    Diagnosis:  Major depression, single episode, mild  Anxiety, unspecified  Seasonal affective disorder  ADHD  History of vitamin D deficiency    Impression/Assessment:  Jemal continues to do very well on his 2 psychiatric medications.  He has not needed to use his full-spectrum light this year, but I instructed him that he can use it anytime through April or May.  At this time he does not want to consider going on medication for ADHD.  He is not reporting any significant impairment.  He has requested decreasing the frequency of our visits to every 6 months and I agreed with that.    Plan:  Continue desipramine 50 mg a day for depression  Continue Abilify 5 mg a day for depression and anxiety  Consider using full-spectrum light therapy at least 30 minutes a day through the end of March or April for seasonal affective disorder  Consider low-dose Adderall or other medication for ADHD and possibly to help with motivation.  Follow-up with Dr. Padilla in 6 months, or sooner if needed    Time spent on day of visit 47 minutes -16 minutes (5:50 AM through 6:06 AM) review of EMR prior to visit, 19 minutes (9:09 AM through 9:28 AM) face-to-face meeting with patient, including discussion of risk/benefits, alternatives and possible side effects to medication, counseling on above issues, and prescription of medications in the EMR, 12 minutes (9:28 AM through 9:40 AM) documentation in the EMR      Vinicius Padilla MD    Answers submitted by the patient for this visit:  Patient Health Questionnaire (Submitted on 2/18/2025)  If you checked off any problems, how difficult  have these problems made it for you to do your work, take care of things at home, or get along with other people?: Not difficult at all  PHQ9 TOTAL SCORE: 0  Patient Health Questionnaire (G7) (Submitted on 2/18/2025)  LEANA 7 TOTAL SCORE: 0

## 2025-02-18 NOTE — NURSING NOTE
"Chief Complaint   Patient presents with    Medication Therapy Management       Initial /77 (BP Location: Right arm, Patient Position: Sitting, Cuff Size: Adult Large)   Pulse 109   Temp 97  F (36.1  C) (Tympanic)   Resp 12   Ht 1.753 m (5' 9\")   Wt 95 kg (209 lb 8 oz)   SpO2 99%   BMI 30.94 kg/m   Estimated body mass index is 30.94 kg/m  as calculated from the following:    Height as of this encounter: 1.753 m (5' 9\").    Weight as of this encounter: 95 kg (209 lb 8 oz).  Medication Review: complete    The next two questions are to help us understand your food security.  If you are feeling you need any assistance in this area, we have resources available to support you today.          2/18/2025   SDOH- Food Insecurity   Within the past 12 months, did you worry that your food would run out before you got money to buy more? N   Within the past 12 months, did the food you bought just not last and you didn t have money to get more? N             Dustin Hinds      "

## 2025-08-18 ENCOUNTER — OFFICE VISIT (OUTPATIENT)
Dept: PSYCHIATRY | Facility: OTHER | Age: 50
End: 2025-08-18
Attending: PSYCHIATRY & NEUROLOGY
Payer: COMMERCIAL

## 2025-08-18 VITALS
DIASTOLIC BLOOD PRESSURE: 90 MMHG | SYSTOLIC BLOOD PRESSURE: 142 MMHG | WEIGHT: 210.7 LBS | OXYGEN SATURATION: 98 % | HEIGHT: 69 IN | TEMPERATURE: 97 F | RESPIRATION RATE: 12 BRPM | HEART RATE: 101 BPM | BODY MASS INDEX: 31.21 KG/M2

## 2025-08-18 DIAGNOSIS — F32.A DEPRESSION, UNSPECIFIED DEPRESSION TYPE: ICD-10-CM

## 2025-08-18 PROCEDURE — 1126F AMNT PAIN NOTED NONE PRSNT: CPT | Performed by: PSYCHIATRY & NEUROLOGY

## 2025-08-18 PROCEDURE — 3077F SYST BP >= 140 MM HG: CPT | Performed by: PSYCHIATRY & NEUROLOGY

## 2025-08-18 PROCEDURE — 3080F DIAST BP >= 90 MM HG: CPT | Performed by: PSYCHIATRY & NEUROLOGY

## 2025-08-18 PROCEDURE — 99215 OFFICE O/P EST HI 40 MIN: CPT | Performed by: PSYCHIATRY & NEUROLOGY

## 2025-08-18 RX ORDER — ARIPIPRAZOLE 5 MG/1
5 TABLET ORAL DAILY
Qty: 90 TABLET | Refills: 1 | Status: SHIPPED | OUTPATIENT
Start: 2025-08-18

## 2025-08-18 RX ORDER — DESIPRAMINE HYDROCHLORIDE 50 MG/1
50 TABLET ORAL DAILY
Qty: 90 TABLET | Refills: 1 | Status: SHIPPED | OUTPATIENT
Start: 2025-08-18

## 2025-08-18 ASSESSMENT — ANXIETY QUESTIONNAIRES
6. BECOMING EASILY ANNOYED OR IRRITABLE: NOT AT ALL
GAD7 TOTAL SCORE: 0
3. WORRYING TOO MUCH ABOUT DIFFERENT THINGS: NOT AT ALL
7. FEELING AFRAID AS IF SOMETHING AWFUL MIGHT HAPPEN: NOT AT ALL
IF YOU CHECKED OFF ANY PROBLEMS ON THIS QUESTIONNAIRE, HOW DIFFICULT HAVE THESE PROBLEMS MADE IT FOR YOU TO DO YOUR WORK, TAKE CARE OF THINGS AT HOME, OR GET ALONG WITH OTHER PEOPLE: NOT DIFFICULT AT ALL
1. FEELING NERVOUS, ANXIOUS, OR ON EDGE: NOT AT ALL
7. FEELING AFRAID AS IF SOMETHING AWFUL MIGHT HAPPEN: NOT AT ALL
4. TROUBLE RELAXING: NOT AT ALL
GAD7 TOTAL SCORE: 0
5. BEING SO RESTLESS THAT IT IS HARD TO SIT STILL: NOT AT ALL
GAD7 TOTAL SCORE: 0
2. NOT BEING ABLE TO STOP OR CONTROL WORRYING: NOT AT ALL
8. IF YOU CHECKED OFF ANY PROBLEMS, HOW DIFFICULT HAVE THESE MADE IT FOR YOU TO DO YOUR WORK, TAKE CARE OF THINGS AT HOME, OR GET ALONG WITH OTHER PEOPLE?: NOT DIFFICULT AT ALL

## 2025-08-18 ASSESSMENT — PAIN SCALES - GENERAL: PAINLEVEL_OUTOF10: NO PAIN (0)

## (undated) DEVICE — TUBING SUCTION 10'X3/16" N510

## (undated) DEVICE — SOL WATER 1500ML

## (undated) DEVICE — FORCEP BIOPSY RADIAL JAW 4 BRONCH M00515180

## (undated) DEVICE — SUCTION MANIFOLD NEPTUNE 2 SYS 4 PORT 0702-020-000

## (undated) DEVICE — ENDO KIT COMPLIANCE DYKENDOCMPLY

## (undated) DEVICE — ENDO BRUSH CHANNEL MASTER CLEANING 2-4.2MM BW-412T

## (undated) RX ORDER — PROPOFOL 10 MG/ML
INJECTION, EMULSION INTRAVENOUS
Status: DISPENSED
Start: 2025-01-20